# Patient Record
Sex: FEMALE | Race: WHITE | NOT HISPANIC OR LATINO | Employment: OTHER | URBAN - METROPOLITAN AREA
[De-identification: names, ages, dates, MRNs, and addresses within clinical notes are randomized per-mention and may not be internally consistent; named-entity substitution may affect disease eponyms.]

---

## 2017-01-22 DIAGNOSIS — I48.19 ATRIAL FIBRILLATION, PERSISTENT: ICD-10-CM

## 2017-01-23 RX ORDER — METOPROLOL SUCCINATE 25 MG/1
TABLET, EXTENDED RELEASE ORAL
Qty: 30 TABLET | Refills: 0 | Status: SHIPPED | OUTPATIENT
Start: 2017-01-23 | End: 2017-02-22 | Stop reason: SDUPTHER

## 2017-02-22 DIAGNOSIS — I48.19 ATRIAL FIBRILLATION, PERSISTENT: ICD-10-CM

## 2017-02-22 RX ORDER — METOPROLOL SUCCINATE 25 MG/1
TABLET, EXTENDED RELEASE ORAL
Qty: 30 TABLET | Refills: 0 | Status: SHIPPED | OUTPATIENT
Start: 2017-02-22 | End: 2017-03-24 | Stop reason: SDUPTHER

## 2017-03-16 ENCOUNTER — OFFICE VISIT (OUTPATIENT)
Dept: INTERNAL MEDICINE | Facility: CLINIC | Age: 82
End: 2017-03-16
Payer: MEDICARE

## 2017-03-16 ENCOUNTER — OFFICE VISIT (OUTPATIENT)
Dept: OPHTHALMOLOGY | Facility: CLINIC | Age: 82
End: 2017-03-16
Payer: MEDICARE

## 2017-03-16 VITALS
SYSTOLIC BLOOD PRESSURE: 118 MMHG | DIASTOLIC BLOOD PRESSURE: 74 MMHG | HEART RATE: 79 BPM | OXYGEN SATURATION: 97 % | BODY MASS INDEX: 19.84 KG/M2 | WEIGHT: 130.94 LBS | HEIGHT: 68 IN

## 2017-03-16 DIAGNOSIS — Z00.00 ENCOUNTER FOR PREVENTIVE HEALTH EXAMINATION: Primary | ICD-10-CM

## 2017-03-16 DIAGNOSIS — Z96.1 PSEUDOPHAKIA OF BOTH EYES: ICD-10-CM

## 2017-03-16 DIAGNOSIS — I27.9 PULMONARY HEART DISEASE: ICD-10-CM

## 2017-03-16 DIAGNOSIS — H35.3190 NONEXUDATIVE SENILE MACULAR DEGENERATION OF RETINA: ICD-10-CM

## 2017-03-16 DIAGNOSIS — M85.80 OSTEOPENIA: ICD-10-CM

## 2017-03-16 DIAGNOSIS — I48.19 ATRIAL FIBRILLATION, PERSISTENT: ICD-10-CM

## 2017-03-16 DIAGNOSIS — I70.0 ATHEROSCLEROSIS OF AORTA: ICD-10-CM

## 2017-03-16 DIAGNOSIS — H40.1192 MODERATE STAGE CHRONIC OPEN ANGLE GLAUCOMA: ICD-10-CM

## 2017-03-16 DIAGNOSIS — H40.1132 PRIMARY OPEN ANGLE GLAUCOMA OF BOTH EYES, MODERATE STAGE: Primary | ICD-10-CM

## 2017-03-16 PROCEDURE — 99999 PR PBB SHADOW E&M-EST. PATIENT-LVL IV: CPT | Mod: PBBFAC,,, | Performed by: PHYSICIAN ASSISTANT

## 2017-03-16 PROCEDURE — 99499 UNLISTED E&M SERVICE: CPT | Mod: S$GLB,,, | Performed by: PHYSICIAN ASSISTANT

## 2017-03-16 PROCEDURE — 99499 UNLISTED E&M SERVICE: CPT | Mod: S$GLB,,, | Performed by: OPHTHALMOLOGY

## 2017-03-16 PROCEDURE — 92012 INTRM OPH EXAM EST PATIENT: CPT | Mod: S$GLB,,, | Performed by: OPHTHALMOLOGY

## 2017-03-16 PROCEDURE — 92133 CPTRZD OPH DX IMG PST SGM ON: CPT | Mod: S$GLB,,, | Performed by: OPHTHALMOLOGY

## 2017-03-16 PROCEDURE — 99999 PR PBB SHADOW E&M-EST. PATIENT-LVL II: CPT | Mod: PBBFAC,,, | Performed by: OPHTHALMOLOGY

## 2017-03-16 PROCEDURE — G0439 PPPS, SUBSEQ VISIT: HCPCS | Mod: S$GLB,,, | Performed by: PHYSICIAN ASSISTANT

## 2017-03-16 NOTE — PROGRESS NOTES
"Destini Augustine presented for a  Medicare AWV and comprehensive Health Risk Assessment today. The following components were reviewed and updated:    · Medical history  · Family History  · Social history  · Allergies and Current Medications  · Health Risk Assessment  · Health Maintenance  · Care Team     ** See Completed Assessments for Annual Wellness Visit within the encounter summary.**       The following assessments were completed:  · Living Situation  · CAGE  · Depression Screening  · Timed Get Up and Go  · Whisper Test  · Cognitive Function Screening  · Nutrition Screening  · ADL Screening  · PAQ Screening    Vitals:    03/16/17 1405   BP: 118/74   BP Location: Left arm   Patient Position: Sitting   BP Method: Manual   Pulse: 79   SpO2: 97%   Weight: 59.4 kg (130 lb 15.3 oz)   Height: 5' 7.5" (1.715 m)     Body mass index is 20.21 kg/(m^2).  Physical Exam   Constitutional: She appears well-developed and well-nourished. No distress.   HENT:   Head: Normocephalic and atraumatic.   Eyes: Conjunctivae and EOM are normal. Right eye exhibits no discharge. Left eye exhibits no discharge.   Neck: Normal range of motion. Neck supple. No tracheal deviation present. No thyromegaly present.   Cardiovascular: Normal rate, regular rhythm and normal heart sounds.    No murmur heard.  Pulses:       Radial pulses are 2+ on the right side, and 2+ on the left side.   Pulmonary/Chest: Effort normal and breath sounds normal. No respiratory distress. She has no wheezes.   Abdominal: Soft. Bowel sounds are normal. She exhibits no distension. There is no tenderness. There is no rebound and no guarding.   Musculoskeletal: Normal range of motion. She exhibits no edema or tenderness.   Neurological: No cranial nerve deficit.   Grasp equal both hands, No tremors, or muscle fasciculations noted. Toes downgoing, Sensation intact to soft touch. Gait: No ataxia.    Skin: Skin is warm and dry. No rash noted. She is not diaphoretic. No erythema. " No pallor.   Psychiatric: She has a normal mood and affect. Her behavior is normal. Judgment and thought content normal.   Nursing note and vitals reviewed.        Diagnoses and health risks identified today and associated recommendations/orders:    1. Encounter for preventive health examination  Completed today    2. Atrial fibrillation, persistent  Stable. On Apixaban. Continue current treatment plan as previously prescribed with your cardiologist, Dr. Gibson. appt  5/1/17    3. Osteopenia  Dexa 7/17/14. Discuss update Dexa with PCP  Stable. Continue current treatment plan as previously prescribed with your PCP    4. Atherosclerosis of aorta  Lumbar x-ray 9/21/06- atherosclerotic arterial calcification noted.   Discussed this is not emergent. Discussed need to continue control BP, lipids, glucose, avoid smoking to avoid further arterial wall buildup.    5. Pulmonary heart disease  2D echo 8/22/16- The estimated PA systolic pressure is 46 mmHg.  Stable. Continue current treatment plan as previously prescribed with cardiology.    6. Moderate stage chronic open angle glaucoma  Stable. Continue current treatment plan as previously prescribed with your ophthalmologist, Dr. Benz. Appt today    7. Nonexudative senile macular degeneration of retina  Stable. Continue current treatment plan as previously prescribed with your ophthalmologist, Dr. Benz      Provided Destini with a 5-10 year written screening schedule and personal prevention plan. Recommendations were developed using the USPSTF age appropriate recommendations. Education, counseling, and referrals were provided as needed. After Visit Summary printed and given to patient which includes a list of additional screenings\tests needed. Continue to follow with your PCP as scheduled or sooner if necessary.        Víctor Bryant PA-C

## 2017-03-16 NOTE — PROGRESS NOTES
SUBJECTIVE:   Destini Augustine is a 83 y.o. female   Uncorrected distance visual acuity was 20/30 +2 in the right eye and 20/30 +2 in the left eye.   Chief Complaint   Patient presents with    Glaucoma        HPI:  HPI     Patient is here for 4 month iop check, and goct, patient is 100%   COMPLIANT WITH DROP USAGE.        1. Mod COAG OD>OS (Initial 30/26) goal = 17  2. PCIOL OD w/ ECP & Toric IOL 5/20/09  PCIOL OS w/ ECP & Toric IOL 4/22/09  3. AMD  4. PCO OS    Latanoprost QHS OU  Ocuvite  O3FO       Last edited by CURRY Fay on 3/16/2017  2:57 PM.     Assessment /Plan :  1. Primary open angle glaucoma of both eyes, moderate stage Doing well, IOP within acceptable range relative to target IOP and no evidence of progression. Continue current treatment. Reviewed importance of continued compliance with treatment and follow up.     2. Pseudophakia of both eyes doing well       Return to clinic in 4 months  or as needed.  With IOP Check.

## 2017-03-16 NOTE — MR AVS SNAPSHOT
[facility] - Internal Medicine  [address] [place] [address]  Phone: [phone]  Fax: [phone]                  Destini Augustine   3/16/2017 1:00 PM   Office Visit    Description:  Female : 3/29/1933   Provider:  Víctor Bryant PA-C   Department:  [facility] - Internal Medicine           Reason for Visit     Health Risk Assessment           Diagnoses this Visit        Comments    Encounter for preventive health examination    -  Primary     Atrial fibrillation, persistent         Osteopenia         Atherosclerosis of aorta         Pulmonary heart disease         Moderate stage chronic open angle glaucoma         Nonexudative senile macular degeneration of retina                To Do List           Future Appointments        Provider Department Dept Phone    3/16/2017 2:45 PM Quinton Benz MD [facility] Ophthalmology [phone]    2017 9:00 AM Chacho Gibson MD [facility] Cardiology [phone]      Goals (5 Years of Data)     None      [facility] On Call     [facility] On Call Nurse Care Line -  Assistance  Registered nurses in the [facility] On Call Center provide clinical advisement, health education, appointment booking, and other advisory services.  Call for this free service at [phone].             Medications           Message regarding Medications     Verify the changes and/or additions to your medication regime listed below are the same as discussed with your clinician today.  If any of these changes or additions are incorrect, please notify your healthcare provider.        STOP taking these medications     omeprazole (PRILOSEC) 20 MG capsule Take 1 capsule (20 mg total) by mouth daily as needed.           Verify that the below list of medications is an accurate representation of the medications you are currently taking.  If none reported, the list may be blank. If incorrect, please contact your healthcare provider. Carry this list with you in case of emergency.           Current  "Medications     apixaban 2.5 mg Tab Take 1 tablet (2.5 mg total) by mouth 2 (two) times daily.    BIOTIN ORAL Take 1 tablet by mouth 2 (two) times daily.     CALCIUM CARBONATE/VITAMIN D3 (CALCIUM 600 + D,3, ORAL) Take 1 capsule by mouth 2 (two) times daily.    CYANOCOBALAMIN, VITAMIN B-12, (VITAMIN B-12 ORAL) Take 1 capsule by mouth once daily.     DOCOSAHEXANOIC ACID/EPA (FISH OIL ORAL) Take 1 capsule by mouth 2 (two) times daily.     GLUCOSAMINE HCL/CHONDRO COBB A (GLUCOSAMINE-CHONDROITIN ORAL) Take 1 tablet by mouth 2 (two) times daily.     latanoprost 0.005 % ophthalmic solution Place 1 drop into both eyes nightly.    metoprolol succinate (TOPROL-XL) 25 MG 24 hr tablet TAKE ONE TABLET BY MOUTH ONCE DAILY    PV W-O ELISEO/FERROUS FUMARATE/FA (M-VIT ORAL) Take 1 tablet by mouth Daily.    turmeric root extract 500 mg Cap Take by mouth.    vitamin A-vitamin C-vit E-min (OCUVITE) Tab Take 1 tablet by mouth Daily.           Clinical Reference Information           Your Vitals Were     BP Pulse Height Weight SpO2 BMI    118/74 (BP Location: Left arm, Patient Position: Sitting, BP Method: Manual) 79 5' 7.5" (1.715 m) 59.4 kg (130 lb 15.3 oz) 97% 20.21 kg/m2      Blood Pressure          Most Recent Value    BP  118/74      Allergies as of 3/16/2017     Shellfish Containing Products    Venom-wasp      Immunizations Administered on Date of Encounter - 3/16/2017     None      Instructions      Counseling and Referral of Other Preventative  (Italic type indicates deductible and co-insurance are waived)    Patient Name: Destini Augustine  Today's Date: 3/16/2017      SERVICE LIMITATIONS RECOMMENDATION    Vaccines    · Pneumococcal (once after 65)    · Influenza (annually)    · Hepatitis B (if medium/high risk)    · Prevnar 13      Hepatitis B medium/high risk factors:       - End-stage renal disease       - Hemophiliacs who received Factor VII or         IX concentrates       - Clients of institutions for the mentally             " retarded       - Persons who live in the same house as          a HepB carrier       - Homosexual men       - Illicit injectable drug abusers     Pneumococcal: done 1999     Influenza:10/2016 Done, repeat in one year     Hepatitis B: N/A     Prevnar 13: done 8/2015    Mammogram (biennial age 50-74)  Annually (age 40 or over)  Last done 11/25/2016, recommend to repeat every 1  years    Pap (up to age 70 and after 70 if unknown history or abnormal study last 10 years)    N/A     The USPSTF recommends against screening for cervical cancer in women older than age 65 years who have had adequate prior screening and are not otherwise at high risk for cervical cancer.      Colorectal cancer screening (to age 75)    · Fecal occult blood test (annual)  · Flexible sigmoidoscopy (5y)  · Screening colonoscopy (10y)  · Barium enema   N/A    Diabetes self-management training (no USPSTF recommendations)  Requires referral by treating physician for patient with diabetes or renal disease. 10 hours of initial DSMT sessions of no less than 30 minutes each in a continuous 12-month period. 2 hours of follow-up DSMT in subsequent years.  follow PCP    Bone mass measurements (age 65 & older, biennial)  Requires diagnosis related to osteoporosis or estrogen deficiency. Biennial benefit unless patient has history of long-term glucocorticoid  Last done 7/17/14, recommend to repeat every 2  years    Glaucoma screening (no USPSTF recommendation)  Diabetes mellitus, family history   , age 50 or over    American, age 65 or over  Continue follow Dr. Benz    Medical nutrition therapy for diabetes or renal disease (no recommended schedule)  Requires referral by treating physician for patient with diabetes or renal disease or kidney transplant within the past 3 years.  Can be provided in same year as diabetes self-management training (DSMT), and CMS recommends medical nutrition therapy take place after DSMT. Up to 3 hours  for initial year and 2 hours in subsequent years.  Continue follow PCP    Cardiovascular screening blood tests (every 5 years)  · Fasting lipid panel  Order as a panel if possible  Continue follow PCP    Diabetes screening tests (at least every 3 years, Medicare covers annually or at 6-month intervals for prediabetic patients)  · Fasting blood sugar (FBS) or glucose tolerance test (GTT)  Patient must be diagnosed with one of the following:       - Hypertension       - Dyslipidemia       - Obesity (BMI 30kg/m2)       - Previous elevated impaired FBS or GTT       ... or any two of the following:       - Overweight (BMI 25 but <30)       - Family history of diabetes       - Age 65 or older       - History of gestational diabetes or birth of baby weighing more than 9 pounds  Continue follow PCP    Abdominal aortic aneurysm screening (once)  · Sonogram   Limited to patients who meet one of the following criteria:       - Men who are 65-75 years old and have smoked more than 100 cigarette in their lifetime       - Anyone with a family history of abdominal aortic aneurysm       - Anyone recommended for screening by the USPSTF  Continue follow PCP    HIV screening (annually for increased risk patients)  · HIV-1 and HIV-2 by EIA, or MISTY, rapid antibody test or oral mucosa transudate  Patients must be at increased risk for HIV infection per USPSTF guidelines or pregnant. Tests covered annually for patient at increased risk or as requested by the patient. Pregnant patients may receive up to 3 tests during pregnancy.  Risks discussed, screening is not recommended    Smoking cessation counseling (up to 8 sessions per year)  Patients must be asymptomatic of tobacco-related conditions to receive as a preventative service.  does not smoke    Subsequent annual wellness visit  At least 12 months since last AWV  Return in one year     The following information is provided to all patients.  This information is to help you find  resources for any of the problems found today that may be affecting your health:                Living healthy guide: www.[facility].[place].[facility]      Understanding Diabetes: www.diabetes.org      Eating healthy: www.cdc.gov/healthyweight      CDC home safety checklist: www.cdc.gov/steadi/patient.html      Agency on Aging: www.goea.[place].[facility]      Alcoholics anonymous (AA): www.aa.org      Physical Activity: www.deidra.nih.gov/pu6ymqj      Tobacco use: www.quitwithusla.org          Language Assistance Services     ATTENTION: Language assistance services are available, free of charge. Please call [phone].      ATENCIÓN: Si habla español, tiene a portillo disposición servicios gratuitos de asistencia lingüística. Llame al [phone].     CHÚ Ý: N?u b?n nói Ti?ng Vi?t, có các d?ch v? h? tr? ngôn ng? mi?n phí dành cho b?n. G?i s? [phone].         [facility] - Internal Medicine complies with applicable Federal civil rights laws and does not discriminate on the basis of race, color, national origin, age, disability, or sex.

## 2017-03-16 NOTE — PATIENT INSTRUCTIONS
Counseling and Referral of Other Preventative  (Italic type indicates deductible and co-insurance are waived)    Patient Name: Destini Augustine  Today's Date: 3/16/2017      SERVICE LIMITATIONS RECOMMENDATION    Vaccines    · Pneumococcal (once after 65)    · Influenza (annually)    · Hepatitis B (if medium/high risk)    · Prevnar 13      Hepatitis B medium/high risk factors:       - End-stage renal disease       - Hemophiliacs who received Factor VII or         IX concentrates       - Clients of institutions for the mentally             retarded       - Persons who live in the same house as          a HepB carrier       - Homosexual men       - Illicit injectable drug abusers     Pneumococcal: done 1999     Influenza:10/2016 Done, repeat in one year     Hepatitis B: N/A     Prevnar 13: done 8/2015    Mammogram (biennial age 50-74)  Annually (age 40 or over)  Last done 11/25/2016, recommend to repeat every 1  years    Pap (up to age 70 and after 70 if unknown history or abnormal study last 10 years)    N/A     The USPSTF recommends against screening for cervical cancer in women older than age 65 years who have had adequate prior screening and are not otherwise at high risk for cervical cancer.      Colorectal cancer screening (to age 75)    · Fecal occult blood test (annual)  · Flexible sigmoidoscopy (5y)  · Screening colonoscopy (10y)  · Barium enema   N/A    Diabetes self-management training (no USPSTF recommendations)  Requires referral by treating physician for patient with diabetes or renal disease. 10 hours of initial DSMT sessions of no less than 30 minutes each in a continuous 12-month period. 2 hours of follow-up DSMT in subsequent years.  follow PCP    Bone mass measurements (age 65 & older, biennial)  Requires diagnosis related to osteoporosis or estrogen deficiency. Biennial benefit unless patient has history of long-term glucocorticoid  Last done 7/17/14, recommend to repeat every 2  years    Glaucoma  screening (no USPSTF recommendation)  Diabetes mellitus, family history   , age 50 or over    American, age 65 or over  Continue follow Dr. Benz    Medical nutrition therapy for diabetes or renal disease (no recommended schedule)  Requires referral by treating physician for patient with diabetes or renal disease or kidney transplant within the past 3 years.  Can be provided in same year as diabetes self-management training (DSMT), and CMS recommends medical nutrition therapy take place after DSMT. Up to 3 hours for initial year and 2 hours in subsequent years.  Continue follow PCP    Cardiovascular screening blood tests (every 5 years)  · Fasting lipid panel  Order as a panel if possible  Continue follow PCP    Diabetes screening tests (at least every 3 years, Medicare covers annually or at 6-month intervals for prediabetic patients)  · Fasting blood sugar (FBS) or glucose tolerance test (GTT)  Patient must be diagnosed with one of the following:       - Hypertension       - Dyslipidemia       - Obesity (BMI 30kg/m2)       - Previous elevated impaired FBS or GTT       ... or any two of the following:       - Overweight (BMI 25 but <30)       - Family history of diabetes       - Age 65 or older       - History of gestational diabetes or birth of baby weighing more than 9 pounds  Continue follow PCP    Abdominal aortic aneurysm screening (once)  · Sonogram   Limited to patients who meet one of the following criteria:       - Men who are 65-75 years old and have smoked more than 100 cigarette in their lifetime       - Anyone with a family history of abdominal aortic aneurysm       - Anyone recommended for screening by the USPSTF  Continue follow PCP    HIV screening (annually for increased risk patients)  · HIV-1 and HIV-2 by EIA, or MISTY, rapid antibody test or oral mucosa transudate  Patients must be at increased risk for HIV infection per USPSTF guidelines or pregnant. Tests covered  annually for patient at increased risk or as requested by the patient. Pregnant patients may receive up to 3 tests during pregnancy.  Risks discussed, screening is not recommended    Smoking cessation counseling (up to 8 sessions per year)  Patients must be asymptomatic of tobacco-related conditions to receive as a preventative service.  does not smoke    Subsequent annual wellness visit  At least 12 months since last AWV  Return in one year     The following information is provided to all patients.  This information is to help you find resources for any of the problems found today that may be affecting your health:                Living healthy guide: www.[facility].[place].[facility]      Understanding Diabetes: www.diabetes.org      Eating healthy: www.cdc.gov/healthyweight      CDC home safety checklist: www.cdc.gov/steadi/patient.html      Agency on Aging: www.goea.[place].gov      Alcoholics anonymous (AA): www.aa.org      Physical Activity: www.deidra.nih.gov/dd2aplj      Tobacco use: www.quitwithusla.org

## 2017-03-16 NOTE — MR AVS SNAPSHOT
[facility] - Ophthalmology  [address] [place] [address]  Phone: [phone]  Fax: [phone]                  Destini Augustine   3/16/2017 2:45 PM   Office Visit    Description:  Female : 3/29/1933   Provider:  Quinton Benz MD   Department:  [facility] - Ophthalmology           Reason for Visit     Glaucoma           Diagnoses this Visit        Comments    Primary open angle glaucoma of both eyes, moderate stage    -  Primary     Pseudophakia of both eyes                To Do List           Future Appointments        Provider Department Dept Phone    2017 9:00 AM Chacho Gibson MD [facility] Cardiology [phone]      Goals (5 Years of Data)     None      Follow-Up and Disposition     Return in about 4 months (around 2017).      [facility] On Call     [facility] On Call Nurse Care Line -  Assistance  Registered nurses in the [facility] On Call Center provide clinical advisement, health education, appointment booking, and other advisory services.  Call for this free service at [phone].             Medications           Message regarding Medications     Verify the changes and/or additions to your medication regime listed below are the same as discussed with your clinician today.  If any of these changes or additions are incorrect, please notify your healthcare provider.             Verify that the below list of medications is an accurate representation of the medications you are currently taking.  If none reported, the list may be blank. If incorrect, please contact your healthcare provider. Carry this list with you in case of emergency.           Current Medications     apixaban 2.5 mg Tab Take 1 tablet (2.5 mg total) by mouth 2 (two) times daily.    BIOTIN ORAL Take 1 tablet by mouth 2 (two) times daily.     CALCIUM CARBONATE/VITAMIN D3 (CALCIUM 600 + D,3, ORAL) Take 1 capsule by mouth 2 (two) times daily.    CYANOCOBALAMIN, VITAMIN B-12, (VITAMIN B-12 ORAL) Take 1 capsule by  mouth once daily.     DOCOSAHEXANOIC ACID/EPA (FISH OIL ORAL) Take 1 capsule by mouth 2 (two) times daily.     GLUCOSAMINE HCL/CHONDRO PORTILLO A (GLUCOSAMINE-CHONDROITIN ORAL) Take 1 tablet by mouth 2 (two) times daily.     latanoprost 0.005 % ophthalmic solution Place 1 drop into both eyes nightly.    metoprolol succinate (TOPROL-XL) 25 MG 24 hr tablet TAKE ONE TABLET BY MOUTH ONCE DAILY    PV W-O ELISEO/FERROUS FUMARATE/FA (M-VIT ORAL) Take 1 tablet by mouth Daily.    turmeric root extract 500 mg Cap Take by mouth.    vitamin A-vitamin C-vit E-min (OCUVITE) Tab Take 1 tablet by mouth Daily.           Clinical Reference Information           Allergies as of 3/16/2017     Shellfish Containing Products    Venom-wasp      Immunizations Administered on Date of Encounter - 3/16/2017     None      Orders Placed During Today's Visit      Normal Orders This Visit    Posterior Segment OCT Optic Nerve- Both eyes       Language Assistance Services     ATTENTION: Language assistance services are available, free of charge. Please call [phone].      ATENCIÓN: Si rosio donaldson, tiene a portillo disposición servicios gratuitos de asistencia lingüística. Llame al [phone].     [facility] Ý: N?u b?n nói Ti?ng Vi?t, có các d?ch v? h? tr? ngôn ng? mi?n phí yanelih cho b?n. G?i s? [phone].         [facility] - [facility] complies with applicable Federal civil rights laws and does not discriminate on the basis of race, color, national origin, age, disability, or sex.

## 2017-03-17 ENCOUNTER — TELEPHONE (OUTPATIENT)
Dept: CARDIOLOGY | Facility: CLINIC | Age: 82
End: 2017-03-17

## 2017-03-17 NOTE — TELEPHONE ENCOUNTER
----- Message from Nilam Galloway sent at 3/16/2017  4:29 PM CDT -----  Suzanne with [employer]//[phone]//ext [phone]//states is calling to ask if your office received the fax that was sent to you//please call/thanks/[facility]

## 2017-03-24 DIAGNOSIS — I48.19 ATRIAL FIBRILLATION, PERSISTENT: ICD-10-CM

## 2017-03-24 RX ORDER — METOPROLOL SUCCINATE 25 MG/1
TABLET, EXTENDED RELEASE ORAL
Qty: 30 TABLET | Refills: 0 | Status: SHIPPED | OUTPATIENT
Start: 2017-03-24 | End: 2017-04-24 | Stop reason: SDUPTHER

## 2017-03-30 DIAGNOSIS — I48.19 ATRIAL FIBRILLATION, PERSISTENT: ICD-10-CM

## 2017-03-30 RX ORDER — APIXABAN 2.5 MG/1
TABLET, FILM COATED ORAL
Qty: 60 TABLET | Refills: 0 | Status: SHIPPED | OUTPATIENT
Start: 2017-03-30 | End: 2017-05-01 | Stop reason: SDUPTHER

## 2017-04-03 ENCOUNTER — PATIENT MESSAGE (OUTPATIENT)
Dept: CARDIOLOGY | Facility: CLINIC | Age: 82
End: 2017-04-03

## 2017-04-06 ENCOUNTER — TELEPHONE (OUTPATIENT)
Dept: CARDIOLOGY | Facility: CLINIC | Age: 82
End: 2017-04-06

## 2017-04-06 NOTE — TELEPHONE ENCOUNTER
----- Message from Sumaya Conti sent at 4/6/2017  9:03 AM CDT -----  Call derrick with [facility] long term care at [phone]//We are  Waiting for the pt vital sign sheet please fax to [phone]//ks ht

## 2017-04-24 DIAGNOSIS — I48.19 ATRIAL FIBRILLATION, PERSISTENT: ICD-10-CM

## 2017-04-24 RX ORDER — METOPROLOL SUCCINATE 25 MG/1
TABLET, EXTENDED RELEASE ORAL
Qty: 30 TABLET | Refills: 0 | Status: SHIPPED | OUTPATIENT
Start: 2017-04-24 | End: 2017-05-01 | Stop reason: SDUPTHER

## 2017-05-01 ENCOUNTER — OFFICE VISIT (OUTPATIENT)
Dept: CARDIOLOGY | Facility: CLINIC | Age: 82
End: 2017-05-01
Payer: MEDICARE

## 2017-05-01 VITALS
HEART RATE: 72 BPM | WEIGHT: 130.06 LBS | DIASTOLIC BLOOD PRESSURE: 60 MMHG | HEIGHT: 67 IN | SYSTOLIC BLOOD PRESSURE: 114 MMHG | BODY MASS INDEX: 20.41 KG/M2

## 2017-05-01 DIAGNOSIS — I70.0 ATHEROSCLEROSIS OF AORTA: ICD-10-CM

## 2017-05-01 DIAGNOSIS — I48.19 ATRIAL FIBRILLATION, PERSISTENT: ICD-10-CM

## 2017-05-01 DIAGNOSIS — I48.21 ATRIAL FIBRILLATION, PERMANENT: Primary | Chronic | ICD-10-CM

## 2017-05-01 PROCEDURE — 1126F AMNT PAIN NOTED NONE PRSNT: CPT | Mod: S$GLB,,, | Performed by: NUCLEAR MEDICINE

## 2017-05-01 PROCEDURE — 99999 PR PBB SHADOW E&M-EST. PATIENT-LVL III: CPT | Mod: PBBFAC,,, | Performed by: NUCLEAR MEDICINE

## 2017-05-01 PROCEDURE — 1159F MED LIST DOCD IN RCRD: CPT | Mod: S$GLB,,, | Performed by: NUCLEAR MEDICINE

## 2017-05-01 PROCEDURE — 99214 OFFICE O/P EST MOD 30 MIN: CPT | Mod: S$GLB,,, | Performed by: NUCLEAR MEDICINE

## 2017-05-01 PROCEDURE — 99499 UNLISTED E&M SERVICE: CPT | Mod: S$GLB,,, | Performed by: NUCLEAR MEDICINE

## 2017-05-01 PROCEDURE — 1160F RVW MEDS BY RX/DR IN RCRD: CPT | Mod: S$GLB,,, | Performed by: NUCLEAR MEDICINE

## 2017-05-01 RX ORDER — METOPROLOL SUCCINATE 25 MG/1
25 TABLET, EXTENDED RELEASE ORAL DAILY
Qty: 30 TABLET | Refills: 5 | Status: SHIPPED | OUTPATIENT
Start: 2017-05-01 | End: 2017-11-18 | Stop reason: SDUPTHER

## 2017-05-01 NOTE — PROGRESS NOTES
Subjective:   Patient ID:  Destini Augustine is a 84 y.o. female who presents for follow-up of Atrial Fibrillation (6 month followup)      HPI 1- CHRONIC PERMANENT AF  2- ATHEROSCLEROSIS OF AORTA  DOING WELL. NO RECENT ED VISITS OR HOSPITALIZATIONS FOR EXACERBATION OF CARD ARRHYTHMIAS. OR ADHF. OR ACS  NO UNUSUAL FATIGUE OR TIREDNESS.   NO CHEST DISCOMFORT. NO ABDOMINAL OR BACK PAIN  NO EDEMA. NO CALVE TENDERNESS.   NO INTERMITTENT CLAUDICATION  NO FOCAL CNS SYMPTOMS OR SIGNS TO SUGGEST TIA OR STROKE  NO ABNORMAL BLEEDING- ON NOAC- ELIQUIS    Review of Systems   Constitution: Negative for chills, fever, weakness, night sweats, weight gain and weight loss.   HENT: Negative for headaches and nosebleeds.    Eyes: Negative for blurred vision, double vision and visual disturbance.   Cardiovascular: Negative for chest pain, dyspnea on exertion, irregular heartbeat, leg swelling, orthopnea, palpitations, paroxysmal nocturnal dyspnea and syncope.   Respiratory: Negative for cough, hemoptysis and wheezing.    Endocrine: Negative for polydipsia and polyuria.   Hematologic/Lymphatic: Does not bruise/bleed easily.   Skin: Negative for rash.   Musculoskeletal: Negative for joint pain, joint swelling, muscle weakness and myalgias.   Gastrointestinal: Negative for abdominal pain, hematemesis, jaundice and melena.   Genitourinary: Negative for dysuria, hematuria and nocturia.   Neurological: Negative for dizziness, focal weakness and sensory change.   Psychiatric/Behavioral: Negative for depression. The patient does not have insomnia and is not nervous/anxious.      Family History   Problem Relation Age of Onset    Cataracts Mother     Hypertension Mother     Stroke Mother     Cancer Father      colon    Thyroid disease Father     Cancer Brother      lung    Tuberculosis Brother     Blindness Neg Hx     Diabetes Neg Hx     Macular degeneration Neg Hx     Retinal detachment Neg Hx     Strabismus Neg Hx      Past Medical  History:   Diagnosis Date    Arthritis     knees, hands    Atrial fibrillation     Basal cell carcinoma     face, right thigh    COAG (chronic open-angle glaucoma) - Both Eyes 8/28/2013    Ex-smoker     Hamartoma     mandible    Macular degeneration     Osteopenia     7/14 shama 7/16    Scoliosis 11/30/15    lumbar x-ray     Current Outpatient Prescriptions on File Prior to Visit   Medication Sig Dispense Refill    BIOTIN ORAL Take 1 tablet by mouth 2 (two) times daily.       CALCIUM CARBONATE/VITAMIN D3 (CALCIUM 600 + D,3, ORAL) Take 1 capsule by mouth 2 (two) times daily.      CYANOCOBALAMIN, VITAMIN B-12, (VITAMIN B-12 ORAL) Take 1 capsule by mouth once daily.       DOCOSAHEXANOIC ACID/EPA (FISH OIL ORAL) Take 1 capsule by mouth 2 (two) times daily.       GLUCOSAMINE HCL/CHONDRO COBB A (GLUCOSAMINE-CHONDROITIN ORAL) Take 1 tablet by mouth 2 (two) times daily.       latanoprost 0.005 % ophthalmic solution Place 1 drop into both eyes nightly. 1 Bottle 12    PV W-O ELISEO/FERROUS FUMARATE/FA (M-VIT ORAL) Take 1 tablet by mouth Daily.      turmeric root extract 500 mg Cap Take by mouth.      vitamin A-vitamin C-vit E-min (OCUVITE) Tab Take 1 tablet by mouth Daily.      [DISCONTINUED] ELIQUIS 2.5 mg Tab TAKE ONE TABLET BY MOUTH TWICE DAILY 60 tablet 0    [DISCONTINUED] metoprolol succinate (TOPROL-XL) 25 MG 24 hr tablet TAKE ONE TABLET BY MOUTH ONCE DAILY 30 tablet 0     No current facility-administered medications on file prior to visit.      Review of patient's allergies indicates:   Allergen Reactions    Shellfish containing products      Other reaction(s): Nausea  Other reaction(s): Diarrhea    Venom-wasp Swelling     Other reaction(s): Itching       Objective:     Physical Exam   Constitutional: She is oriented to person, place, and time. She appears well-developed. No distress.   HENT:   Head: Normocephalic.   Eyes: Conjunctivae are normal. Pupils are equal, round, and reactive to light. No  scleral icterus.   Neck: Normal range of motion. Neck supple. Normal carotid pulses, no hepatojugular reflux and no JVD present. Carotid bruit is not present. No edema present. No thyroid mass and no thyromegaly present.   Cardiovascular: Normal rate, S1 normal, S2 normal, normal heart sounds and intact distal pulses.  An irregularly irregular rhythm present. PMI is not displaced.  Exam reveals no gallop and no friction rub.    No murmur heard.  Pulses:       Carotid pulses are 2+ on the right side, and 2+ on the left side.       Radial pulses are 2+ on the right side, and 2+ on the left side.        Femoral pulses are 2+ on the right side, and 2+ on the left side.       Popliteal pulses are 2+ on the right side, and 2+ on the left side.        Dorsalis pedis pulses are 2+ on the right side, and 2+ on the left side.        Posterior tibial pulses are 2+ on the right side, and 2+ on the left side.   Pulmonary/Chest: Effort normal and breath sounds normal. She has no wheezes. She has no rales. She exhibits no tenderness.   Abdominal: Soft. Bowel sounds are normal. She exhibits no pulsatile midline mass and no mass. There is no hepatosplenomegaly. There is no tenderness.   Musculoskeletal: Normal range of motion. She exhibits no edema or tenderness.        Cervical back: Normal.        Thoracic back: Normal.        Lumbar back: Normal.   Lymphadenopathy:     She has no cervical adenopathy.     She has no axillary adenopathy.        Right: No supraclavicular adenopathy present.        Left: No supraclavicular adenopathy present.   Neurological: She is alert and oriented to person, place, and time. She has normal strength and normal reflexes. No sensory deficit. Gait normal.   Skin: Skin is warm. No rash noted. No cyanosis. No pallor. Nails show no clubbing.   Psychiatric: She has a normal mood and affect. Her speech is normal and behavior is normal. Cognition and memory are normal.       Assessment:     1. Atrial  fibrillation, permanent    2. Atherosclerosis of aorta      STABLE CV STATUS- CONTROLLED ARRHYTHMIAS-  NO ACTIVE MYOCARDIAL ISCHEMIA. NO ADHF  CNS STATUS STABLE  NO ABNORMAL BLEEDING  CARD MED WELL TOLERATED  Plan:     Atrial fibrillation, permanent    Atherosclerosis of aorta    1- CONTINUE PRESENT CARD MANAGEMENT    2- RETURN IN 6 MONTHS.

## 2017-05-01 NOTE — MR AVS SNAPSHOT
[facility] - Cardiology  [address] [facility] [place] [address]  Phone: [phone]  Fax: [phone]                  Destini LUNA Augustine   2017 9:00 AM   Office Visit    Description:  Female : 3/29/1933   Provider:  Chacho Gibson MD   Department:  [facility]           Reason for Visit     Atrial Fibrillation           Diagnoses this Visit        Comments    Atrial fibrillation, permanent    -  Primary     Atherosclerosis of aorta                To Do List           Future Appointments        Provider Department Dept Phone    2017 1:45 PM Quinton Benz MD [facility] Ophthalmology [phone]      Goals (5 Years of Data)     None      Follow-Up and Disposition     Return in about 6 months (around 2017).      [facility]     [facility] On Call Nurse Care Line -  Assistance  Unless otherwise directed by your provider, please contact [facility], our nurse care line that is available for  assistance.     Registered nurses in the [facility] On Call Center provide: appointment scheduling, clinical advisement, health education, and other advisory services.  Call: [phone] (toll free)               Medications           Message regarding Medications     Verify the changes and/or additions to your medication regime listed below are the same as discussed with your clinician today.  If any of these changes or additions are incorrect, please notify your healthcare provider.             Verify that the below list of medications is an accurate representation of the medications you are currently taking.  If none reported, the list may be blank. If incorrect, please contact your healthcare provider. Carry this list with you in case of emergency.           Current Medications     BIOTIN ORAL Take 1 tablet by mouth 2 (two) times daily.     CALCIUM CARBONATE/VITAMIN D3 (CALCIUM 600 + D,3, ORAL) Take 1 capsule by mouth 2 (two) times daily.    CYANOCOBALAMIN, VITAMIN B-12, (VITAMIN  "B-12 ORAL) Take 1 capsule by mouth once daily.     DOCOSAHEXANOIC ACID/EPA (FISH OIL ORAL) Take 1 capsule by mouth 2 (two) times daily.     GLUCOSAMINE HCL/CHONDRO PORTILLO A (GLUCOSAMINE-CHONDROITIN ORAL) Take 1 tablet by mouth 2 (two) times daily.     latanoprost 0.005 % ophthalmic solution Place 1 drop into both eyes nightly.    PV W-O ELISEO/FERROUS FUMARATE/FA (M-VIT ORAL) Take 1 tablet by mouth Daily.    turmeric root extract 500 mg Cap Take by mouth.    vitamin A-vitamin C-vit E-min (OCUVITE) Tab Take 1 tablet by mouth Daily.    apixaban (ELIQUIS) 2.5 mg Tab Take 1 tablet (2.5 mg total) by mouth 2 (two) times daily.    metoprolol succinate (TOPROL-XL) 25 MG 24 hr tablet Take 1 tablet (25 mg total) by mouth once daily.           Clinical Reference Information           Your Vitals Were     BP Pulse Height Weight BMI    114/60 (BP Location: Right arm, Patient Position: Sitting, BP Method: Manual) 72 5' 7" (1.702 m) 59 kg (130 lb 1.1 oz) 20.37 kg/m2      Blood Pressure          Most Recent Value    BP  114/60      Allergies as of 5/1/2017     Shellfish Containing Products    Venom-wasp      Immunizations Administered on Date of Encounter - 5/1/2017     None      Language Assistance Services     ATTENTION: Language assistance services are available, free of charge. Please call [phone].      ATENCIÓN: Si habla fernieañol, tiene a portillo disposición servicios gratuitos de asistencia lingüística. Llame al [phone].     CHÚ Ý: N?u b?n nói Ti?ng Vi?t, có các d?ch v? h? tr? ngôn ng? mi?n phí dành cho b?n. G?i s? [phone].         [facility] complies with applicable Federal civil rights laws and does not discriminate on the basis of race, color, national origin, age, disability, or sex.        "

## 2017-07-20 ENCOUNTER — OFFICE VISIT (OUTPATIENT)
Dept: OPHTHALMOLOGY | Facility: CLINIC | Age: 82
End: 2017-07-20
Payer: MEDICARE

## 2017-07-20 DIAGNOSIS — Z96.1 PSEUDOPHAKIA OF BOTH EYES: ICD-10-CM

## 2017-07-20 DIAGNOSIS — H40.1132 PRIMARY OPEN ANGLE GLAUCOMA OF BOTH EYES, MODERATE STAGE: Primary | ICD-10-CM

## 2017-07-20 PROCEDURE — 99999 PR PBB SHADOW E&M-EST. PATIENT-LVL II: CPT | Mod: PBBFAC,,, | Performed by: OPHTHALMOLOGY

## 2017-07-20 PROCEDURE — 92012 INTRM OPH EXAM EST PATIENT: CPT | Mod: S$GLB,,, | Performed by: OPHTHALMOLOGY

## 2017-07-20 RX ORDER — LATANOPROST 50 UG/ML
1 SOLUTION/ DROPS OPHTHALMIC NIGHTLY
Qty: 1 BOTTLE | Refills: 12 | Status: SHIPPED | OUTPATIENT
Start: 2017-07-20 | End: 2018-01-04 | Stop reason: SDUPTHER

## 2017-07-20 NOTE — PROGRESS NOTES
SUBJECTIVE:   Destini Augustine is a 84 y.o. female   Uncorrected distance visual acuity was 20/25 in the right eye and 20/25 in the left eye.   Chief Complaint   Patient presents with    Glaucoma        HPI:  HPI     Patient returns for a 4 month iop check, patient states she is 100%   compliant with drop usage.        1. Mod COAG OD>OS (Initial 30/26) goal = 17  2. PCIOL OD w/ ECP & Toric IOL 5/20/09  PCIOL OS w/ ECP & Toric IOL 4/22/09  3. AMD  4. PCO OS    OU  Latanoprost QHS   Ocuvite  O3FO        1. Mod COAG OD>OS (Initial 30/26) goal = 17  2. PCIOL OD w/ ECP & Toric IOL 5/20/09  PCIOL OS w/ ECP & Toric IOL 4/22/09  3. AMD  4. PCO OS    OU  Latanoprost QHS   Ocuvite  O3FO    Last edited by CURRY Fay on 7/20/2017  1:47 PM. (History)        Assessment /Plan :  1. Primary open angle glaucoma of both eyes, moderate stage Doing well, IOP within acceptable range relative to target IOP and no evidence of progression. Continue current treatment. Reviewed importance of continued compliance with treatment and follow up.     2. Pseudophakia of both eyes stable       Return to clinic in 3-4 months  or as needed.  With 24-2 HVF, Dilation and SDP's

## 2017-10-05 ENCOUNTER — OFFICE VISIT (OUTPATIENT)
Dept: INTERNAL MEDICINE | Facility: CLINIC | Age: 82
End: 2017-10-05
Payer: MEDICARE

## 2017-10-05 ENCOUNTER — LAB VISIT (OUTPATIENT)
Dept: LAB | Facility: HOSPITAL | Age: 82
End: 2017-10-05
Attending: NURSE PRACTITIONER
Payer: MEDICARE

## 2017-10-05 ENCOUNTER — TELEPHONE (OUTPATIENT)
Dept: INTERNAL MEDICINE | Facility: CLINIC | Age: 82
End: 2017-10-05

## 2017-10-05 VITALS
OXYGEN SATURATION: 95 % | WEIGHT: 128.88 LBS | HEIGHT: 67 IN | TEMPERATURE: 97 F | BODY MASS INDEX: 20.23 KG/M2 | SYSTOLIC BLOOD PRESSURE: 138 MMHG | HEART RATE: 79 BPM | DIASTOLIC BLOOD PRESSURE: 88 MMHG

## 2017-10-05 DIAGNOSIS — Z78.0 POST-MENOPAUSAL: ICD-10-CM

## 2017-10-05 DIAGNOSIS — Z12.39 BREAST CANCER SCREENING: ICD-10-CM

## 2017-10-05 DIAGNOSIS — I70.0 ATHEROSCLEROSIS OF AORTA: ICD-10-CM

## 2017-10-05 DIAGNOSIS — Z00.00 PHYSICAL EXAM, ROUTINE: Primary | ICD-10-CM

## 2017-10-05 DIAGNOSIS — Z00.00 PHYSICAL EXAM, ROUTINE: ICD-10-CM

## 2017-10-05 DIAGNOSIS — M85.80 OSTEOPENIA WITH HIGH RISK OF FRACTURE: ICD-10-CM

## 2017-10-05 DIAGNOSIS — I48.21 ATRIAL FIBRILLATION, PERMANENT: Chronic | ICD-10-CM

## 2017-10-05 DIAGNOSIS — R23.8 SKIN IRRITATION: ICD-10-CM

## 2017-10-05 DIAGNOSIS — I27.9 PULMONARY HEART DISEASE: ICD-10-CM

## 2017-10-05 DIAGNOSIS — R92.8 ABNORMAL MAMMOGRAM: ICD-10-CM

## 2017-10-05 DIAGNOSIS — M85.80 OSTEOPENIA, UNSPECIFIED LOCATION: ICD-10-CM

## 2017-10-05 LAB
ALBUMIN SERPL BCP-MCNC: 3.7 G/DL
ALP SERPL-CCNC: 89 U/L
ALT SERPL W/O P-5'-P-CCNC: 59 U/L
ANION GAP SERPL CALC-SCNC: 6 MMOL/L
AST SERPL-CCNC: 39 U/L
BASOPHILS # BLD AUTO: 0.02 K/UL
BASOPHILS NFR BLD: 0.5 %
BILIRUB SERPL-MCNC: 0.8 MG/DL
BUN SERPL-MCNC: 16 MG/DL
CALCIUM SERPL-MCNC: 10.7 MG/DL
CHLORIDE SERPL-SCNC: 100 MMOL/L
CHOLEST SERPL-MCNC: 149 MG/DL
CHOLEST/HDLC SERPL: 2.2 {RATIO}
CO2 SERPL-SCNC: 32 MMOL/L
CREAT SERPL-MCNC: 0.9 MG/DL
DIFFERENTIAL METHOD: ABNORMAL
EOSINOPHIL # BLD AUTO: 0.2 K/UL
EOSINOPHIL NFR BLD: 4.4 %
ERYTHROCYTE [DISTWIDTH] IN BLOOD BY AUTOMATED COUNT: 14.4 %
EST. GFR  (AFRICAN AMERICAN): >60 ML/MIN/1.73 M^2
EST. GFR  (NON AFRICAN AMERICAN): 58.9 ML/MIN/1.73 M^2
GLUCOSE SERPL-MCNC: 93 MG/DL
HCT VFR BLD AUTO: 44.7 %
HDLC SERPL-MCNC: 68 MG/DL
HDLC SERPL: 45.6 %
HGB BLD-MCNC: 14.7 G/DL
LDLC SERPL CALC-MCNC: 68.4 MG/DL
LYMPHOCYTES # BLD AUTO: 1.3 K/UL
LYMPHOCYTES NFR BLD: 31.8 %
MCH RBC QN AUTO: 29.5 PG
MCHC RBC AUTO-ENTMCNC: 32.9 G/DL
MCV RBC AUTO: 90 FL
MONOCYTES # BLD AUTO: 0.2 K/UL
MONOCYTES NFR BLD: 5.7 %
NEUTROPHILS # BLD AUTO: 2.3 K/UL
NEUTROPHILS NFR BLD: 57.4 %
NONHDLC SERPL-MCNC: 81 MG/DL
PLATELET # BLD AUTO: 159 K/UL
PMV BLD AUTO: 12 FL
POTASSIUM SERPL-SCNC: 4.5 MMOL/L
PROT SERPL-MCNC: 7.2 G/DL
RBC # BLD AUTO: 4.99 M/UL
SODIUM SERPL-SCNC: 138 MMOL/L
TRIGL SERPL-MCNC: 63 MG/DL
TSH SERPL DL<=0.005 MIU/L-ACNC: 1.05 UIU/ML
WBC # BLD AUTO: 4.06 K/UL

## 2017-10-05 PROCEDURE — 99397 PER PM REEVAL EST PAT 65+ YR: CPT | Mod: S$GLB,,, | Performed by: NURSE PRACTITIONER

## 2017-10-05 PROCEDURE — 84443 ASSAY THYROID STIM HORMONE: CPT

## 2017-10-05 PROCEDURE — 85025 COMPLETE CBC W/AUTO DIFF WBC: CPT

## 2017-10-05 PROCEDURE — 80053 COMPREHEN METABOLIC PANEL: CPT

## 2017-10-05 PROCEDURE — 80061 LIPID PANEL: CPT

## 2017-10-05 PROCEDURE — 99999 PR PBB SHADOW E&M-EST. PATIENT-LVL V: CPT | Mod: PBBFAC,,, | Performed by: NURSE PRACTITIONER

## 2017-10-05 PROCEDURE — 36415 COLL VENOUS BLD VENIPUNCTURE: CPT | Mod: PO

## 2017-10-05 PROCEDURE — 99499 UNLISTED E&M SERVICE: CPT | Mod: S$GLB,,, | Performed by: NURSE PRACTITIONER

## 2017-10-05 RX ORDER — MUPIROCIN 20 MG/G
OINTMENT TOPICAL 2 TIMES DAILY
Qty: 1 TUBE | Refills: 0 | Status: SHIPPED | OUTPATIENT
Start: 2017-10-05 | End: 2017-10-19

## 2017-10-05 NOTE — TELEPHONE ENCOUNTER
I called and informed the pt of dexa scan scheduling and she verbalized understanding of date and time.

## 2017-10-05 NOTE — TELEPHONE ENCOUNTER
----- Message from Natali Lee sent at 10/5/2017 10:43 AM CDT -----  Contact: [facility]  bactroban direction clarification needed...[phone]

## 2017-10-05 NOTE — PROGRESS NOTES
Subjective:       Patient ID: Destini Augustine is a 84 y.o. female.    Chief Complaint: Annual Exam    Patient presents for annual physical.  PCP: Dr. Love.  Requesting order for mammogram and needs a Flu shot.  Has some redden circular area to left forearm that appeared a few days ago after working in the yard.  Describes as itching.       Review of Systems   Constitutional: Negative for activity change and unexpected weight change.   HENT: Positive for rhinorrhea. Negative for hearing loss and trouble swallowing.    Eyes: Negative for discharge and visual disturbance.   Respiratory: Negative for chest tightness and wheezing.    Cardiovascular: Positive for palpitations (Has a-fib). Negative for chest pain.   Gastrointestinal: Negative for blood in stool, constipation, diarrhea and vomiting.   Endocrine: Negative for polydipsia and polyuria.   Genitourinary: Negative for difficulty urinating, dysuria, hematuria and menstrual problem.   Musculoskeletal: Positive for arthralgias. Negative for joint swelling and neck pain.   Neurological: Negative for weakness and headaches.   Psychiatric/Behavioral: Negative for confusion and dysphoric mood.       Objective:      Physical Exam   Constitutional: She is oriented to person, place, and time. Vital signs are normal. She appears well-developed and well-nourished.   HENT:   Head: Normocephalic and atraumatic.   Right Ear: Hearing, tympanic membrane and external ear normal.   Left Ear: Hearing, tympanic membrane and external ear normal.   Nose: Rhinorrhea present.   Mouth/Throat: Uvula is midline and oropharynx is clear and moist.   Cerumen noted to bilateral ear canals.    Neck: Normal range of motion.   Cardiovascular: Normal rate.    Pulmonary/Chest: Effort normal and breath sounds normal.   Abdominal: Soft. Bowel sounds are normal. She exhibits no distension. There is no tenderness.   Musculoskeletal: Normal range of motion.   Neurological: She is alert and oriented to  person, place, and time.   Skin: Skin is warm. There is erythema (left forearm ).        Psychiatric: She has a normal mood and affect. Her behavior is normal.       Assessment:       1. Physical exam, routine    2. Atrial fibrillation, permanent    3. Breast cancer screening    4. Abnormal mammogram    5. Osteopenia, unspecified location    6. Pulmonary heart disease    7. Atherosclerosis of aorta    8. Skin irritation        Plan:         Physical exam, routine  -     CBC auto differential; Future; Expected date: 10/05/2017  -     Comprehensive metabolic panel; Future; Expected date: 10/05/2017  -     TSH; Future; Expected date: 10/05/2017  -     Lipid panel; Future; Expected date: 10/05/2017  -     Urinalysis; Future; Expected date: 10/05/2017    Atrial fibrillation, permanent  -     CBC auto differential; Future; Expected date: 10/05/2017  -     Comprehensive metabolic panel; Future; Expected date: 10/05/2017  -     TSH; Future; Expected date: 10/05/2017  -     Lipid panel; Future; Expected date: 10/05/2017    Breast cancer screening  -     Cancel: Mammo Breast Specimen; Future; Expected date: 10/05/2017  -     Mammo Digital Screening Bilateral With CAD; Future; Expected date: 10/05/2017    Abnormal mammogram  -     Mammo Digital Screening Bilateral With CAD; Future; Expected date: 10/05/2017    Osteopenia, unspecified location  -     Lipid panel; Future; Expected date: 10/05/2017    Pulmonary heart disease  -     CBC auto differential; Future; Expected date: 10/05/2017  -     Comprehensive metabolic panel; Future; Expected date: 10/05/2017  -     Lipid panel; Future; Expected date: 10/05/2017    Atherosclerosis of aorta  -     CBC auto differential; Future; Expected date: 10/05/2017  -     Comprehensive metabolic panel; Future; Expected date: 10/05/2017  -     Lipid panel; Future; Expected date: 10/05/2017    Skin irritation  -     mupirocin (BACTROBAN) 2 % ointment; Apply topically 2 (two) times daily. Apply  to affected area three times a day.  Dispense: 1 Tube; Refill: 0    Other orders  -     Cancel: DXA Bone Density Spine And Hip; Future; Expected date: 10/05/2017

## 2017-10-05 NOTE — TELEPHONE ENCOUNTER
Per the provider I gave a verbal instruction of the Rx directions to apply to topically 2 to 3 times daily as needed . The pharmacists verified understanding.

## 2017-10-06 ENCOUNTER — TELEPHONE (OUTPATIENT)
Dept: INTERNAL MEDICINE | Facility: CLINIC | Age: 82
End: 2017-10-06

## 2017-10-06 DIAGNOSIS — R74.01 ELEVATED ALT MEASUREMENT: Primary | ICD-10-CM

## 2017-10-09 ENCOUNTER — TELEPHONE (OUTPATIENT)
Dept: INTERNAL MEDICINE | Facility: CLINIC | Age: 82
End: 2017-10-09

## 2017-10-09 NOTE — TELEPHONE ENCOUNTER
I contacted the pt and notified her of the repeat CMP  Ordered by the provider and gave her the date , time and location she verbalized understanding.

## 2017-10-30 ENCOUNTER — OFFICE VISIT (OUTPATIENT)
Dept: CARDIOLOGY | Facility: CLINIC | Age: 82
End: 2017-10-30
Payer: MEDICARE

## 2017-10-30 VITALS
SYSTOLIC BLOOD PRESSURE: 120 MMHG | BODY MASS INDEX: 20.4 KG/M2 | DIASTOLIC BLOOD PRESSURE: 60 MMHG | HEART RATE: 69 BPM | HEIGHT: 67 IN | WEIGHT: 130 LBS

## 2017-10-30 DIAGNOSIS — I48.21 PERMANENT ATRIAL FIBRILLATION: ICD-10-CM

## 2017-10-30 DIAGNOSIS — I48.21 ATRIAL FIBRILLATION, PERMANENT: Primary | Chronic | ICD-10-CM

## 2017-10-30 DIAGNOSIS — I70.0 ATHEROSCLEROSIS OF AORTA: ICD-10-CM

## 2017-10-30 DIAGNOSIS — I48.21 PERMANENT ATRIAL FIBRILLATION: Primary | ICD-10-CM

## 2017-10-30 PROCEDURE — 93000 ELECTROCARDIOGRAM COMPLETE: CPT | Mod: S$GLB,,, | Performed by: NUCLEAR MEDICINE

## 2017-10-30 PROCEDURE — 99499 UNLISTED E&M SERVICE: CPT | Mod: S$GLB,,, | Performed by: NUCLEAR MEDICINE

## 2017-10-30 PROCEDURE — 99214 OFFICE O/P EST MOD 30 MIN: CPT | Mod: S$GLB,,, | Performed by: NUCLEAR MEDICINE

## 2017-10-30 PROCEDURE — 99999 PR PBB SHADOW E&M-EST. PATIENT-LVL III: CPT | Mod: PBBFAC,,, | Performed by: NUCLEAR MEDICINE

## 2017-10-30 RX ORDER — MULTIVITAMIN
1 TABLET ORAL DAILY
COMMUNITY

## 2017-10-30 NOTE — PROGRESS NOTES
Subjective:   Patient ID:  Destini Augustine is a 84 y.o. female who presents for follow-up of Atrial Fibrillation      HPI 1-CHRONIC PERMANENT AF,  2- ATHEROSCLEROSIS OF AORTA  ONLY COMPLAIN IS EASY FATIGUE WITH MODERATE EXERTION.  ORDINARY ACTIVITIES WELL TOLERATED  NO EXACERBATION OF PALPITATIONS  NO NEAR SYNCOPE OR SYNCOPE  NO EDEMA. NO CALVE TENDERNESS  NO CHEST DISCOMFORT  NO FOCAL CNS SYMPTOMS OR SIGNS TO SUGGEST TIA OR STROKE  NO ABNORMAL BLEEDING- ON DOAC    Review of Systems   Constitution: Positive for malaise/fatigue. Negative for chills, fever, weakness, night sweats, weight gain and weight loss.   HENT: Negative for nosebleeds.    Eyes: Negative for blurred vision, double vision and visual disturbance.   Cardiovascular: Negative for chest pain, dyspnea on exertion, irregular heartbeat, leg swelling, orthopnea, palpitations, paroxysmal nocturnal dyspnea and syncope.   Respiratory: Negative for cough, hemoptysis and wheezing.    Endocrine: Negative for polydipsia and polyuria.   Hematologic/Lymphatic: Does not bruise/bleed easily.   Skin: Negative for rash.   Musculoskeletal: Negative for joint pain, joint swelling, muscle weakness and myalgias.   Gastrointestinal: Negative for abdominal pain, hematemesis, jaundice and melena.   Genitourinary: Negative for dysuria, hematuria and nocturia.   Neurological: Negative for dizziness, focal weakness, headaches and sensory change.   Psychiatric/Behavioral: Negative for depression. The patient does not have insomnia and is not nervous/anxious.      Family History   Problem Relation Age of Onset    Cataracts Mother     Hypertension Mother     Stroke Mother     Cancer Father      colon    Thyroid disease Father     Cancer Brother      lung    Tuberculosis Brother     Blindness Neg Hx     Diabetes Neg Hx     Macular degeneration Neg Hx     Retinal detachment Neg Hx     Strabismus Neg Hx      Past Medical History:   Diagnosis Date    Arthritis     knees,  hands    Atrial fibrillation     Basal cell carcinoma     face, right thigh    COAG (chronic open-angle glaucoma) - Both Eyes 8/28/2013    Ex-smoker     Hamartoma     mandible    Macular degeneration     Osteopenia     7/14 shama 7/16    Scoliosis 11/30/15    lumbar x-ray     Current Outpatient Prescriptions on File Prior to Visit   Medication Sig Dispense Refill    apixaban (ELIQUIS) 2.5 mg Tab Take 1 tablet (2.5 mg total) by mouth 2 (two) times daily. 60 tablet 5    BIOTIN ORAL Take 1 tablet by mouth 2 (two) times daily.       CALCIUM CARBONATE/VITAMIN D3 (CALCIUM 600 + D,3, ORAL) Take 1 capsule by mouth 2 (two) times daily.      CYANOCOBALAMIN, VITAMIN B-12, (VITAMIN B-12 ORAL) Take 1 capsule by mouth once daily.       DOCOSAHEXANOIC ACID/EPA (FISH OIL ORAL) Take 1 capsule by mouth 2 (two) times daily.       GLUCOSAMINE HCL/CHONDRO COBB A (GLUCOSAMINE-CHONDROITIN ORAL) Take 1 tablet by mouth 2 (two) times daily.       latanoprost 0.005 % ophthalmic solution Place 1 drop into both eyes nightly. 1 Bottle 12    metoprolol succinate (TOPROL-XL) 25 MG 24 hr tablet Take 1 tablet (25 mg total) by mouth once daily. 30 tablet 5    turmeric root extract 500 mg Cap Take by mouth.      vitamin A-vitamin C-vit E-min (OCUVITE) Tab Take 1 tablet by mouth Daily.      [DISCONTINUED] PV W-O ELISEO/FERROUS FUMARATE/FA (M-VIT ORAL) Take 1 tablet by mouth Daily.       No current facility-administered medications on file prior to visit.      Review of patient's allergies indicates:   Allergen Reactions    Shellfish containing products      Other reaction(s): Nausea  Other reaction(s): Diarrhea    Venom-wasp Swelling     Other reaction(s): Itching       Objective:     Physical Exam   Constitutional: She is oriented to person, place, and time. She appears well-developed. No distress.   HENT:   Head: Normocephalic.   Eyes: Conjunctivae are normal. Pupils are equal, round, and reactive to light. No scleral icterus.   Neck:  Normal range of motion. Neck supple. Normal carotid pulses, no hepatojugular reflux and no JVD present. Carotid bruit is not present. No edema present. No thyroid mass and no thyromegaly present.   Cardiovascular: Normal rate, S1 normal, S2 normal, normal heart sounds and intact distal pulses.  An irregularly irregular rhythm present. PMI is not displaced.  Exam reveals no gallop and no friction rub.    No murmur heard.  Pulses:       Carotid pulses are 2+ on the right side, and 2+ on the left side.       Radial pulses are 2+ on the right side, and 2+ on the left side.        Femoral pulses are 2+ on the right side, and 2+ on the left side.       Popliteal pulses are 2+ on the right side, and 2+ on the left side.        Dorsalis pedis pulses are 2+ on the right side, and 2+ on the left side.        Posterior tibial pulses are 2+ on the right side, and 2+ on the left side.   Pulmonary/Chest: Effort normal and breath sounds normal. She has no wheezes. She has no rales. She exhibits no tenderness.   Abdominal: Soft. Bowel sounds are normal. She exhibits no pulsatile midline mass and no mass. There is no hepatosplenomegaly. There is no tenderness.   Musculoskeletal: Normal range of motion. She exhibits no edema or tenderness.        Cervical back: Normal.        Thoracic back: Normal.        Lumbar back: Normal.   Lymphadenopathy:     She has no cervical adenopathy.     She has no axillary adenopathy.        Right: No supraclavicular adenopathy present.        Left: No supraclavicular adenopathy present.   Neurological: She is alert and oriented to person, place, and time. She has normal strength and normal reflexes. No sensory deficit. Gait normal.   Skin: Skin is warm. No rash noted. No cyanosis. No pallor. Nails show no clubbing.   Psychiatric: She has a normal mood and affect. Her speech is normal and behavior is normal. Cognition and memory are normal.       Assessment:     1. Atrial fibrillation, permanent    2.  Atherosclerosis of aorta      ECG TODAY- AF, WITH CONTROLLED VR  STABLE CV STATUS- NO EVIDENCE OF ACTIVE MYOCARDIAL ISCHEMIA- NO ADHF  CNS STATUS STABLE- NO TIA OR STROKE  NO ABNORMAL BLEEDING- ON DOAC  CARD MED WELL TOLERATED  Plan:     Atrial fibrillation, permanent    Atherosclerosis of aorta      1- CONTINUE PRESENT CARD MANAGEMENT    2- RETURN IN 6 MONTHS.

## 2017-11-06 ENCOUNTER — LAB VISIT (OUTPATIENT)
Dept: LAB | Facility: HOSPITAL | Age: 82
End: 2017-11-06
Attending: NURSE PRACTITIONER
Payer: MEDICARE

## 2017-11-06 DIAGNOSIS — R74.01 ELEVATED ALT MEASUREMENT: ICD-10-CM

## 2017-11-06 LAB
ALBUMIN SERPL BCP-MCNC: 3.4 G/DL
ALP SERPL-CCNC: 78 U/L
ALT SERPL W/O P-5'-P-CCNC: 25 U/L
ANION GAP SERPL CALC-SCNC: 6 MMOL/L
AST SERPL-CCNC: 28 U/L
BILIRUB SERPL-MCNC: 0.7 MG/DL
BUN SERPL-MCNC: 20 MG/DL
CALCIUM SERPL-MCNC: 9.7 MG/DL
CHLORIDE SERPL-SCNC: 106 MMOL/L
CO2 SERPL-SCNC: 28 MMOL/L
CREAT SERPL-MCNC: 0.7 MG/DL
EST. GFR  (AFRICAN AMERICAN): >60 ML/MIN/1.73 M^2
EST. GFR  (NON AFRICAN AMERICAN): >60 ML/MIN/1.73 M^2
GLUCOSE SERPL-MCNC: 83 MG/DL
POTASSIUM SERPL-SCNC: 4.4 MMOL/L
PROT SERPL-MCNC: 6.6 G/DL
SODIUM SERPL-SCNC: 140 MMOL/L

## 2017-11-06 PROCEDURE — 80053 COMPREHEN METABOLIC PANEL: CPT | Mod: PO

## 2017-11-06 PROCEDURE — 36415 COLL VENOUS BLD VENIPUNCTURE: CPT | Mod: PO

## 2017-11-07 DIAGNOSIS — I48.19 ATRIAL FIBRILLATION, PERSISTENT: ICD-10-CM

## 2017-11-08 RX ORDER — APIXABAN 2.5 MG/1
TABLET, FILM COATED ORAL
Qty: 60 TABLET | Refills: 5 | Status: SHIPPED | OUTPATIENT
Start: 2017-11-08 | End: 2018-01-03 | Stop reason: SDUPTHER

## 2017-11-15 ENCOUNTER — OFFICE VISIT (OUTPATIENT)
Dept: OPHTHALMOLOGY | Facility: CLINIC | Age: 82
End: 2017-11-15
Payer: MEDICARE

## 2017-11-15 DIAGNOSIS — H40.1132 PRIMARY OPEN ANGLE GLAUCOMA OF BOTH EYES, MODERATE STAGE: Primary | ICD-10-CM

## 2017-11-15 DIAGNOSIS — Z96.1 PSEUDOPHAKIA OF BOTH EYES: ICD-10-CM

## 2017-11-15 DIAGNOSIS — H35.3130 AGE-RELATED MACULAR DEGENERATION, DRY, BOTH EYES: ICD-10-CM

## 2017-11-15 PROCEDURE — 99999 PR PBB SHADOW E&M-EST. PATIENT-LVL II: CPT | Mod: PBBFAC,,, | Performed by: OPHTHALMOLOGY

## 2017-11-15 PROCEDURE — 99499 UNLISTED E&M SERVICE: CPT | Mod: S$GLB,,, | Performed by: OPHTHALMOLOGY

## 2017-11-15 PROCEDURE — 92250 FUNDUS PHOTOGRAPHY W/I&R: CPT | Mod: S$GLB,,, | Performed by: OPHTHALMOLOGY

## 2017-11-15 PROCEDURE — 92014 COMPRE OPH EXAM EST PT 1/>: CPT | Mod: S$GLB,,, | Performed by: OPHTHALMOLOGY

## 2017-11-15 PROCEDURE — 92083 EXTENDED VISUAL FIELD XM: CPT | Mod: S$GLB,,, | Performed by: OPHTHALMOLOGY

## 2017-11-15 NOTE — PROGRESS NOTES
SUBJECTIVE:   Destini Augustine is a 84 y.o. female   Uncorrected distance visual acuity was 20/30 -1 in the right eye and 20/30 -1 in the left eye.   Chief Complaint   Patient presents with    Glaucoma     4m HVF SDP chk        HPI:  HPI     Glaucoma    Additional comments: 4m HVF SDP chk           Comments   Pt states that she has a bit of an allergy located BRIAN. Started about a   week ago. She says it's probably a make-up allergy. She says that it   itches sometimes, but no pain or discomfort. VA stable. 99% compliant with   gtts.     1. Mod COAG OD>OS (Initial 30/26) goal = 17  2. PCIOL OD w/ ECP & Toric IOL 5/20/09  PCIOL OS w/ ECP & Toric IOL 4/22/09  3. AMD  4. PCO OS    OU Latanoprost QHS   Ocuvite  O3FO       Last edited by Laith Bryant, Patient Care Assistant on 11/15/2017  3:12   PM. (History)        Assessment /Plan :  1. Primary open angle glaucoma of both eyes, moderate stage Doing well, IOP within acceptable range relative to target IOP and no evidence of progression. Continue current treatment. Reviewed importance of continued compliance with treatment and follow up.     2. Pseudophakia of both eyes stable   3. Age-related macular degeneration, dry, both eyes stable with current treatment     Return to clinic in 4 months  or as needed.  With IOP Check and GOCT

## 2017-11-18 DIAGNOSIS — I48.19 ATRIAL FIBRILLATION, PERSISTENT: ICD-10-CM

## 2017-11-18 RX ORDER — METOPROLOL SUCCINATE 25 MG/1
TABLET, EXTENDED RELEASE ORAL
Qty: 30 TABLET | Refills: 5 | Status: SHIPPED | OUTPATIENT
Start: 2017-11-18 | End: 2018-01-03 | Stop reason: SDUPTHER

## 2017-11-27 ENCOUNTER — HOSPITAL ENCOUNTER (OUTPATIENT)
Dept: RADIOLOGY | Facility: HOSPITAL | Age: 82
Discharge: HOME | End: 2017-11-27
Attending: NURSE PRACTITIONER
Payer: MEDICARE

## 2017-11-27 VITALS — HEIGHT: 67 IN | WEIGHT: 130 LBS | BODY MASS INDEX: 20.4 KG/M2

## 2017-11-27 DIAGNOSIS — Z12.39 BREAST CANCER SCREENING: ICD-10-CM

## 2017-11-27 DIAGNOSIS — R92.8 ABNORMAL MAMMOGRAM: ICD-10-CM

## 2017-11-27 PROCEDURE — 77063 BREAST TOMOSYNTHESIS BI: CPT | Mod: 26,,, | Performed by: RADIOLOGY

## 2017-11-27 PROCEDURE — 77067 SCR MAMMO BI INCL CAD: CPT | Mod: 26,,, | Performed by: RADIOLOGY

## 2017-11-27 PROCEDURE — 77067 SCR MAMMO BI INCL CAD: CPT | Mod: TC,PO

## 2017-12-01 ENCOUNTER — TELEPHONE (OUTPATIENT)
Dept: INTERNAL MEDICINE | Facility: CLINIC | Age: 82
End: 2017-12-01

## 2017-12-01 NOTE — TELEPHONE ENCOUNTER
----- Message from Estella Thompson NP sent at 12/1/2017  7:57 AM CST -----  Please schedule patient an appointment to discuss DEXA scan and medication management.

## 2017-12-12 ENCOUNTER — TELEPHONE (OUTPATIENT)
Dept: INTERNAL MEDICINE | Facility: CLINIC | Age: 82
End: 2017-12-12

## 2017-12-12 NOTE — TELEPHONE ENCOUNTER
I called the pt to schedule her discussion about her dexascan no answer nor option to leave a message .

## 2018-01-03 DIAGNOSIS — I48.19 ATRIAL FIBRILLATION, PERSISTENT: ICD-10-CM

## 2018-01-03 RX ORDER — METOPROLOL SUCCINATE 25 MG/1
25 TABLET, EXTENDED RELEASE ORAL DAILY
Qty: 90 TABLET | Refills: 3 | Status: SHIPPED | OUTPATIENT
Start: 2018-01-03 | End: 2018-01-18 | Stop reason: SDUPTHER

## 2018-01-04 DIAGNOSIS — H40.1132 PRIMARY OPEN ANGLE GLAUCOMA OF BOTH EYES, MODERATE STAGE: ICD-10-CM

## 2018-01-04 RX ORDER — LATANOPROST 50 UG/ML
1 SOLUTION/ DROPS OPHTHALMIC NIGHTLY
Qty: 3 BOTTLE | Refills: 6 | Status: SHIPPED | OUTPATIENT
Start: 2018-01-04 | End: 2019-02-25 | Stop reason: SDUPTHER

## 2018-01-18 DIAGNOSIS — I48.19 ATRIAL FIBRILLATION, PERSISTENT: ICD-10-CM

## 2018-01-18 RX ORDER — METOPROLOL SUCCINATE 25 MG/1
25 TABLET, EXTENDED RELEASE ORAL DAILY
Qty: 90 TABLET | Refills: 3 | Status: SHIPPED | OUTPATIENT
Start: 2018-01-18 | End: 2019-02-25 | Stop reason: SDUPTHER

## 2018-02-16 ENCOUNTER — PES CALL (OUTPATIENT)
Dept: ADMINISTRATIVE | Facility: CLINIC | Age: 83
End: 2018-02-16

## 2018-03-14 ENCOUNTER — OFFICE VISIT (OUTPATIENT)
Dept: OPHTHALMOLOGY | Facility: CLINIC | Age: 83
End: 2018-03-14
Payer: MEDICARE

## 2018-03-14 DIAGNOSIS — H40.1132 PRIMARY OPEN ANGLE GLAUCOMA OF BOTH EYES, MODERATE STAGE: Primary | ICD-10-CM

## 2018-03-14 DIAGNOSIS — Z96.1 PSEUDOPHAKIA OF BOTH EYES: ICD-10-CM

## 2018-03-14 PROCEDURE — 99499 UNLISTED E&M SERVICE: CPT | Mod: S$GLB,,, | Performed by: OPHTHALMOLOGY

## 2018-03-14 PROCEDURE — 99999 PR PBB SHADOW E&M-EST. PATIENT-LVL II: CPT | Mod: PBBFAC,,, | Performed by: OPHTHALMOLOGY

## 2018-03-14 PROCEDURE — 92133 CPTRZD OPH DX IMG PST SGM ON: CPT | Mod: S$GLB,,, | Performed by: OPHTHALMOLOGY

## 2018-03-14 PROCEDURE — 92012 INTRM OPH EXAM EST PATIENT: CPT | Mod: S$GLB,,, | Performed by: OPHTHALMOLOGY

## 2018-03-14 NOTE — PROGRESS NOTES
SUBJECTIVE:   Destini Augustine is a 84 y.o. female   Uncorrected distance visual acuity was 20/30 -1 in the right eye and 20/30 -1 in the left eye.   Chief Complaint   Patient presents with    Glaucoma     4 month IOP check GOCT        HPI:  HPI     Glaucoma    Additional comments: 4 month IOP check GOCT           Comments   4 month IOP check GOCT  No changes in VA  No pain or discomfort  100% compliant with Latanoprost    1. Mod COAG OD>OS (Initial 30/26) goal = 17  2. PCIOL OD w/ ECP & Toric IOL 5/20/09  PCIOL OS w/ ECP & Toric IOL 4/22/09  3. AMD  4. PCO OS    Latanoprost QHS OU  Ocuvite  HAG  O3FO       Last edited by Kathy Buchanan on 3/14/2018  2:39 PM. (History)        Assessment /Plan :  1. Primary open angle glaucoma of both eyes, moderate stage Doing well, IOP within acceptable range relative to target IOP and no evidence of progression. Continue current treatment. Reviewed importance of continued compliance with treatment and follow up.  Return to clinic in 4 months  or as needed.  With IOP Check     2. Pseudophakia of both eyes

## 2018-03-22 ENCOUNTER — OFFICE VISIT (OUTPATIENT)
Dept: INTERNAL MEDICINE | Facility: CLINIC | Age: 83
End: 2018-03-22
Payer: MEDICARE

## 2018-03-22 VITALS
SYSTOLIC BLOOD PRESSURE: 132 MMHG | TEMPERATURE: 96 F | OXYGEN SATURATION: 96 % | HEART RATE: 88 BPM | DIASTOLIC BLOOD PRESSURE: 64 MMHG | BODY MASS INDEX: 20.76 KG/M2 | WEIGHT: 132.25 LBS | HEIGHT: 67 IN

## 2018-03-22 DIAGNOSIS — I70.0 ATHEROSCLEROSIS OF AORTA: ICD-10-CM

## 2018-03-22 DIAGNOSIS — Z00.00 ENCOUNTER FOR PREVENTIVE HEALTH EXAMINATION: Primary | ICD-10-CM

## 2018-03-22 DIAGNOSIS — H40.1132 PRIMARY OPEN ANGLE GLAUCOMA OF BOTH EYES, MODERATE STAGE: ICD-10-CM

## 2018-03-22 DIAGNOSIS — M85.80 OSTEOPENIA, UNSPECIFIED LOCATION: ICD-10-CM

## 2018-03-22 DIAGNOSIS — Z96.1 PSEUDOPHAKIA OF BOTH EYES: ICD-10-CM

## 2018-03-22 DIAGNOSIS — I48.21 ATRIAL FIBRILLATION, PERMANENT: Chronic | ICD-10-CM

## 2018-03-22 DIAGNOSIS — I27.9 PULMONARY HEART DISEASE: ICD-10-CM

## 2018-03-22 PROCEDURE — G0439 PPPS, SUBSEQ VISIT: HCPCS | Mod: S$GLB,,, | Performed by: PHYSICIAN ASSISTANT

## 2018-03-22 PROCEDURE — 99499 UNLISTED E&M SERVICE: CPT | Mod: S$GLB,,, | Performed by: PHYSICIAN ASSISTANT

## 2018-03-22 PROCEDURE — 99999 PR PBB SHADOW E&M-EST. PATIENT-LVL IV: CPT | Mod: PBBFAC,,, | Performed by: PHYSICIAN ASSISTANT

## 2018-03-22 NOTE — PATIENT INSTRUCTIONS
Counseling and Referral of Other Preventative  (Italic type indicates deductible and co-insurance are waived)    Patient Name: Destini Augustine  Today's Date: 3/22/2018    Health Maintenance       Date Due Completion Date    DEXA SCAN 11/27/2020 11/27/2017    Override on 7/15/2012: Done    TETANUS VACCINE 08/01/2022 8/1/2012    Lipid Panel 10/05/2022 10/5/2017        No orders of the defined types were placed in this encounter.    The following information is provided to all patients.  This information is to help you find resources for any of the problems found today that may be affecting your health:                Living healthy guide: www.[facility].[place].[facility]      Understanding Diabetes: www.diabetes.org      Eating healthy: www.cdc.gov/healthyweight      [facility] home safety checklist: www.cdc.gov/steadi/patient.html      Agency on Aging: www.goea.[place].[facility]      Alcoholics anonymous (AA): www.aa.org      Physical Activity: www.deidra.nih.gov/pf2fcph      Tobacco use: www.quitwithusla.org

## 2018-03-22 NOTE — PROGRESS NOTES
"Destini Augustine presented for a  Medicare AWV and comprehensive Health Risk Assessment today. The following components were reviewed and updated:    · Medical history  · Family History  · Social history  · Allergies and Current Medications  · Health Risk Assessment  · Health Maintenance  · Care Team     ** See Completed Assessments for Annual Wellness Visit within the encounter summary.**       The following assessments were completed:  · Living Situation  · CAGE  · Depression Screening  · Timed Get Up and Go  · Whisper Test  · Cognitive Function Screening  · Nutrition Screening  · ADL Screening  · PAQ Screening    Vitals:    03/22/18 1123   BP: 132/64   BP Location: Right arm   Patient Position: Sitting   BP Method: Small (Manual)   Pulse: 88   Temp: (!) 95.8 °F (35.4 °C)   TempSrc: Tympanic   SpO2: 96%   Weight: 60 kg (132 lb 4.4 oz)   Height: 5' 7" (1.702 m)     Body mass index is 20.72 kg/m².  Physical Exam      Diagnoses and health risks identified today and associated recommendations/orders:    1. Encounter for preventive health examination    2. Atrial fibrillation, permanent  Pt taking Eliquis and metoprolol. Continue current treatment plan as prescribed by your PCP and cardiologist and f/u with them for further management.    3. Atherosclerosis of aorta  L-spine xrays 9/21/06 show atherosclerotic arterial calcifiation. Pt taking Eliquis and metoprolol. Continue current treatment plan as prescribed by your PCP and cardiologist and f/u with them for further management.    4. Pulmonary heart disease  2D Echo 8/22/16: "Pulmonary hypertension. The estimated PA systolic pressure is 46 mmHg." Continue current treatment plan as prescribed by your PCP and cardiologist and f/u with them for further management.    5. Osteopenia, unspecified location  DEXA 11/27/17. Continue current treatment plan as prescribed by your PCP and f/u with your PCP for further management.    6. Pseudophakia of both eyes  Continue current " treatment plan as prescribed by your ophthalmologist and f/u with him for further management.    7. Primary open angle glaucoma of both eyes, moderate stage  Continue current treatment plan as prescribed by your ophthalmologist and f/u with him for further management.    Provided Destini with a 5-10 year written screening schedule and personal prevention plan. Recommendations were developed using the USPSTF age appropriate recommendations. Education, counseling, and referrals were provided as needed. After Visit Summary printed and given to patient which includes a list of additional screenings\tests needed.    Follow-up in about 1 year (around 3/22/2019) for HRA. F/u with PCP Dr. Love as scheduled 3/28/18  for health management.    WOO Garcia

## 2018-03-28 ENCOUNTER — OFFICE VISIT (OUTPATIENT)
Dept: INTERNAL MEDICINE | Facility: CLINIC | Age: 83
End: 2018-03-28
Payer: MEDICARE

## 2018-03-28 VITALS
WEIGHT: 131.81 LBS | SYSTOLIC BLOOD PRESSURE: 106 MMHG | OXYGEN SATURATION: 97 % | HEART RATE: 81 BPM | HEIGHT: 67 IN | TEMPERATURE: 97 F | DIASTOLIC BLOOD PRESSURE: 60 MMHG | BODY MASS INDEX: 20.69 KG/M2

## 2018-03-28 DIAGNOSIS — Z79.01 ANTICOAGULATED: ICD-10-CM

## 2018-03-28 DIAGNOSIS — H61.20 IMPACTED CERUMEN, UNSPECIFIED LATERALITY: ICD-10-CM

## 2018-03-28 DIAGNOSIS — Z00.00 ROUTINE HEALTH MAINTENANCE: Primary | ICD-10-CM

## 2018-03-28 PROCEDURE — 99999 PR PBB SHADOW E&M-EST. PATIENT-LVL III: CPT | Mod: PBBFAC,,, | Performed by: FAMILY MEDICINE

## 2018-03-28 PROCEDURE — 99397 PER PM REEVAL EST PAT 65+ YR: CPT | Mod: S$GLB,,, | Performed by: FAMILY MEDICINE

## 2018-03-28 PROCEDURE — 99499 UNLISTED E&M SERVICE: CPT | Mod: S$GLB,,, | Performed by: FAMILY MEDICINE

## 2018-03-28 RX ORDER — ALENDRONATE SODIUM 70 MG/1
TABLET ORAL
Qty: 12 TABLET | Refills: 3 | Status: SHIPPED | OUTPATIENT
Start: 2018-03-28 | End: 2019-02-25 | Stop reason: SDUPTHER

## 2018-03-28 NOTE — PROGRESS NOTES
Subjective:       Patient ID: Destini Augustine is a 84 y.o. female.    Chief Complaint: here for physical examination and issues  below    HPIafib anticoag utd card  Osteopenia due resume fosamax  Past Medical History:   Diagnosis Date    Arthritis     knees, hands    Atrial fibrillation     Basal cell carcinoma     face, right thigh    COAG (chronic open-angle glaucoma) - Both Eyes 8/28/2013    Ex-smoker     Hamartoma     mandible    Macular degeneration     Osteopenia     7/14 shama 7/16    Scoliosis 11/30/15    lumbar x-ray     Past Surgical History:   Procedure Laterality Date    APPENDECTOMY  1970s    CATARACT EXTRACTION Bilateral     DR. Singleton    hysterectomy  1970s    complete    HYSTERECTOMY      about 39yrs old, partial    rectocele and cystocele  1998 approx    TONSILLECTOMY, ADENOIDECTOMY  7 y/o     Family History   Problem Relation Age of Onset    Cataracts Mother     Hypertension Mother     Stroke Mother     Cancer Father      colon    Thyroid disease Father     Cancer Brother      lung    Tuberculosis Brother     Blindness Neg Hx     Diabetes Neg Hx     Macular degeneration Neg Hx     Retinal detachment Neg Hx     Strabismus Neg Hx      Social History     Social History    Marital status:      Spouse name: N/A    Number of children: 2    Years of education: N/A     Social History Main Topics    Smoking status: Former Smoker     Packs/day: 1.50     Years: 30.00     Quit date: 2/11/1980    Smokeless tobacco: Never Used    Alcohol use Yes      Comment: 2 drinks/ month    Drug use: No    Sexual activity: No     Other Topics Concern    None     Social History Narrative    None       Review of Systems  Cardiovascular: no chest pain  Chest: no shortness of breath  Abd: no abd pain  Remainder review of systems negative    Objective:      Physical Exam   Constitutional: She is oriented to person, place, and time. She appears well-developed and well-nourished. No  distress.   HENT:   Head: Atraumatic.   Nose: Nose normal.   Mouth/Throat: Oropharynx is clear and moist. No oropharyngeal exudate.   bilat ci   Eyes: Conjunctivae and EOM are normal. Pupils are equal, round, and reactive to light. No scleral icterus.   Neck: Normal range of motion. Neck supple. No thyromegaly present.   Cardiovascular: Normal rate, regular rhythm and normal heart sounds.    No murmur heard.  Pulmonary/Chest: Effort normal and breath sounds normal. No respiratory distress. She has no wheezes. She has no rales.   Abdominal: Soft. Bowel sounds are normal. She exhibits no distension and no mass. There is no hepatosplenomegaly. There is no tenderness. There is no rebound and no guarding.   Musculoskeletal: Normal range of motion. She exhibits no edema or tenderness.   Lymphadenopathy:     She has no cervical adenopathy.   Neurological: She is alert and oriented to person, place, and time. No cranial nerve deficit. She exhibits normal muscle tone. Coordination normal.   Skin: Skin is warm. No rash noted. No erythema. No pallor.   Psychiatric: She has a normal mood and affect. Her behavior is normal. Judgment and thought content normal.   Nursing note and vitals reviewed.      Assessment:       1. Routine health maintenance    2. Anticoagulated        Plan:       **f/u card when due  F/u one yr  Shingrix new shingles vaccine  via a pharmacy  Routine health maintenance    Anticoagulated    Impacted cerumen, unspecified laterality  -     Ambulatory referral to ENT    Other orders  -     alendronate (FOSAMAX) 70 MG tablet; Take 1 tablet once weekly in the morning with a full glass of water on an empty stomach. Do not lie down for at least 30 minutes afterwards.  Dispense: 12 tablet; Refill: 3      *

## 2018-04-03 ENCOUNTER — CLINICAL SUPPORT (OUTPATIENT)
Dept: AUDIOLOGY | Facility: CLINIC | Age: 83
End: 2018-04-03
Payer: MEDICARE

## 2018-04-03 ENCOUNTER — OFFICE VISIT (OUTPATIENT)
Dept: OTOLARYNGOLOGY | Facility: CLINIC | Age: 83
End: 2018-04-03
Payer: MEDICARE

## 2018-04-03 VITALS — HEIGHT: 67 IN | WEIGHT: 131 LBS | BODY MASS INDEX: 20.56 KG/M2

## 2018-04-03 DIAGNOSIS — H90.3 SENSORY HEARING LOSS, BILATERAL: Primary | ICD-10-CM

## 2018-04-03 DIAGNOSIS — H61.23 BILATERAL IMPACTED CERUMEN: Primary | ICD-10-CM

## 2018-04-03 PROCEDURE — 99203 OFFICE O/P NEW LOW 30 MIN: CPT | Mod: 25,S$GLB,, | Performed by: PHYSICIAN ASSISTANT

## 2018-04-03 PROCEDURE — 92567 TYMPANOMETRY: CPT | Mod: S$GLB,,, | Performed by: AUDIOLOGIST

## 2018-04-03 PROCEDURE — 92557 COMPREHENSIVE HEARING TEST: CPT | Mod: S$GLB,,, | Performed by: AUDIOLOGIST

## 2018-04-03 PROCEDURE — 99999 PR PBB SHADOW E&M-EST. PATIENT-LVL III: CPT | Mod: PBBFAC,,, | Performed by: PHYSICIAN ASSISTANT

## 2018-04-03 PROCEDURE — 69210 REMOVE IMPACTED EAR WAX UNI: CPT | Mod: S$GLB,,, | Performed by: PHYSICIAN ASSISTANT

## 2018-04-03 NOTE — LETTER
April 3, 2018      Glenn Love MD  [address] [place] [address]           [facility]  [address] [place] [address]  Phone: [phone]  Fax: [phone]          Patient: Destini Augustine   MR Number: [id number]   YOB: 1933   Date of Visit: 4/3/2018       Dear Dr. Glenn Love:    Thank you for referring Destini Augustine to me for evaluation. Attached you will find relevant portions of my assessment and plan of care.    If you have questions, please do not hesitate to call me. I look forward to following Destini Augustine along with you.    Sincerely,    VASILE Bellosure  CC:  No Recipients    If you would like to receive this communication electronically, please contact [email or web address] or [phone] to request more information on [employer] Link access.    For providers and/or their staff who would like to refer a patient to [facility], please contact us through our one-stop-shop provider referral line, [facility], at [phone].    If you feel you have received this communication in error or would no longer like to receive these types of communications, please e-mail [email or web address]

## 2018-04-03 NOTE — PROGRESS NOTES
Subjective:       Patient ID: Destini Augustine is a 85 y.o. female.    Chief Complaint: Cerumen Impaction    Patient is here to see me today for evaluation of a possible wax impaction in bilateral ears.  She has no complaints of hearing loss in the affected ears, and denies pain or drainage.  This has not been an issue in the past.  The patient has not been using any sort of ear drop to soften the wax.      Review of Systems   Constitutional: Negative for chills, fatigue, fever and unexpected weight change.   HENT: Negative for congestion, dental problem, ear discharge, ear pain, facial swelling, hearing loss, nosebleeds, postnasal drip, rhinorrhea, sinus pressure, sneezing, sore throat, tinnitus, trouble swallowing and voice change.    Eyes: Negative for redness, itching and visual disturbance.   Respiratory: Negative for cough, choking, shortness of breath and wheezing.    Cardiovascular: Negative for chest pain and palpitations.   Gastrointestinal: Negative for abdominal pain.        No reflux.   Musculoskeletal: Negative for gait problem.   Skin: Negative for rash.   Neurological: Negative for dizziness, light-headedness and headaches.       Objective:      Physical Exam   Constitutional: She is oriented to person, place, and time. She appears well-developed and well-nourished. No distress.   HENT:   Head: Normocephalic and atraumatic.   Right Ear: Tympanic membrane, external ear and ear canal normal.   Left Ear: Tympanic membrane, external ear and ear canal normal.   Nose: Nose normal. No mucosal edema, rhinorrhea, nasal deformity or septal deviation. No epistaxis. Right sinus exhibits no maxillary sinus tenderness and no frontal sinus tenderness. Left sinus exhibits no maxillary sinus tenderness and no frontal sinus tenderness.   Mouth/Throat: Uvula is midline, oropharynx is clear and moist and mucous membranes are normal. Mucous membranes are not pale and not dry. No dental caries. No oropharyngeal exudate or  posterior oropharyngeal erythema.   Cerumen impaction bilaterally   Eyes: Conjunctivae, EOM and lids are normal. Pupils are equal, round, and reactive to light. Right eye exhibits no chemosis. Left eye exhibits no chemosis. Right conjunctiva is not injected. Left conjunctiva is not injected. No scleral icterus. Right eye exhibits normal extraocular motion and no nystagmus. Left eye exhibits normal extraocular motion and no nystagmus.   Neck: Trachea normal and phonation normal. No tracheal tenderness present. No tracheal deviation present. No thyroid mass and no thyromegaly present.   Cardiovascular: Intact distal pulses.    Pulmonary/Chest: Effort normal. No stridor. No respiratory distress.   Abdominal: She exhibits no distension.   Lymphadenopathy:        Head (right side): No submental, no submandibular, no preauricular, no posterior auricular and no occipital adenopathy present.        Head (left side): No submental, no submandibular, no preauricular, no posterior auricular and no occipital adenopathy present.     She has no cervical adenopathy.   Neurological: She is alert and oriented to person, place, and time. No cranial nerve deficit.   Skin: Skin is warm and dry. No rash noted. No erythema.   Psychiatric: She has a normal mood and affect. Her behavior is normal.       Procedure Note    CHIEF COMPLAINT:  Cerumen Impaction    Description:  The patient was seated in an exam chair.  An ear speculum was placed in the right EAC and was examined under the microscope.  Suction and/or loop curettes were used to remove a large cerumen impaction.  The tympanic membrane was visualized and was normal in appearance.  The procedure was repeated on the left side in a similar fashion.  The TM was intact and normal on this side as well.  The patient tolerated the procedure well.  Assessment:       1. Bilateral impacted cerumen        Plan:        Cerumen impaction:  Removed today without difficulty.  I would recommend the  use of a wax softening drop, either over the counter Debrox or mineral oil, on a weekly basis.  I also instructed the patient to avoid Qtips.

## 2018-04-03 NOTE — PROGRESS NOTES
Destini Augustine was seen 04/03/2018 for an audiological evaluation.  Patient complains of impacted cerumen bilaterally.  She denies hearing loss otherwise.  She denies tinnitus and dizziness, as well as history of noise exposure. Audiogram performed following cerumen management by ARSENIO Andrews.    Results reveal a mild-to-severe sensorineural hearing loss 250-8000 Hz for the right ear, and  mild-to-severe sensorineural hearing loss 250-8000 Hz for the left ear.   Speech Reception Thresholds were  10 dBHL for the right ear and 15 dBHL for the left ear.   Word recognition scores were excellent for the right ear and excellent for the left ear.   Tympanograms were Type A for the right ear and Type Ad for the left ear.    Patient was counseled on the above findings.    REC:  HAC should pt become interested  Annual audiogram

## 2018-04-30 ENCOUNTER — OFFICE VISIT (OUTPATIENT)
Dept: CARDIOLOGY | Facility: CLINIC | Age: 83
End: 2018-04-30
Payer: MEDICARE

## 2018-04-30 VITALS
HEART RATE: 72 BPM | DIASTOLIC BLOOD PRESSURE: 60 MMHG | HEIGHT: 66 IN | SYSTOLIC BLOOD PRESSURE: 110 MMHG | BODY MASS INDEX: 21.51 KG/M2 | WEIGHT: 133.81 LBS

## 2018-04-30 DIAGNOSIS — I48.21 ATRIAL FIBRILLATION, PERMANENT: Primary | Chronic | ICD-10-CM

## 2018-04-30 DIAGNOSIS — I70.0 ATHEROSCLEROSIS OF AORTA: ICD-10-CM

## 2018-04-30 PROCEDURE — 99999 PR PBB SHADOW E&M-EST. PATIENT-LVL III: CPT | Mod: PBBFAC,,, | Performed by: NUCLEAR MEDICINE

## 2018-04-30 PROCEDURE — 99214 OFFICE O/P EST MOD 30 MIN: CPT | Mod: S$GLB,,, | Performed by: NUCLEAR MEDICINE

## 2018-04-30 NOTE — PROGRESS NOTES
Subjective:   Patient ID:  Destini Augustine is a 85 y.o. female who presents for follow-up of Atrial Fibrillation      HPI1- CHRONIC PERMANENT AF  2- ATHEROSCLEROSIS OF AORTA.  DOING WELL. NO EXACERBATION OF PALPITATIONS  NO RECENT HOSPITALIZATIONS OR ED VISITS FOR ACS OR ADHF. OR TIA OR STROKE  NO ABNORMAL BLEEDING- ON NOAC  NO ANGINA OR EQUIVALENT  NO UNUSUAL JACKSON WITH ORDINARY DAILY ACTIVITIES  GOING TO EXERCISE 3-4 TIMES A WEEK, WELL TOLERATED  NO FOCAL CNS SYMPTOMS OR SIGNS TO SUGGEST TIA OR STROKE  CARD MED GOOD COMPLIANCE    Review of Systems   Constitution: Negative for chills, fever, weakness, night sweats, weight gain and weight loss.   HENT: Negative for nosebleeds.    Eyes: Negative for blurred vision, double vision and visual disturbance.   Cardiovascular: Negative for chest pain, dyspnea on exertion, irregular heartbeat, leg swelling, orthopnea, palpitations, paroxysmal nocturnal dyspnea and syncope.   Respiratory: Negative for cough, hemoptysis and wheezing.    Endocrine: Negative for polydipsia and polyuria.   Hematologic/Lymphatic: Does not bruise/bleed easily.   Skin: Negative for rash.   Musculoskeletal: Negative for joint pain, joint swelling, muscle weakness and myalgias.   Gastrointestinal: Negative for abdominal pain, hematemesis, jaundice and melena.   Genitourinary: Negative for dysuria, hematuria and nocturia.   Neurological: Negative for dizziness, focal weakness, headaches and sensory change.   Psychiatric/Behavioral: Negative for depression. The patient does not have insomnia and is not nervous/anxious.      Family History   Problem Relation Age of Onset    Cataracts Mother     Hypertension Mother     Stroke Mother     Cancer Father      colon    Thyroid disease Father     Cancer Brother      lung    Tuberculosis Brother     Blindness Neg Hx     Diabetes Neg Hx     Macular degeneration Neg Hx     Retinal detachment Neg Hx     Strabismus Neg Hx      Past Medical History:    Diagnosis Date    Arthritis     knees, hands    Atrial fibrillation     Basal cell carcinoma     face, right thigh    COAG (chronic open-angle glaucoma) - Both Eyes 8/28/2013    Ex-smoker     Hamartoma     mandible    Macular degeneration     Osteopenia     7/14 shama 7/16    Scoliosis 11/30/15    lumbar x-ray     Current Outpatient Prescriptions on File Prior to Visit   Medication Sig Dispense Refill    alendronate (FOSAMAX) 70 MG tablet Take 1 tablet once weekly in the morning with a full glass of water on an empty stomach. Do not lie down for at least 30 minutes afterwards. 12 tablet 3    apixaban (ELIQUIS) 2.5 mg Tab Take 1 tablet (2.5 mg total) by mouth 2 (two) times daily. 180 tablet 3    BIOTIN ORAL Take 1 tablet by mouth 2 (two) times daily.       CALCIUM CARBONATE/VITAMIN D3 (CALCIUM 600 + D,3, ORAL) Take 1 capsule by mouth 2 (two) times daily.      CYANOCOBALAMIN, VITAMIN B-12, (VITAMIN B-12 ORAL) Take 1 capsule by mouth once daily.       DOCOSAHEXANOIC ACID/EPA (FISH OIL ORAL) Take 1 capsule by mouth 2 (two) times daily.       GLUCOSAMINE HCL/CHONDRO COBB A (GLUCOSAMINE-CHONDROITIN ORAL) Take 1 tablet by mouth 2 (two) times daily.       latanoprost 0.005 % ophthalmic solution Place 1 drop into both eyes nightly. Dispense 90 days supply 3 Bottle 6    metoprolol succinate (TOPROL-XL) 25 MG 24 hr tablet Take 1 tablet (25 mg total) by mouth once daily. 90 tablet 3    multivitamin (ONE DAILY MULTIVITAMIN) per tablet Take 1 tablet by mouth once daily.      turmeric root extract 500 mg Cap Take by mouth.      vitamin A-vitamin C-vit E-min (OCUVITE) Tab Take 1 tablet by mouth Daily.       No current facility-administered medications on file prior to visit.      Review of patient's allergies indicates:   Allergen Reactions    Shellfish containing products      Other reaction(s): Nausea  Other reaction(s): Diarrhea    Venom-wasp Swelling     Other reaction(s): Itching       Objective:      Physical Exam   Constitutional: She is oriented to person, place, and time. She appears well-developed. No distress.   HENT:   Head: Normocephalic.   Eyes: Conjunctivae are normal. Pupils are equal, round, and reactive to light. No scleral icterus.   Neck: Normal range of motion. Neck supple. Normal carotid pulses, no hepatojugular reflux and no JVD present. Carotid bruit is not present. No edema present. No thyroid mass and no thyromegaly present.   Cardiovascular: Normal rate, S1 normal, S2 normal, normal heart sounds and intact distal pulses.  An irregularly irregular rhythm present. PMI is not displaced.  Exam reveals no gallop and no friction rub.    No murmur heard.  Pulses:       Carotid pulses are 2+ on the right side, and 2+ on the left side.       Radial pulses are 2+ on the right side, and 2+ on the left side.        Femoral pulses are 2+ on the right side, and 2+ on the left side.       Popliteal pulses are 2+ on the right side, and 2+ on the left side.        Dorsalis pedis pulses are 2+ on the right side, and 2+ on the left side.        Posterior tibial pulses are 2+ on the right side, and 2+ on the left side.   Pulmonary/Chest: Effort normal and breath sounds normal. She has no wheezes. She has no rales. She exhibits no tenderness.   Abdominal: Soft. Bowel sounds are normal. She exhibits no pulsatile midline mass and no mass. There is no hepatosplenomegaly. There is no tenderness.   Musculoskeletal: Normal range of motion. She exhibits no edema or tenderness.        Cervical back: Normal.        Thoracic back: Normal.        Lumbar back: Normal.   Lymphadenopathy:     She has no cervical adenopathy.     She has no axillary adenopathy.        Right: No supraclavicular adenopathy present.        Left: No supraclavicular adenopathy present.   Neurological: She is alert and oriented to person, place, and time. She has normal strength and normal reflexes. No sensory deficit. Gait normal.   Skin: Skin  is warm. No rash noted. No cyanosis. No pallor. Nails show no clubbing.   Psychiatric: She has a normal mood and affect. Her speech is normal and behavior is normal. Cognition and memory are normal.       Assessment:     1. Atrial fibrillation, permanent    2. Atherosclerosis of aorta      STABLE CV STATUS- CARD ARRHYTHMIAS CONTROLLED  NO EVIDENCE OF ACTIVE MYOCARDIAL ISCHEMIA. NO ADHF  CNS STATUS  STABLE  CARD ME WELL TOLERATED  Plan:     Atrial fibrillation, permanent    Atherosclerosis of aorta      1- CONTINUE PRESENT CARD MANAGEMENT    2- RETURN IN 6 MONTHS.

## 2018-06-08 ENCOUNTER — OFFICE VISIT (OUTPATIENT)
Dept: INTERNAL MEDICINE | Facility: CLINIC | Age: 83
End: 2018-06-08
Payer: MEDICARE

## 2018-06-08 VITALS
TEMPERATURE: 99 F | BODY MASS INDEX: 21.22 KG/M2 | HEIGHT: 66 IN | OXYGEN SATURATION: 95 % | DIASTOLIC BLOOD PRESSURE: 60 MMHG | HEART RATE: 88 BPM | SYSTOLIC BLOOD PRESSURE: 104 MMHG | WEIGHT: 132.06 LBS

## 2018-06-08 DIAGNOSIS — L03.113 CELLULITIS OF RIGHT UPPER EXTREMITY: ICD-10-CM

## 2018-06-08 DIAGNOSIS — Z79.01 ANTICOAGULATED: ICD-10-CM

## 2018-06-08 DIAGNOSIS — I48.21 ATRIAL FIBRILLATION, PERMANENT: Chronic | ICD-10-CM

## 2018-06-08 DIAGNOSIS — T63.481A ALLERGIC REACTION TO INSECT STING, ACCIDENTAL OR UNINTENTIONAL, INITIAL ENCOUNTER: Primary | ICD-10-CM

## 2018-06-08 PROCEDURE — 96372 THER/PROPH/DIAG INJ SC/IM: CPT | Mod: S$GLB,,, | Performed by: NURSE PRACTITIONER

## 2018-06-08 PROCEDURE — 99999 PR PBB SHADOW E&M-EST. PATIENT-LVL IV: CPT | Mod: PBBFAC,,, | Performed by: NURSE PRACTITIONER

## 2018-06-08 PROCEDURE — 99214 OFFICE O/P EST MOD 30 MIN: CPT | Mod: 25,S$GLB,, | Performed by: NURSE PRACTITIONER

## 2018-06-08 RX ORDER — CEPHALEXIN 500 MG/1
500 CAPSULE ORAL EVERY 12 HOURS
Qty: 20 CAPSULE | Refills: 0 | Status: SHIPPED | OUTPATIENT
Start: 2018-06-08 | End: 2018-07-19

## 2018-06-08 RX ORDER — METHYLPREDNISOLONE ACETATE 40 MG/ML
60 INJECTION, SUSPENSION INTRA-ARTICULAR; INTRALESIONAL; INTRAMUSCULAR; SOFT TISSUE
Status: DISCONTINUED | OUTPATIENT
Start: 2018-06-08 | End: 2018-06-08

## 2018-06-08 RX ORDER — METHYLPREDNISOLONE ACETATE 40 MG/ML
40 INJECTION, SUSPENSION INTRA-ARTICULAR; INTRALESIONAL; INTRAMUSCULAR; SOFT TISSUE
Status: COMPLETED | OUTPATIENT
Start: 2018-06-08 | End: 2018-06-08

## 2018-06-08 RX ADMIN — METHYLPREDNISOLONE ACETATE 40 MG: 40 INJECTION, SUSPENSION INTRA-ARTICULAR; INTRALESIONAL; INTRAMUSCULAR; SOFT TISSUE at 01:06

## 2018-06-08 NOTE — PATIENT INSTRUCTIONS
Insect Sting Allergy, Generalized  You are having an allergic reaction to an insect sting. This may occur after a sting by a wasp, honeybee, yellow jacket, or other insect. This may cause an itchy rash and swelling in the face or other parts of the body. A more severe reaction may cause you to feel dizzy, faint, or have trouble breathing or swallowing. Other warning signs are listed below.  Symptoms can include:  · Rash, hives, redness, welts, or blisters in areas other than the sting site  · Itching, burning, stinging, pain in areas other than the sting site  · Dry, flaky, cracking, scaly skin  · Swelling in areas other than the sting site   · Stomach pain or cramps  More severe symptoms include:  · Swelling of the face or lips or drooling  · Trouble swallowing, feeling like your throat is closing  · Trouble breathing, wheezing  · Dizziness or a sudden decrease in blood pressure  · Hoarse voice or trouble speaking  · Severe nausea, vomiting, or diarrhea  · Feeling faint or lightheaded  · Rapid heart rate  Home care  Medicine  The healthcare provider may prescribe medicines to relieve swelling, itching, and pain. Follow the providers instructions when taking these medicines.  · If you had a severe reaction, the provider may prescribe an injectable epinephrine kit. Epinephrine will stop the progression of an allergic reaction. Before you leave the hospital, be sure that you understand when and how to use this medicine.  · Oral diphenhydramine is an over-the-counter antihistamine available at pharmacies and grocery stores. Unless a prescription antihistamine was given, diphenhydramine may be used to reduce itching if large areas of the skin are involved. It may make you sleepy, so be careful using it in the daytime or when going to school, working, or driving. Note: Dont use diphenhydramine if you have glaucoma or if you are a man with trouble urinating due to an enlarged prostate. There are other antihistamines  that cause less drowsiness and are good choices for daytime use. Ask your pharmacist for suggestions.  · Dont use diphenhydramine cream on your skin. It can cause a further reaction in some people.  · Calamine lotion or oatmeal baths sometimes help with itching.  · You may use acetaminophen or ibuprofen to control pain, unless another pain medicine was prescribed. Note: If you have chronic liver or kidney disease or ever had a stomach ulcer or gastrointestinal bleeding, talk with your provider before using these medicines.    General care  Avoid tight clothing and things that heat up your skin (such as hot showers or baths, or direct sunlight). Heat makes the itching worse.  An ice pack will relieve local areas of intense itching and redness. Apply 5 to 10 minutes. To make an ice pack, put ice cubes in a plastic bag that seals at the top. Wrap the bag in a clean, thin towel or cloth. Dont put ice directly on the skin.  Ticks  If you try to remove a tick, do the following:  · Use a set of fine tweezers and  the tick as close to the skin as is possible.  · Pull upwards, using even, steady pressure. Dont jerk or twist the tick. The ticks bodily fluids may contain infection-causing organisms. So dont squeeze, crush, or puncture the body of the tick. Dont use a smoldering match or cigarette, nail polish, petroleum jelly, liquid soap, or kerosene. They may irritate the tick.  · If any mouthparts of the tick remain in the skin, these can be removed with tweezers. If you cant remove the mouth (of a tick) easily with clean tweezers, leave it alone and let the skin heal.  · After the tick is removed, wash the bite area with rubbing alcohol, iodine, or soap and water.  · Put the tick in a sealed container and completely cover it with alcohol. Never try to kill or crush a tick with your hand or fingers.  Stings  Wasps, yellow jackets, and hornets dont leave a stinger behind. But if a honeybee stings you, a stinger  may stay in your skin. The stinger of a honeybee releases a substance that will attract other bees to you. So try to move away from the nest immediately. Once you are away from the nest, then remove the stinger as quickly as possible by:  · Scraping the stinger out with the edge of a dull knife or plastic card (credit card).  · Don't use a tweezer or your fingers to remove the stinger since that may squeeze more toxin from the stinger.  · Wash the affected area with soap and warm water 2 to 3 times a day. Don't break a blister, if present.  · Next apply an ice pack for 5 to 10 minutes. To make an ice pack, put ice cubes in a plastic bag that seals at the top. Wrap the bag in a clean, thin towel or cloth. Dont put ice directly on the skin.  · Contact your healthcare provider and ask what can be used to help decrease the swelling and itching to the affected area.   · To prevent an infection, don't scratch the affected areas. Always check the sting area for signs of an infection: increased redness, swelling, or pain to the affected area.  Preventing future reactions  Future reactions could be worse than this one. So try to avoid situations where you might be stung:  · Don't walk in grass without shoes. Avoid wearing sandals.  · Don't leave food uncovered when eating outside. Sweet treats, watermelon, and ice cream attract insects.  · Don't drink from uncovered sweetened drinks in cans when outside. Insects are attracted to soda drink cans and sometimes crawl inside of them.  · Don't wear bright colored clothes with flowery prints and patterns when outside.  · Dont wear perfume when outside. Smell attracts insects.  · Wear long pants, long-sleeved shirts, socks, and work gloves when working outside.  · Be aware that honeybees nest in trees. Wasps and yellow jackets nest in the ground, trees or roof eaves. Avoid garbage cans when outside.  Auto-injectable epinephrine  · If you are at high risk for another sting due to  where you work or play, or if your reaction included dizziness, fainting or trouble breathing or swallowing, an auto-injectable epinephrine may be prescribed. If not, ask your healthcare provider for one and always carry it with you. Learn how to use the device. If you begin to feel the symptoms of another reaction in the future, use the auto-injectable epinephrine to inject yourself, and then call 911. Don't wait until symptoms become severe.   · Remember that the auto-injectable epinephrine is a rescue medicine only. You still need someone to take you to the hospital or call 911 after you have received the medicine.  Follow-up care  Follow up with your healthcare provider, or as advised if your symptoms do not continue to improve.  Call 911  Call 911 if any of these occur:  · Trouble breathing or swallowing, wheezing   · Cool, moist, pale skin  · Hoarse voice or trouble speaking  · Confused  · Very drowsy or trouble waking up  · Fainting or loss of consciousness  · Rapid heart rate  · Low blood pressure or feeling dizzy or weak  · Feeling of doom  · Severe nausea, vomiting, or diarrhea  · Seizure  · Swelling in the face, eyelids, lips, mouth, throat or tongue  · Drooling  When to seek medical advice  Call your healthcare provider right away if any of the following occur:  · Spreading areas of itching, redness or swelling  · Headache, fever, chills, muscle or joint aching  · Increased pain or swelling  · Signs of infection of the affected area:  ¨ Spreading redness  ¨ Increase in pain or swelling  ¨ Fluid or colored drainage from the affected site  Date Last Reviewed: 3/1/2017  © 7666-9842 [employer], [employer] [address] [place] [address]. All rights reserved. This information is not intended as a substitute for professional medical care. Always follow your healthcare professional's instructions.        Discharge Instructions for Cellulitis  You have been diagnosed with cellulitis. This is an  infection in the deepest layer of the skin. In some cases, the infection also affects the muscle. Cellulitis is caused by bacteria. The bacteria can enter the body through broken skin. This can happen with a cut, scratch, animal bite, or an insect bite that has been scratched. You may have been treated in the hospital with antibiotics and fluids. You will likely be given a prescription for antibiotics to take at home. This sheet will help you take care of yourself at home.  Home care  When you are home:  · Take the prescribed antibiotic medicine you are given as directed until it is gone. Take it even if you feel better. It treats the infection and stops it from returning. Not taking all the medicine can make future infections hard to treat.  · Keep the infected area clean.  · When possible, raise the infected area above the level of your heart. This helps keep swelling down.  · Talk with your healthcare provider if you are in pain. Ask what kind of over-the-counter medicine you can take for pain.  · Apply clean bandages as advised.  · Take your temperature once a day for a week.  · Wash your hands often to prevent spreading the infection.  In the future, wash your hands before and after you touch cuts, scratches, or bandages. This will help prevent infection.   When to call your healthcare provider  Call your healthcare provider immediately if you have any of the following:  · Difficulty or pain when moving the joints above or below the infected area  · Discharge or pus draining from the area  · Fever of 100.4°F (38°C) or higher, or as directed by your healthcare provider  · Pain that gets worse in or around the infected   · Redness that gets worse in or around the infected area, particularly if the area of redness expands to a wider area  · Shaking chills  · Swelling of the infected area  · Vomiting   Date Last Reviewed: 8/1/2016  © 4705-2994 [employer]. [address] [place] [address], [place] [address].  All rights reserved. This information is not intended as a substitute for professional medical care. Always follow your healthcare professional's instructions.

## 2018-06-08 NOTE — PROGRESS NOTES
Subjective:       Patient ID: Destini Augustine is a 85 y.o. female.    Chief Complaint: Insect Bite (rt arm red and swollen)    HPI  Pt reports being stung on her R forearm/hand by several wasps this am while outside her home. She is allergic to wasps. She reports instant, swelling, redness, pain, runny nose. There is no fever, sob, cp, tachycardia, abd pain, dizziness, n/v/d, syncope, confusion or any other acute issues.       Past Medical History:   Diagnosis Date    Arthritis     knees, hands    Atrial fibrillation     Basal cell carcinoma     face, right thigh    COAG (chronic open-angle glaucoma) - Both Eyes 8/28/2013    Ex-smoker     Hamartoma     mandible    Macular degeneration     Osteopenia     7/14 shama 7/16    Scoliosis 11/30/15    lumbar x-ray     Past Surgical History:   Procedure Laterality Date    APPENDECTOMY  1970s    CATARACT EXTRACTION Bilateral     DR. Singleton    hysterectomy  1970s    complete    HYSTERECTOMY      about 39yrs old, partial    rectocele and cystocele  1998 approx    TONSILLECTOMY, ADENOIDECTOMY  5 y/o     Past Surgical History:   Procedure Laterality Date    APPENDECTOMY  1970s    CATARACT EXTRACTION Bilateral     DR. Singleton    hysterectomy  1970s    complete    HYSTERECTOMY      about 39yrs old, partial    rectocele and cystocele  1998 approx    TONSILLECTOMY, ADENOIDECTOMY  5 y/o     Social History     Social History    Marital status:      Spouse name: N/A    Number of children: 2    Years of education: N/A     Occupational History    Not on file.     Social History Main Topics    Smoking status: Former Smoker     Packs/day: 1.50     Years: 30.00     Quit date: 2/11/1980    Smokeless tobacco: Never Used    Alcohol use Yes      Comment: 2 drinks/ month    Drug use: No    Sexual activity: No     Other Topics Concern    Not on file     Social History Narrative    No narrative on file     Review of patient's allergies indicates:   Allergen  Reactions    Shellfish containing products      Other reaction(s): Nausea  Other reaction(s): Diarrhea    Venom-wasp Swelling     Other reaction(s): Itching     Current Outpatient Prescriptions   Medication Sig    alendronate (FOSAMAX) 70 MG tablet Take 1 tablet once weekly in the morning with a full glass of water on an empty stomach. Do not lie down for at least 30 minutes afterwards.    apixaban (ELIQUIS) 2.5 mg Tab Take 1 tablet (2.5 mg total) by mouth 2 (two) times daily.    BIOTIN ORAL Take 1 tablet by mouth 2 (two) times daily.     CALCIUM CARBONATE/VITAMIN D3 (CALCIUM 600 + D,3, ORAL) Take 1 capsule by mouth 2 (two) times daily.    CYANOCOBALAMIN, VITAMIN B-12, (VITAMIN B-12 ORAL) Take 1 capsule by mouth once daily.     DOCOSAHEXANOIC ACID/EPA (FISH OIL ORAL) Take 1 capsule by mouth 2 (two) times daily.     GLUCOSAMINE HCL/CHONDRO COBB A (GLUCOSAMINE-CHONDROITIN ORAL) Take 1 tablet by mouth 2 (two) times daily.     latanoprost 0.005 % ophthalmic solution Place 1 drop into both eyes nightly. Dispense 90 days supply    metoprolol succinate (TOPROL-XL) 25 MG 24 hr tablet Take 1 tablet (25 mg total) by mouth once daily.    multivitamin (ONE DAILY MULTIVITAMIN) per tablet Take 1 tablet by mouth once daily.    turmeric root extract 500 mg Cap Take by mouth.    vitamin A-vitamin C-vit E-min (OCUVITE) Tab Take 1 tablet by mouth Daily.    cephALEXin (KEFLEX) 500 MG capsule Take 1 capsule (500 mg total) by mouth every 12 (twelve) hours.     No current facility-administered medications for this visit.            Review of Systems   Constitutional: Negative for activity change, appetite change, chills, diaphoresis, fatigue, fever and unexpected weight change.   HENT: Positive for rhinorrhea. Negative for congestion, ear pain, postnasal drip, sinus pain, sinus pressure, sneezing, sore throat, tinnitus, trouble swallowing and voice change.    Eyes: Negative for photophobia, pain and visual disturbance.    Respiratory: Negative for cough, chest tightness, shortness of breath and wheezing.    Cardiovascular: Negative for chest pain, palpitations and leg swelling.   Gastrointestinal: Negative for abdominal distention, abdominal pain, constipation, diarrhea, nausea and vomiting.   Genitourinary: Negative for dysuria.   Musculoskeletal: Negative for arthralgias, back pain, joint swelling, neck pain and neck stiffness.   Skin: Positive for color change.        Arm swelling, redness   Allergic/Immunologic: Negative for immunocompromised state.   Neurological: Negative for dizziness, tremors, seizures, syncope, facial asymmetry, speech difficulty, weakness, light-headedness, numbness and headaches.   Hematological: Negative for adenopathy. Does not bruise/bleed easily.   Psychiatric/Behavioral: Negative for confusion and sleep disturbance.       Objective:      Physical Exam   Constitutional: She is oriented to person, place, and time.   HENT:   Nose: Rhinorrhea present.   Eyes: Conjunctivae and EOM are normal. Pupils are equal, round, and reactive to light.   Neck: Normal range of motion. Neck supple.   Cardiovascular: Normal rate.  An irregularly irregular rhythm present.   Pulmonary/Chest: Effort normal and breath sounds normal.   Neurological: She is alert and oriented to person, place, and time.   Skin: Skin is warm and dry.   Right forearm/hand is swollen (+2 nonpitting), erythematous and tender to touch. There is no broken skin or drainage.    Psychiatric: She has a normal mood and affect.       Assessment:     Vitals:    06/08/18 1320   BP: 104/60   Pulse: 88   Temp: 99.1 °F (37.3 °C)         1. Allergic reaction to insect sting, accidental or unintentional, initial encounter    2. Cellulitis of right upper extremity    3. Atrial fibrillation, permanent    4. Anticoagulated        Plan:   Allergic reaction to insect sting, accidental or unintentional, initial encounter  -     Discontinue: methylPREDNISolone acetate  injection 60 mg; Inject 1.5 mLs (60 mg total) into the muscle one time.  -     methylPREDNISolone acetate injection 40 mg; Inject 1 mL (40 mg total) into the muscle one time.    Cellulitis of right upper extremity  -     cephALEXin (KEFLEX) 500 MG capsule; Take 1 capsule (500 mg total) by mouth every 12 (twelve) hours.  Dispense: 20 capsule; Refill: 0    Atrial fibrillation, permanent    Anticoagulated    all medications and side effects discussed in detail    Insect Sting Allergy, Generalized  You are having an allergic reaction to an insect sting. This may occur after a sting by a wasp, honeybee, yellow jacket, or other insect. This may cause an itchy rash and swelling in the face or other parts of the body. A more severe reaction may cause you to feel dizzy, faint, or have trouble breathing or swallowing. Other warning signs are listed below.  Symptoms can include:  · Rash, hives, redness, welts, or blisters in areas other than the sting site  · Itching, burning, stinging, pain in areas other than the sting site  · Dry, flaky, cracking, scaly skin  · Swelling in areas other than the sting site   · Stomach pain or cramps  More severe symptoms include:  · Swelling of the face or lips or drooling  · Trouble swallowing, feeling like your throat is closing  · Trouble breathing, wheezing  · Dizziness or a sudden decrease in blood pressure  · Hoarse voice or trouble speaking  · Severe nausea, vomiting, or diarrhea  · Feeling faint or lightheaded  · Rapid heart rate  Home care  Medicine  The healthcare provider may prescribe medicines to relieve swelling, itching, and pain. Follow the providers instructions when taking these medicines.  · If you had a severe reaction, the provider may prescribe an injectable epinephrine kit. Epinephrine will stop the progression of an allergic reaction. Before you leave the hospital, be sure that you understand when and how to use this medicine.  · Oral diphenhydramine is an  over-the-counter antihistamine available at pharmacies and grocery stores. Unless a prescription antihistamine was given, diphenhydramine may be used to reduce itching if large areas of the skin are involved. It may make you sleepy, so be careful using it in the daytime or when going to school, working, or driving. Note: Dont use diphenhydramine if you have glaucoma or if you are a man with trouble urinating due to an enlarged prostate. There are other antihistamines that cause less drowsiness and are good choices for daytime use. Ask your pharmacist for suggestions.  · Dont use diphenhydramine cream on your skin. It can cause a further reaction in some people.  · Calamine lotion or oatmeal baths sometimes help with itching.  · You may use acetaminophen or ibuprofen to control pain, unless another pain medicine was prescribed. Note: If you have chronic liver or kidney disease or ever had a stomach ulcer or gastrointestinal bleeding, talk with your provider before using these medicines.    General care  Avoid tight clothing and things that heat up your skin (such as hot showers or baths, or direct sunlight). Heat makes the itching worse.  An ice pack will relieve local areas of intense itching and redness. Apply 5 to 10 minutes. To make an ice pack, put ice cubes in a plastic bag that seals at the top. Wrap the bag in a clean, thin towel or cloth. Dont put ice directly on the skin.  ·   Stings  Wasps, yellow jackets, and hornets dont leave a stinger behind. But if a honeybee stings you, a stinger may stay in your skin. The stinger of a honeybee releases a substance that will attract other bees to you. So try to move away from the nest immediately. Once you are away from the nest, then remove the stinger as quickly as possible by:  · Scraping the stinger out with the edge of a dull knife or plastic card (credit card).  · Don't use a tweezer or your fingers to remove the stinger since that may squeeze more toxin  from the stinger.  · Wash the affected area with soap and warm water 2 to 3 times a day. Don't break a blister, if present.  · Next apply an ice pack for 5 to 10 minutes. To make an ice pack, put ice cubes in a plastic bag that seals at the top. Wrap the bag in a clean, thin towel or cloth. Dont put ice directly on the skin.  · Contact your healthcare provider and ask what can be used to help decrease the swelling and itching to the affected area.   · To prevent an infection, don't scratch the affected areas. Always check the sting area for signs of an infection: increased redness, swelling, or pain to the affected area.  Preventing future reactions  Future reactions could be worse than this one. So try to avoid situations where you might be stung:  · Don't walk in grass without shoes. Avoid wearing sandals.  · Don't leave food uncovered when eating outside. Sweet treats, watermelon, and ice cream attract insects.  · Don't drink from uncovered sweetened drinks in cans when outside. Insects are attracted to soda drink cans and sometimes crawl inside of them.  · Don't wear bright colored clothes with flowery prints and patterns when outside.  · Dont wear perfume when outside. Smell attracts insects.  · Wear long pants, long-sleeved shirts, socks, and work gloves when working outside.  · Be aware that honeybees nest in trees. Wasps and yellow jackets nest in the ground, trees or roof eaves. Avoid garbage cans when outside.  Auto-injectable epinephrine  · If you are at high risk for another sting due to where you work or play, or if your reaction included dizziness, fainting or trouble breathing or swallowing, an auto-injectable epinephrine may be prescribed. If not, ask your healthcare provider for one and always carry it with you. Learn how to use the device. If you begin to feel the symptoms of another reaction in the future, use the auto-injectable epinephrine to inject yourself, and then call 911. Don't wait  until symptoms become severe.   · Remember that the auto-injectable epinephrine is a rescue medicine only. You still need someone to take you to the hospital or call 911 after you have received the medicine.  Follow-up care  Follow up with your healthcare provider, or as advised if your symptoms do not continue to improve.  Call 911  Call 911 if any of these occur:  · Trouble breathing or swallowing, wheezing   · Cool, moist, pale skin  · Hoarse voice or trouble speaking  · Confused  · Very drowsy or trouble waking up  · Fainting or loss of consciousness  · Rapid heart rate  · Low blood pressure or feeling dizzy or weak  · Feeling of doom  · Severe nausea, vomiting, or diarrhea  · Seizure  · Swelling in the face, eyelids, lips, mouth, throat or tongue  · Drooling  When to seek medical advice  Call your healthcare provider right away if any of the following occur:  · Spreading areas of itching, redness or swelling  · Headache, fever, chills, muscle or joint aching  · Increased pain or swelling  · Signs of infection of the affected area:  ¨ Spreading redness  ¨ Increase in pain or swelling  ¨ Fluid or colored drainage from the affected site      Discharge Instructions for Cellulitis  You have been diagnosed with cellulitis. This is an infection in the deepest layer of the skin. In some cases, the infection also affects the muscle. Cellulitis is caused by bacteria. The bacteria can enter the body through broken skin. This can happen with a cut, scratch, animal bite, or an insect bite that has been scratched. You may have been treated in the hospital with antibiotics and fluids. You will likely be given a prescription for antibiotics to take at home. This sheet will help you take care of yourself at home.  Home care  When you are home:  · Take the prescribed antibiotic medicine you are given as directed until it is gone. Take it even if you feel better. It treats the infection and stops it from returning. Not taking all  the medicine can make future infections hard to treat.  · Keep the infected area clean.  · When possible, raise the infected area above the level of your heart. This helps keep swelling down.  · Talk with your healthcare provider if you are in pain. Ask what kind of over-the-counter medicine you can take for pain.  · Apply clean bandages as advised.  · Take your temperature once a day for a week.  · Wash your hands often to prevent spreading the infection.  In the future, wash your hands before and after you touch cuts, scratches, or bandages. This will help prevent infection.   When to call your healthcare provider  Call your healthcare provider immediately if you have any of the following:  · Difficulty or pain when moving the joints above or below the infected area  · Discharge or pus draining from the area  · Fever of 100.4°F (38°C) or higher, or as directed by your healthcare provider  · Pain that gets worse in or around the infected   · Redness that gets worse in or around the infected area, particularly if the area of redness expands to a wider area  · Shaking chills  · Swelling of the infected area  · Vomiting

## 2018-07-19 ENCOUNTER — OFFICE VISIT (OUTPATIENT)
Dept: OPHTHALMOLOGY | Facility: CLINIC | Age: 83
End: 2018-07-19
Payer: MEDICARE

## 2018-07-19 DIAGNOSIS — Z96.1 PSEUDOPHAKIA OF BOTH EYES: ICD-10-CM

## 2018-07-19 DIAGNOSIS — H40.1132 PRIMARY OPEN ANGLE GLAUCOMA OF BOTH EYES, MODERATE STAGE: Primary | ICD-10-CM

## 2018-07-19 PROCEDURE — 99999 PR PBB SHADOW E&M-EST. PATIENT-LVL II: CPT | Mod: PBBFAC,,, | Performed by: OPHTHALMOLOGY

## 2018-07-19 PROCEDURE — 92012 INTRM OPH EXAM EST PATIENT: CPT | Mod: S$GLB,,, | Performed by: OPHTHALMOLOGY

## 2018-07-19 NOTE — PROGRESS NOTES
SUBJECTIVE:   Destini Augustine is a 85 y.o. female   Uncorrected distance visual acuity was 20/50 +1 in the right eye and 20/50 +1 in the left eye.   Chief Complaint   Patient presents with    Glaucoma     4 month IOP check        HPI:  HPI     Glaucoma    Additional comments: 4 month IOP check           Comments   4 month IOP check  No changes noted in [facility]  No pain or discomfort  100% compliant with drop    1. Mod COAG OD>OS (Initial 30/26) goal = 17  2. PCIOL OD w/ ECP & Toric IOL 5/20/09  PCIOL OS w/ ECP & Toric IOL 4/22/09  3. AMD  4. PCO OS    Latanoprost QHS OU  Ocuvite  HAG  O3FO       Last edited by Kathy Buchanan on 7/19/2018  3:08 PM. (History)        Assessment /Plan :  1. Primary open angle glaucoma of both eyes, moderate stage Doing well, IOP within acceptable range relative to target IOP and no evidence of progression. Continue current treatment. Reviewed importance of continued compliance with treatment and follow up.     2. Pseudophakia of both eyes  -- Condition stable, no therapeutic change required. Monitoring routinely.       Return to clinic in 4 months  or as needed.  With 24-2 HVF, Dilation and SDP's

## 2018-10-17 DIAGNOSIS — I48.21 PERMANENT ATRIAL FIBRILLATION: Primary | ICD-10-CM

## 2018-10-22 ENCOUNTER — OFFICE VISIT (OUTPATIENT)
Dept: OPHTHALMOLOGY | Facility: CLINIC | Age: 83
End: 2018-10-22
Payer: MEDICARE

## 2018-10-22 ENCOUNTER — CLINICAL SUPPORT (OUTPATIENT)
Dept: CARDIOLOGY | Facility: CLINIC | Age: 83
End: 2018-10-22
Payer: MEDICARE

## 2018-10-22 ENCOUNTER — OFFICE VISIT (OUTPATIENT)
Dept: CARDIOLOGY | Facility: CLINIC | Age: 83
End: 2018-10-22
Payer: MEDICARE

## 2018-10-22 VITALS
BODY MASS INDEX: 21.21 KG/M2 | HEIGHT: 66 IN | WEIGHT: 132 LBS | DIASTOLIC BLOOD PRESSURE: 70 MMHG | HEART RATE: 64 BPM | SYSTOLIC BLOOD PRESSURE: 132 MMHG

## 2018-10-22 DIAGNOSIS — I48.21 PERMANENT ATRIAL FIBRILLATION: ICD-10-CM

## 2018-10-22 DIAGNOSIS — Z96.1 PSEUDOPHAKIA OF BOTH EYES: ICD-10-CM

## 2018-10-22 DIAGNOSIS — I48.21 ATRIAL FIBRILLATION, PERMANENT: Primary | Chronic | ICD-10-CM

## 2018-10-22 DIAGNOSIS — H35.3131 EARLY DRY STAGE NONEXUDATIVE AGE-RELATED MACULAR DEGENERATION OF BOTH EYES: ICD-10-CM

## 2018-10-22 DIAGNOSIS — H40.1132 PRIMARY OPEN ANGLE GLAUCOMA OF BOTH EYES, MODERATE STAGE: Primary | ICD-10-CM

## 2018-10-22 DIAGNOSIS — I70.0 ATHEROSCLEROSIS OF AORTA: ICD-10-CM

## 2018-10-22 PROCEDURE — 93005 ELECTROCARDIOGRAM TRACING: CPT | Mod: PBBFAC,PO | Performed by: NUCLEAR MEDICINE

## 2018-10-22 PROCEDURE — 99999 PR PBB SHADOW E&M-EST. PATIENT-LVL I: CPT | Mod: PBBFAC,,, | Performed by: OPTOMETRIST

## 2018-10-22 PROCEDURE — 92014 COMPRE OPH EXAM EST PT 1/>: CPT | Mod: S$PBB,,, | Performed by: OPTOMETRIST

## 2018-10-22 PROCEDURE — 92083 EXTENDED VISUAL FIELD XM: CPT | Mod: PBBFAC,PO | Performed by: OPTOMETRIST

## 2018-10-22 PROCEDURE — 93010 ELECTROCARDIOGRAM REPORT: CPT | Mod: S$PBB,,, | Performed by: NUCLEAR MEDICINE

## 2018-10-22 PROCEDURE — 1101F PT FALLS ASSESS-DOCD LE1/YR: CPT | Mod: CPTII,,, | Performed by: NUCLEAR MEDICINE

## 2018-10-22 PROCEDURE — 92250 FUNDUS PHOTOGRAPHY W/I&R: CPT | Mod: PBBFAC,PO | Performed by: OPTOMETRIST

## 2018-10-22 PROCEDURE — 99211 OFF/OP EST MAY X REQ PHY/QHP: CPT | Mod: PBBFAC,27,PO | Performed by: OPTOMETRIST

## 2018-10-22 PROCEDURE — 99213 OFFICE O/P EST LOW 20 MIN: CPT | Mod: PBBFAC,PO,25 | Performed by: NUCLEAR MEDICINE

## 2018-10-22 PROCEDURE — 99999 PR PBB SHADOW E&M-EST. PATIENT-LVL III: CPT | Mod: PBBFAC,,, | Performed by: NUCLEAR MEDICINE

## 2018-10-22 PROCEDURE — 99214 OFFICE O/P EST MOD 30 MIN: CPT | Mod: S$PBB,,, | Performed by: NUCLEAR MEDICINE

## 2018-10-22 NOTE — PROGRESS NOTES
Subjective:   Patient ID:  Destini Augustine is a 85 y.o. female who presents for follow-up of Atrial Fibrillation (6 month followup)      HPI1- CHRONIC PERMANENT AF. 2- THORACIC AORTA  ATHEROSCLEROSIS  DOING WELL. ORDINARY AND RECREATIONAL ACTIVITIES WELL TOLERATED  NO EXACERBATION OF PALPITATIONS.   NO RECENT HOSPITALIZATIONS OR ED VISITS FOR ADHF.OR ACS   NO FOCAL CNS SYMPTOMS OR SIGNS TO SUGGEST TIA OR STROKE  NO ABNORMAL BLEEDING- ON NOAC- ELIQUIS  NO ANGINA  NO INTERMITTENT CLAUDICATION  NO ABDOMINAL DISCOMFORT      Review of Systems   Constitution: Negative for chills, fever, weakness, night sweats, weight gain and weight loss.   HENT: Negative for nosebleeds.    Eyes: Negative for blurred vision, double vision and visual disturbance.   Cardiovascular: Negative for chest pain, dyspnea on exertion, irregular heartbeat, leg swelling, orthopnea, palpitations, paroxysmal nocturnal dyspnea and syncope.   Respiratory: Negative for cough, hemoptysis and wheezing.    Endocrine: Negative for polydipsia and polyuria.   Hematologic/Lymphatic: Does not bruise/bleed easily.   Skin: Negative for rash.   Musculoskeletal: Negative for joint pain, joint swelling, muscle weakness and myalgias.   Gastrointestinal: Negative for abdominal pain, hematemesis, jaundice and melena.   Genitourinary: Negative for dysuria, hematuria and nocturia.   Neurological: Negative for dizziness, focal weakness, headaches and sensory change.   Psychiatric/Behavioral: Negative for depression. The patient does not have insomnia and is not nervous/anxious.      Family History   Problem Relation Age of Onset    Cataracts Mother     Hypertension Mother     Stroke Mother     Cancer Father         colon    Thyroid disease Father     Cancer Brother         lung    Tuberculosis Brother     Blindness Neg Hx     Diabetes Neg Hx     Macular degeneration Neg Hx     Retinal detachment Neg Hx     Strabismus Neg Hx      Past Medical History:   Diagnosis  Date    Arthritis     knees, hands    Atrial fibrillation     Basal cell carcinoma     face, right thigh    COAG (chronic open-angle glaucoma) - Both Eyes 8/28/2013    Ex-smoker     Hamartoma     mandible    Macular degeneration     Osteopenia     7/14 shama 7/16    Scoliosis 11/30/15    lumbar x-ray     Current Outpatient Medications on File Prior to Visit   Medication Sig Dispense Refill    alendronate (FOSAMAX) 70 MG tablet Take 1 tablet once weekly in the morning with a full glass of water on an empty stomach. Do not lie down for at least 30 minutes afterwards. 12 tablet 3    apixaban (ELIQUIS) 2.5 mg Tab Take 1 tablet (2.5 mg total) by mouth 2 (two) times daily. 180 tablet 3    BIOTIN ORAL Take 1 tablet by mouth 2 (two) times daily.       CALCIUM CARBONATE/VITAMIN D3 (CALCIUM 600 + D,3, ORAL) Take 1 capsule by mouth 2 (two) times daily.      CYANOCOBALAMIN, VITAMIN B-12, (VITAMIN B-12 ORAL) Take 1 capsule by mouth once daily.       DOCOSAHEXANOIC ACID/EPA (FISH OIL ORAL) Take 1 capsule by mouth 2 (two) times daily.       GLUCOSAMINE HCL/CHONDRO COBB A (GLUCOSAMINE-CHONDROITIN ORAL) Take 1 tablet by mouth 2 (two) times daily.       latanoprost 0.005 % ophthalmic solution Place 1 drop into both eyes nightly. Dispense 90 days supply 3 Bottle 6    metoprolol succinate (TOPROL-XL) 25 MG 24 hr tablet Take 1 tablet (25 mg total) by mouth once daily. 90 tablet 3    multivitamin (ONE DAILY MULTIVITAMIN) per tablet Take 1 tablet by mouth once daily.      turmeric root extract 500 mg Cap Take by mouth.      vitamin A-vitamin C-vit E-min (OCUVITE) Tab Take 1 tablet by mouth Daily.       No current facility-administered medications on file prior to visit.      Review of patient's allergies indicates:   Allergen Reactions    Shellfish containing products      Other reaction(s): Nausea  Other reaction(s): Diarrhea    Venom-wasp Swelling     Other reaction(s): Itching       Objective:     Physical Exam    Constitutional: She is oriented to person, place, and time. She appears well-developed. No distress.   HENT:   Head: Normocephalic.   Eyes: Conjunctivae are normal. Pupils are equal, round, and reactive to light. No scleral icterus.   Neck: Normal range of motion. Neck supple. Normal carotid pulses, no hepatojugular reflux and no JVD present. Carotid bruit is not present. No edema present. No thyroid mass and no thyromegaly present.   Cardiovascular: Normal rate, S1 normal, S2 normal, normal heart sounds and intact distal pulses. An irregularly irregular rhythm present. PMI is not displaced. Exam reveals no gallop and no friction rub.   No murmur heard.  Pulses:       Carotid pulses are 2+ on the right side, and 2+ on the left side.       Radial pulses are 2+ on the right side, and 2+ on the left side.        Femoral pulses are 2+ on the right side, and 2+ on the left side.       Popliteal pulses are 2+ on the right side, and 2+ on the left side.        Dorsalis pedis pulses are 2+ on the right side, and 2+ on the left side.        Posterior tibial pulses are 2+ on the right side, and 2+ on the left side.   Pulmonary/Chest: Effort normal and breath sounds normal. She has no wheezes. She has no rales. She exhibits no tenderness.   Abdominal: Soft. Bowel sounds are normal. She exhibits no pulsatile midline mass and no mass. There is no hepatosplenomegaly. There is no tenderness.   Musculoskeletal: Normal range of motion. She exhibits no edema or tenderness.        Cervical back: Normal.        Thoracic back: Normal.        Lumbar back: Normal.   Lymphadenopathy:     She has no cervical adenopathy.     She has no axillary adenopathy.        Right: No supraclavicular adenopathy present.        Left: No supraclavicular adenopathy present.   Neurological: She is alert and oriented to person, place, and time. She has normal strength and normal reflexes. No sensory deficit. Gait normal.   Skin: Skin is warm. No rash  noted. No cyanosis. No pallor. Nails show no clubbing.   Psychiatric: She has a normal mood and affect. Her speech is normal and behavior is normal. Cognition and memory are normal.       Assessment:     1. Atrial fibrillation, permanent    2. Atherosclerosis of aorta      ECG TODAY- AF WITH CONTROLLED VR 64 BMP. NO ACUTE ST T CHANGES  NO CLINICAL EVIDENCE OF ACTIVE MYOCARDIAL ISCHEMIA. NO ADHF  CNS STATUS STABLE  NO ABNORMAL BLEEDING  Plan:     Atrial fibrillation, permanent    Atherosclerosis of aorta      1- CONTINUE PRESENT CARD MANAGEMENT    2- RETURN IN 6 MONTHS.

## 2018-10-22 NOTE — PROGRESS NOTES
HPI     Glaucoma      Additional comments: 24-2 HVF, Dilation and SDP's              Comments     PT was last seen on 7/19/18 with [facility]. PT was told to rtc 4 months for   24-2 HVF, Dilation and SDP's    1. Mod COAG OD>OS (Initial 30/26) goal = 17  2. PCIOL OD w/ ECP & Toric IOL 5/20/09  PCIOL OS w/ ECP & Toric IOL 4/22/09  3. AMD  4. PCO OS     Latanoprost QHS OU  Ocuvite  HAG  O3FO           Last edited by Mindy Bneder MA on 10/22/2018  1:44 PM. (History)            Assessment /Plan     For exam results, see Encounter Report.    Primary open angle glaucoma of both eyes, moderate stage  -     Color Fundus Photography - OU - Both Eyes  -     Max Visual Field - OU - Extended - Both Eyes  IOP stable today and within acceptable range OU  Continue current treatment  Monitor 4 months    Latanoprost QHS OU    Early dry stage nonexudative age-related macular degeneration of both eyes  va stable  Continue ocuvite  Monitor 12 months    Pseudophakia of both eyes  Doing well OU  Monitor 12 months      RTC 4 months for IOP check and gOCT with [facility] or PRN  Discussed above and all questions were answered.

## 2018-11-11 ENCOUNTER — PATIENT MESSAGE (OUTPATIENT)
Dept: INTERNAL MEDICINE | Facility: CLINIC | Age: 83
End: 2018-11-11

## 2018-11-13 ENCOUNTER — PATIENT MESSAGE (OUTPATIENT)
Dept: INTERNAL MEDICINE | Facility: CLINIC | Age: 83
End: 2018-11-13

## 2018-11-14 ENCOUNTER — TELEPHONE (OUTPATIENT)
Dept: INTERNAL MEDICINE | Facility: CLINIC | Age: 83
End: 2018-11-14

## 2018-11-14 ENCOUNTER — PATIENT MESSAGE (OUTPATIENT)
Dept: INTERNAL MEDICINE | Facility: CLINIC | Age: 83
End: 2018-11-14

## 2018-11-14 DIAGNOSIS — Z12.39 BREAST CANCER SCREENING: Primary | ICD-10-CM

## 2018-11-28 ENCOUNTER — HOSPITAL ENCOUNTER (OUTPATIENT)
Dept: RADIOLOGY | Facility: HOSPITAL | Age: 83
Discharge: HOME | End: 2018-11-28
Attending: FAMILY MEDICINE
Payer: MEDICARE

## 2018-11-28 VITALS — BODY MASS INDEX: 21.21 KG/M2 | WEIGHT: 132 LBS | HEIGHT: 66 IN

## 2018-11-28 DIAGNOSIS — Z12.39 BREAST CANCER SCREENING: ICD-10-CM

## 2018-11-28 PROCEDURE — 77067 SCR MAMMO BI INCL CAD: CPT | Mod: TC,HCNC,PO

## 2018-11-28 PROCEDURE — 77063 BREAST TOMOSYNTHESIS BI: CPT | Mod: 26,HCNC,, | Performed by: RADIOLOGY

## 2018-11-28 PROCEDURE — 77067 SCR MAMMO BI INCL CAD: CPT | Mod: 26,HCNC,, | Performed by: RADIOLOGY

## 2018-11-28 PROCEDURE — 77063 BREAST TOMOSYNTHESIS BI: CPT | Mod: TC,HCNC,PO

## 2019-02-25 ENCOUNTER — OFFICE VISIT (OUTPATIENT)
Dept: OPHTHALMOLOGY | Facility: CLINIC | Age: 84
End: 2019-02-25
Payer: MEDICARE

## 2019-02-25 DIAGNOSIS — I48.19 ATRIAL FIBRILLATION, PERSISTENT: ICD-10-CM

## 2019-02-25 DIAGNOSIS — H35.3131 EARLY DRY STAGE NONEXUDATIVE AGE-RELATED MACULAR DEGENERATION OF BOTH EYES: ICD-10-CM

## 2019-02-25 DIAGNOSIS — Z96.1 PSEUDOPHAKIA OF BOTH EYES: ICD-10-CM

## 2019-02-25 DIAGNOSIS — H40.1132 PRIMARY OPEN ANGLE GLAUCOMA OF BOTH EYES, MODERATE STAGE: Primary | ICD-10-CM

## 2019-02-25 DIAGNOSIS — H40.1132 PRIMARY OPEN ANGLE GLAUCOMA OF BOTH EYES, MODERATE STAGE: ICD-10-CM

## 2019-02-25 PROCEDURE — 92012 INTRM OPH EXAM EST PATIENT: CPT | Mod: HCNC,S$GLB,, | Performed by: OPHTHALMOLOGY

## 2019-02-25 PROCEDURE — 92133 CPTRZD OPH DX IMG PST SGM ON: CPT | Mod: HCNC,S$GLB,, | Performed by: OPHTHALMOLOGY

## 2019-02-25 PROCEDURE — 99999 PR PBB SHADOW E&M-EST. PATIENT-LVL I: CPT | Mod: PBBFAC,HCNC,, | Performed by: OPHTHALMOLOGY

## 2019-02-25 PROCEDURE — 92133 POSTERIOR SEGMENT OCT OPTIC NERVE(OCULAR COHERENCE TOMOGRAPHY) - OU - BOTH EYES: ICD-10-PCS | Mod: HCNC,S$GLB,, | Performed by: OPHTHALMOLOGY

## 2019-02-25 PROCEDURE — 99999 PR PBB SHADOW E&M-EST. PATIENT-LVL I: ICD-10-PCS | Mod: PBBFAC,HCNC,, | Performed by: OPHTHALMOLOGY

## 2019-02-25 PROCEDURE — 92012 PR EYE EXAM, EST PATIENT,INTERMED: ICD-10-PCS | Mod: HCNC,S$GLB,, | Performed by: OPHTHALMOLOGY

## 2019-02-25 RX ORDER — LATANOPROST 50 UG/ML
1 SOLUTION/ DROPS OPHTHALMIC NIGHTLY
Qty: 3 BOTTLE | Refills: 6 | Status: SHIPPED | OUTPATIENT
Start: 2019-02-25 | End: 2019-02-25 | Stop reason: SDUPTHER

## 2019-02-25 RX ORDER — ALENDRONATE SODIUM 70 MG/1
TABLET ORAL
Qty: 12 TABLET | Refills: 3 | Status: SHIPPED | OUTPATIENT
Start: 2019-02-25 | End: 2019-03-04 | Stop reason: SDUPTHER

## 2019-02-25 RX ORDER — METOPROLOL SUCCINATE 25 MG/1
25 TABLET, EXTENDED RELEASE ORAL DAILY
Qty: 90 TABLET | Refills: 3 | Status: SHIPPED | OUTPATIENT
Start: 2019-02-25 | End: 2020-01-13 | Stop reason: SDUPTHER

## 2019-02-25 RX ORDER — LATANOPROST 50 UG/ML
1 SOLUTION/ DROPS OPHTHALMIC NIGHTLY
Qty: 3 BOTTLE | Refills: 6 | Status: SHIPPED | OUTPATIENT
Start: 2019-02-25 | End: 2020-03-30

## 2019-02-25 NOTE — PROGRESS NOTES
SUBJECTIVE:   Destini Augustine is a 85 y.o. female   Uncorrected distance visual acuity was 20/25 in the right eye and 20/30 +1 in the left eye.   Chief Complaint   Patient presents with    Glaucoma     4 Months IOP CHECK & GOCT        HPI:  HPI     Glaucoma      Additional comments: 4 Months IOP CHECK & GOCT              Comments     Patient States Vision Stable & No Discomfort 100% Drop Compliance     1. Mod COAG OD>OS (Initial 30/26) goal = 17  2. PCIOL OD w/ ECP & Toric IOL 5/20/09  PCIOL OS w/ ECP & Toric IOL 4/22/09  3. AMD  4. PCO OS    Latanoprost QHS OU  Ocuvite  HAG  O3FO          Last edited by Blanka Najera on 2/25/2019  1:42 PM. (History)        Assessment /Plan :  1. Primary open angle glaucoma of both eyes, moderate stage -borderline IOP OD today but within target. Will follow   2. Early dry stage nonexudative age-related macular degeneration of both eyes -dilated 10/22/18   3. Pseudophakia of both eyes -stable        Return to clinic in 4 months  or as needed.  With IOP Check

## 2019-03-04 RX ORDER — ALENDRONATE SODIUM 70 MG/1
TABLET ORAL
Qty: 12 TABLET | Refills: 3 | Status: SHIPPED | OUTPATIENT
Start: 2019-03-04 | End: 2019-05-09 | Stop reason: SDUPTHER

## 2019-03-07 ENCOUNTER — OFFICE VISIT (OUTPATIENT)
Dept: INTERNAL MEDICINE | Facility: CLINIC | Age: 84
End: 2019-03-07
Payer: MEDICARE

## 2019-03-07 VITALS
HEART RATE: 86 BPM | TEMPERATURE: 98 F | BODY MASS INDEX: 21.08 KG/M2 | WEIGHT: 131.19 LBS | HEIGHT: 66 IN | OXYGEN SATURATION: 96 %

## 2019-03-07 DIAGNOSIS — H35.3190 NONEXUDATIVE AGE-RELATED MACULAR DEGENERATION, UNSPECIFIED LATERALITY, UNSPECIFIED STAGE: ICD-10-CM

## 2019-03-07 DIAGNOSIS — H40.1132 PRIMARY OPEN ANGLE GLAUCOMA OF BOTH EYES, MODERATE STAGE: ICD-10-CM

## 2019-03-07 DIAGNOSIS — Z00.00 ENCOUNTER FOR PREVENTIVE HEALTH EXAMINATION: Primary | ICD-10-CM

## 2019-03-07 DIAGNOSIS — I48.21 ATRIAL FIBRILLATION, PERMANENT: Chronic | ICD-10-CM

## 2019-03-07 DIAGNOSIS — Z79.01 ANTICOAGULATED: ICD-10-CM

## 2019-03-07 DIAGNOSIS — Z96.1 PSEUDOPHAKIA OF BOTH EYES: ICD-10-CM

## 2019-03-07 DIAGNOSIS — M85.89 OSTEOPENIA OF MULTIPLE SITES: ICD-10-CM

## 2019-03-07 DIAGNOSIS — I27.9 PULMONARY HEART DISEASE: ICD-10-CM

## 2019-03-07 DIAGNOSIS — I70.0 ATHEROSCLEROSIS OF AORTA: ICD-10-CM

## 2019-03-07 PROCEDURE — 99499 UNLISTED E&M SERVICE: CPT | Mod: HCNC,S$GLB,, | Performed by: PHYSICIAN ASSISTANT

## 2019-03-07 PROCEDURE — 99999 PR PBB SHADOW E&M-EST. PATIENT-LVL IV: CPT | Mod: PBBFAC,HCNC,, | Performed by: PHYSICIAN ASSISTANT

## 2019-03-07 PROCEDURE — 99499 RISK ADDL DX/OHS AUDIT: ICD-10-PCS | Mod: HCNC,S$GLB,, | Performed by: PHYSICIAN ASSISTANT

## 2019-03-07 PROCEDURE — G0439 PPPS, SUBSEQ VISIT: HCPCS | Mod: HCNC,S$GLB,, | Performed by: PHYSICIAN ASSISTANT

## 2019-03-07 PROCEDURE — G0439 PR MEDICARE ANNUAL WELLNESS SUBSEQUENT VISIT: ICD-10-PCS | Mod: HCNC,S$GLB,, | Performed by: PHYSICIAN ASSISTANT

## 2019-03-07 PROCEDURE — 99999 PR PBB SHADOW E&M-EST. PATIENT-LVL IV: ICD-10-PCS | Mod: PBBFAC,HCNC,, | Performed by: PHYSICIAN ASSISTANT

## 2019-03-07 NOTE — PROGRESS NOTES
"Destini Augustine presented for a  Medicare AWV and comprehensive Health Risk Assessment today. The following components were reviewed and updated:    · Medical history  · Family History  · Social history  · Allergies and Current Medications  · Health Risk Assessment  · Health Maintenance  · Care Team     ** See Completed Assessments for Annual Wellness Visit within the encounter summary.**       The following assessments were completed:  · Living Situation  · CAGE  · Depression Screening  · Timed Get Up and Go  · Whisper Test  · Cognitive Function Screening  · Nutrition Screening  · ADL Screening  · PAQ Screening    Vitals:    03/07/19 1401   Pulse: 86   Temp: 97.7 °F (36.5 °C)   TempSrc: Tympanic   SpO2: 96%   Weight: 59.5 kg (131 lb 2.8 oz)   Height: 5' 6" (1.676 m)     Body mass index is 21.17 kg/m².  Physical Exam   Constitutional: She is oriented to person, place, and time. She appears well-developed and well-nourished. No distress.   HENT:   Head: Normocephalic and atraumatic.   Cardiovascular: Normal rate, regular rhythm, normal heart sounds and intact distal pulses. Exam reveals no gallop and no friction rub.   No murmur heard.  Pulmonary/Chest: Effort normal and breath sounds normal. No respiratory distress. She has no wheezes. She has no rales.   Musculoskeletal: Normal range of motion.   Neurological: She is alert and oriented to person, place, and time.   Skin: Skin is warm. Capillary refill takes less than 2 seconds. No rash noted. She is not diaphoretic.   Psychiatric: She has a normal mood and affect. Her behavior is normal. Judgment and thought content normal.   Nursing note and vitals reviewed.        Diagnoses and health risks identified today and associated recommendations/orders:    1. Encounter for preventive health examination  -completed today. Patient is up to date with all her healthcare maintenance.     2. Pulmonary heart disease  --Stable and controlled. Continue current treatment plan as " previously prescribed with your cardiologist.   -2D echo 8/22/2016    3. Pseudophakia of both eyes  -Stable and controlled. Continue current treatment plan as previously prescribed with your ophthalmologist.     4. Primary open angle glaucoma of both eyes, moderate stage  -Stable and controlled. Continue current treatment plan as previously prescribed with your ophthalmologist.     5. Osteopenia of multiple sites  -DEXA 11/27/2017  -Stable and controlled on fosamax. Continue current treatment plan as previously prescribed with your PCP.     6. Nonexudative age-related macular degeneration, unspecified laterality, unspecified stage  -Stable and controlled. Continue current treatment plan as previously prescribed with your ophthalmologist.     7. Atrial fibrillation, permanent  -Stable and controlled on eliquis and metoprolol. Continue current treatment plan as previously prescribed with your PCP.     8. Atherosclerosis of aorta  -xray lumbar spine 9/21/2006    9. Anticoagulated  -apixaban  -Stable and controlled. Continue current treatment plan as previously prescribed with your cardiologist.      Provided Destini with a 5-10 year written screening schedule and personal prevention plan. Recommendations were developed using the USPSTF age appropriate recommendations. Education, counseling, and referrals were provided as needed. After Visit Summary printed and given to patient which includes a list of additional screenings\tests needed.    Follow-up if symptoms worsen or fail to improve.    Krista Leon PA-C  I offered to discuss end of life issues, including information on how to make advance directives that the patient could use to name someone who would make medical decisions on their behalf if they became too ill to make themselves.    ___Patient declined  _X_Patient is interested, I provided paper work and offered to discuss.

## 2019-03-07 NOTE — PATIENT INSTRUCTIONS
Counseling and Referral of Other Preventative  (Italic type indicates deductible and co-insurance are waived)    Patient Name: Destini Augustine  Today's Date: 3/7/2019    Health Maintenance       Date Due Completion Date    DEXA SCAN 11/27/2020 11/27/2017    Override on 7/15/2012: Done    TETANUS VACCINE 08/01/2022 8/1/2012    Lipid Panel 10/05/2022 10/5/2017        No orders of the defined types were placed in this encounter.    The following information is provided to all patients.  This information is to help you find resources for any of the problems found today that may be affecting your health:                Living healthy guide: www.[facility].[place]      Understanding Diabetes: www.diabetes.org      Eating healthy: www.cdc.gov/healthyweight      [facility] home safety checklist: www.cdc.gov/steadi/patient.html      Agency on Aging: www.goea.[place]      Alcoholics anonymous (AA): www.aa.org      Physical Activity: www.deidra.nih.gov/bc4pywr      Tobacco use: www.quitwithusla.org

## 2019-03-28 ENCOUNTER — OFFICE VISIT (OUTPATIENT)
Dept: INTERNAL MEDICINE | Facility: CLINIC | Age: 84
End: 2019-03-28
Payer: MEDICARE

## 2019-03-28 VITALS
BODY MASS INDEX: 21.11 KG/M2 | SYSTOLIC BLOOD PRESSURE: 120 MMHG | DIASTOLIC BLOOD PRESSURE: 72 MMHG | OXYGEN SATURATION: 96 % | HEART RATE: 89 BPM | WEIGHT: 131.38 LBS | HEIGHT: 66 IN | TEMPERATURE: 97 F

## 2019-03-28 DIAGNOSIS — Z00.00 ROUTINE HEALTH MAINTENANCE: Primary | ICD-10-CM

## 2019-03-28 PROCEDURE — 99397 PR PREVENTIVE VISIT,EST,65 & OVER: ICD-10-PCS | Mod: HCNC,S$GLB,, | Performed by: FAMILY MEDICINE

## 2019-03-28 PROCEDURE — 99999 PR PBB SHADOW E&M-EST. PATIENT-LVL III: ICD-10-PCS | Mod: PBBFAC,HCNC,, | Performed by: FAMILY MEDICINE

## 2019-03-28 PROCEDURE — 99999 PR PBB SHADOW E&M-EST. PATIENT-LVL III: CPT | Mod: PBBFAC,HCNC,, | Performed by: FAMILY MEDICINE

## 2019-03-28 PROCEDURE — 99499 UNLISTED E&M SERVICE: CPT | Mod: HCNC,S$GLB,, | Performed by: FAMILY MEDICINE

## 2019-03-28 PROCEDURE — 99499 RISK ADDL DX/OHS AUDIT: ICD-10-PCS | Mod: HCNC,S$GLB,, | Performed by: FAMILY MEDICINE

## 2019-03-28 PROCEDURE — 99397 PER PM REEVAL EST PAT 65+ YR: CPT | Mod: HCNC,S$GLB,, | Performed by: FAMILY MEDICINE

## 2019-03-28 NOTE — PROGRESS NOTES
Subjective:       Patient ID: Destini Augustine is afemale.    Chief Complaint: here for physical examination and issues  below    HPIafib anticoag utd card  Osteopenia  fosamax    Past Medical History:   Diagnosis Date    Arthritis     knees, hands    Atrial fibrillation     Basal cell carcinoma     face, right thigh    COAG (chronic open-angle glaucoma) - Both Eyes 8/28/2013    Ex-smoker     Hamartoma     mandible    Macular degeneration     Osteopenia     7/14 shama 7/16    Scoliosis 11/30/15    lumbar x-ray     Past Surgical History:   Procedure Laterality Date    APPENDECTOMY  1970s    CATARACT EXTRACTION Bilateral     DR. Singleton    hysterectomy  1970s    complete    HYSTERECTOMY      about 39yrs old, partial    rectocele and cystocele  1998 approx    TONSILLECTOMY, ADENOIDECTOMY  7 y/o     Family History   Problem Relation Age of Onset    Cataracts Mother     Hypertension Mother     Stroke Mother     Cancer Father      colon    Thyroid disease Father     Cancer Brother      lung    Tuberculosis Brother     Blindness Neg Hx     Diabetes Neg Hx     Macular degeneration Neg Hx     Retinal detachment Neg Hx     Strabismus Neg Hx      Social History     Social History    Marital status:      Spouse name: N/A    Number of children: 2    Years of education: N/A     Social History Main Topics    Smoking status: Former Smoker     Packs/day: 1.50     Years: 30.00     Quit date: 2/11/1980    Smokeless tobacco: Never Used    Alcohol use Yes      Comment: 2 drinks/ month    Drug use: No    Sexual activity: No     Other Topics Concern    None     Social History Narrative    None       Review of Systems  Cardiovascular: no chest pain  Chest: no shortness of breath  Abd: no abd pain  Remainder review of systems negative    Objective:      Physical Exam   Constitutional: She is oriented to person, place, and time. She appears well-developed and well-nourished. No distress.   HENT:    Head: Atraumatic.   Nose: Nose normal.   Mouth/Throat: Oropharynx is clear and moist. No oropharyngeal exudate.    r ci removed. l ac ok. Nl tms  Eyes: Conjunctivae and EOM are normal. Pupils are equal, round, and reactive to light. No scleral icterus.   Neck: Normal range of motion. Neck supple. No thyromegaly present.   Cardiovascular: Normal rate, regular rhythm and normal heart sounds.    No murmur heard.  Pulmonary/Chest: Effort normal and breath sounds normal. No respiratory distress. She has no wheezes. She has no rales.   Abdominal: Soft. Bowel sounds are normal. She exhibits no distension and no mass. There is no hepatosplenomegaly. There is no tenderness. There is no rebound and no guarding.   Musculoskeletal: Normal range of motion. She exhibits no edema or tenderness.   Lymphadenopathy:     She has no cervical adenopathy.   Neurological: She is alert and oriented to person, place, and time. No cranial nerve deficit. She exhibits normal muscle tone. Coordination normal.   Skin: Skin is warm. No rash noted. No erythema. No pallor.   Psychiatric: She has a normal mood and affect. Her behavior is normal. Judgment and thought content normal.   Nursing note and vitals reviewed.      Assessment:       1. Routine health maintenance    2. Anticoagulated        Plan:       **f/u card when due  F/u one yr  *

## 2019-04-25 ENCOUNTER — OFFICE VISIT (OUTPATIENT)
Dept: CARDIOLOGY | Facility: CLINIC | Age: 84
End: 2019-04-25
Payer: MEDICARE

## 2019-04-25 VITALS
DIASTOLIC BLOOD PRESSURE: 60 MMHG | SYSTOLIC BLOOD PRESSURE: 126 MMHG | HEIGHT: 67 IN | BODY MASS INDEX: 20.83 KG/M2 | WEIGHT: 132.69 LBS | HEART RATE: 80 BPM

## 2019-04-25 DIAGNOSIS — I48.21 ATRIAL FIBRILLATION, PERMANENT: Primary | Chronic | ICD-10-CM

## 2019-04-25 DIAGNOSIS — I70.0 ATHEROSCLEROSIS OF AORTA: ICD-10-CM

## 2019-04-25 PROCEDURE — 99999 PR PBB SHADOW E&M-EST. PATIENT-LVL III: ICD-10-PCS | Mod: PBBFAC,HCNC,, | Performed by: NUCLEAR MEDICINE

## 2019-04-25 PROCEDURE — 99499 RISK ADDL DX/OHS AUDIT: ICD-10-PCS | Mod: S$GLB,,, | Performed by: NUCLEAR MEDICINE

## 2019-04-25 PROCEDURE — 1101F PR PT FALLS ASSESS DOC 0-1 FALLS W/OUT INJ PAST YR: ICD-10-PCS | Mod: HCNC,CPTII,S$GLB, | Performed by: NUCLEAR MEDICINE

## 2019-04-25 PROCEDURE — 1101F PT FALLS ASSESS-DOCD LE1/YR: CPT | Mod: HCNC,CPTII,S$GLB, | Performed by: NUCLEAR MEDICINE

## 2019-04-25 PROCEDURE — 99214 OFFICE O/P EST MOD 30 MIN: CPT | Mod: HCNC,S$GLB,, | Performed by: NUCLEAR MEDICINE

## 2019-04-25 PROCEDURE — 99214 PR OFFICE/OUTPT VISIT, EST, LEVL IV, 30-39 MIN: ICD-10-PCS | Mod: HCNC,S$GLB,, | Performed by: NUCLEAR MEDICINE

## 2019-04-25 PROCEDURE — 99999 PR PBB SHADOW E&M-EST. PATIENT-LVL III: CPT | Mod: PBBFAC,HCNC,, | Performed by: NUCLEAR MEDICINE

## 2019-04-25 PROCEDURE — 99499 UNLISTED E&M SERVICE: CPT | Mod: S$GLB,,, | Performed by: NUCLEAR MEDICINE

## 2019-04-25 NOTE — PROGRESS NOTES
Subjective:   Patient ID:  Destini Augustine is a 86 y.o. female who presents for follow-up of Atrial Fibrillation (permanent) and atherosclerosis of aorta      HPI DOING WELL. NO RECENT HOSPITALIZATIONS OR ED VISITS FOR ADHF,  ACS OR ARRHYTHMIAS OR CVA  NO ABNORMAL BLEEDING - ON NOAC- ELIQUIS  NO FOCAL CNS SYMPTOMS OR SIGNS TO SUGGEST TIA OR STROKE  NO ANGINA. NO PLEURITIC CHEST PAIN  NO EXACERBATION OF PALPITATIONS  NO UNUSUAL JACKSON WITH ORDINARY DAILY ACTIVITIES  NO ORTHOPNEA OR PND  NO EDEMA. NO CALVE TENDERNESS  NO ABDOMINAL DISCOMFORT. NO BACK PAIN.    Review of Systems   Constitution: Negative for chills, fever, night sweats, weight gain and weight loss.   HENT: Negative for nosebleeds.    Eyes: Negative for blurred vision, double vision and visual disturbance.   Cardiovascular: Negative for chest pain, dyspnea on exertion, irregular heartbeat, leg swelling, orthopnea, palpitations, paroxysmal nocturnal dyspnea and syncope.   Respiratory: Negative for cough, hemoptysis and wheezing.    Endocrine: Negative for polydipsia and polyuria.   Hematologic/Lymphatic: Does not bruise/bleed easily.   Skin: Negative for rash.   Musculoskeletal: Negative for joint pain, joint swelling, muscle weakness and myalgias.   Gastrointestinal: Negative for abdominal pain, hematemesis, jaundice and melena.   Genitourinary: Negative for dysuria, hematuria and nocturia.   Neurological: Negative for dizziness, focal weakness, headaches, sensory change and weakness.   Psychiatric/Behavioral: Negative for depression. The patient does not have insomnia and is not nervous/anxious.      Family History   Problem Relation Age of Onset    Cataracts Mother     Hypertension Mother     Stroke Mother     Cancer Father         colon    Thyroid disease Father     Cancer Brother         lung    Tuberculosis Brother     Blindness Neg Hx     Diabetes Neg Hx     Macular degeneration Neg Hx     Retinal detachment Neg Hx     Strabismus Neg Hx       Past Medical History:   Diagnosis Date    Arthritis     knees, hands    Atrial fibrillation     Basal cell carcinoma     face, right thigh    COAG (chronic open-angle glaucoma) - Both Eyes 2013    Ex-smoker     Hamartoma     mandible    Macular degeneration     Osteopenia      shama     Scoliosis 11/30/15    lumbar x-ray     Social History     Socioeconomic History    Marital status:      Spouse name: Not on file    Number of children: 2    Years of education: Not on file    Highest education level: Not on file   Occupational History    Not on file   Social Needs    Financial resource strain: Not on file    Food insecurity:     Worry: Not on file     Inability: Not on file    Transportation needs:     Medical: Not on file     Non-medical: Not on file   Tobacco Use    Smoking status: Former Smoker     Packs/day: 1.50     Years: 30.00     Pack years: 45.00     Last attempt to quit: 1980     Years since quittin.2    Smokeless tobacco: Never Used   Substance and Sexual Activity    Alcohol use: Yes     Comment: 2 drinks/ month    Drug use: No    Sexual activity: Never     Partners: Female   Lifestyle    Physical activity:     Days per week: Not on file     Minutes per session: Not on file    Stress: Not on file   Relationships    Social connections:     Talks on phone: Not on file     Gets together: Not on file     Attends Zoroastrian service: Not on file     Active member of club or organization: Not on file     Attends meetings of clubs or organizations: Not on file     Relationship status: Not on file   Other Topics Concern    Not on file   Social History Narrative    Not on file     Current Outpatient Medications on File Prior to Visit   Medication Sig Dispense Refill    alendronate (FOSAMAX) 70 MG tablet Take 1 tablet once weekly in the morning with a full glass of water on an empty stomach. Do not lie down for at least 30 minutes afterwards. 12 tablet 3     apixaban (ELIQUIS) 2.5 mg Tab Take 1 tablet (2.5 mg total) by mouth 2 (two) times daily. 180 tablet 3    BIOTIN ORAL Take 1 tablet by mouth 2 (two) times daily.       CALCIUM CARBONATE/VITAMIN D3 (CALCIUM 600 + D,3, ORAL) Take 1 capsule by mouth 2 (two) times daily.      DOCOSAHEXANOIC ACID/EPA (FISH OIL ORAL) Take 1 capsule by mouth 2 (two) times daily.       GLUCOSAMINE HCL/CHONDRO COBB A (GLUCOSAMINE-CHONDROITIN ORAL) Take 1 tablet by mouth 2 (two) times daily.       latanoprost 0.005 % ophthalmic solution Place 1 drop into both eyes nightly. Dispense 90 days supply 3 Bottle 6    metoprolol succinate (TOPROL-XL) 25 MG 24 hr tablet Take 1 tablet (25 mg total) by mouth once daily. 90 tablet 3    multivitamin (ONE DAILY MULTIVITAMIN) per tablet Take 1 tablet by mouth once daily.      turmeric root extract 500 mg Cap Take by mouth.      vitamin A-vitamin C-vit E-min (OCUVITE) Tab Take 1 tablet by mouth Daily.       No current facility-administered medications on file prior to visit.      Review of patient's allergies indicates:   Allergen Reactions    Shellfish containing products      Other reaction(s): Nausea  Other reaction(s): Diarrhea    Venom-wasp Swelling     Other reaction(s): Itching       Objective:     Physical Exam   Constitutional: She is oriented to person, place, and time. She appears well-developed. No distress.   HENT:   Head: Normocephalic.   Eyes: Pupils are equal, round, and reactive to light. Conjunctivae are normal. No scleral icterus.   Neck: Normal range of motion. Neck supple. Normal carotid pulses, no hepatojugular reflux and no JVD present. Carotid bruit is not present. No edema present. No thyroid mass and no thyromegaly present.   Cardiovascular: Normal rate, S1 normal, S2 normal, normal heart sounds and intact distal pulses. An irregularly irregular rhythm present. PMI is not displaced. Exam reveals no gallop and no friction rub.   No murmur heard.  Pulses:       Carotid pulses  are 2+ on the right side, and 2+ on the left side.       Radial pulses are 2+ on the right side, and 2+ on the left side.        Femoral pulses are 2+ on the right side, and 2+ on the left side.       Popliteal pulses are 2+ on the right side, and 2+ on the left side.        Dorsalis pedis pulses are 2+ on the right side, and 2+ on the left side.        Posterior tibial pulses are 2+ on the right side, and 2+ on the left side.   Pulmonary/Chest: Effort normal and breath sounds normal. She has no wheezes. She has no rales. She exhibits no tenderness.   Abdominal: Soft. Bowel sounds are normal. She exhibits no pulsatile midline mass and no mass. There is no hepatosplenomegaly. There is no tenderness.   Musculoskeletal: Normal range of motion. She exhibits no edema or tenderness.        Cervical back: Normal.        Thoracic back: Normal.        Lumbar back: Normal.   Lymphadenopathy:     She has no cervical adenopathy.     She has no axillary adenopathy.        Right: No supraclavicular adenopathy present.        Left: No supraclavicular adenopathy present.   Neurological: She is alert and oriented to person, place, and time. She has normal strength and normal reflexes. No sensory deficit. Gait normal.   Skin: Skin is warm. No rash noted. No cyanosis. No pallor. Nails show no clubbing.   Psychiatric: She has a normal mood and affect. Her speech is normal and behavior is normal. Cognition and memory are normal.       Assessment:     1. Atrial fibrillation, permanent    2. Atherosclerosis of aorta        Plan:     Atrial fibrillation, permanent  AF WITH CONTROLLED VR  CNS STATUS STABLE  NO ACTIVE MYOCARDIAL ISCHEMIA  NO ABNORMAL BLEEDING - ON NOAC  NO ADHF    Atherosclerosis of aorta  NO INTERMITTENT CLAUDICATION  NO TIA OR STROKE    CONTINUE PRESENT CARD MANAGEMENT    RETURN IN 6 MONTHS.

## 2019-05-08 NOTE — TELEPHONE ENCOUNTER
----- Message from Lina Hall sent at 5/8/2019 11:39 AM CDT -----  Contact: self  Type:  Needs Medical Advice    Who Called: pt  Symptoms (please be specific):n/a   How long has patient had these symptoms:n/a  Pharmacy name and phone #:n/a  Would the patient rather a call back or a response via [facility]? Call back  Best Call Back Number: [phone]  Additional Information: requesting call back regarding pt went to get rx from pharmacy and they states it had been cancelled by provider.    Thanks,  Lina Hall

## 2019-05-09 RX ORDER — ALENDRONATE SODIUM 70 MG/1
TABLET ORAL
Qty: 12 TABLET | Refills: 3 | Status: SHIPPED | OUTPATIENT
Start: 2019-05-09 | End: 2020-04-27

## 2019-06-12 ENCOUNTER — OFFICE VISIT (OUTPATIENT)
Dept: INTERNAL MEDICINE | Facility: CLINIC | Age: 84
End: 2019-06-12
Payer: MEDICARE

## 2019-06-12 VITALS
TEMPERATURE: 98 F | DIASTOLIC BLOOD PRESSURE: 80 MMHG | WEIGHT: 131.81 LBS | BODY MASS INDEX: 20.69 KG/M2 | SYSTOLIC BLOOD PRESSURE: 132 MMHG | OXYGEN SATURATION: 94 % | HEIGHT: 67 IN | HEART RATE: 78 BPM

## 2019-06-12 DIAGNOSIS — T63.481A ALLERGIC REACTION TO INSECT STING, ACCIDENTAL OR UNINTENTIONAL, INITIAL ENCOUNTER: Primary | ICD-10-CM

## 2019-06-12 PROCEDURE — 96372 PR INJECTION,THERAP/PROPH/DIAG2ST, IM OR SUBCUT: ICD-10-PCS | Mod: HCNC,S$GLB,, | Performed by: PHYSICIAN ASSISTANT

## 2019-06-12 PROCEDURE — 99214 OFFICE O/P EST MOD 30 MIN: CPT | Mod: 25,HCNC,S$GLB, | Performed by: PHYSICIAN ASSISTANT

## 2019-06-12 PROCEDURE — 1101F PR PT FALLS ASSESS DOC 0-1 FALLS W/OUT INJ PAST YR: ICD-10-PCS | Mod: HCNC,CPTII,S$GLB, | Performed by: PHYSICIAN ASSISTANT

## 2019-06-12 PROCEDURE — 1101F PT FALLS ASSESS-DOCD LE1/YR: CPT | Mod: HCNC,CPTII,S$GLB, | Performed by: PHYSICIAN ASSISTANT

## 2019-06-12 PROCEDURE — 99999 PR PBB SHADOW E&M-EST. PATIENT-LVL IV: ICD-10-PCS | Mod: PBBFAC,HCNC,, | Performed by: PHYSICIAN ASSISTANT

## 2019-06-12 PROCEDURE — 99999 PR PBB SHADOW E&M-EST. PATIENT-LVL IV: CPT | Mod: PBBFAC,HCNC,, | Performed by: PHYSICIAN ASSISTANT

## 2019-06-12 PROCEDURE — 96372 THER/PROPH/DIAG INJ SC/IM: CPT | Mod: HCNC,S$GLB,, | Performed by: PHYSICIAN ASSISTANT

## 2019-06-12 PROCEDURE — 99214 PR OFFICE/OUTPT VISIT, EST, LEVL IV, 30-39 MIN: ICD-10-PCS | Mod: 25,HCNC,S$GLB, | Performed by: PHYSICIAN ASSISTANT

## 2019-06-12 RX ORDER — METHYLPREDNISOLONE ACETATE 40 MG/ML
60 INJECTION, SUSPENSION INTRA-ARTICULAR; INTRALESIONAL; INTRAMUSCULAR; SOFT TISSUE ONCE
Status: COMPLETED | OUTPATIENT
Start: 2019-06-12 | End: 2019-06-12

## 2019-06-12 RX ADMIN — METHYLPREDNISOLONE ACETATE 60 MG: 40 INJECTION, SUSPENSION INTRA-ARTICULAR; INTRALESIONAL; INTRAMUSCULAR; SOFT TISSUE at 11:06

## 2019-06-12 NOTE — PROGRESS NOTES
Subjective:      Patient ID: Destini Augustine is a 86 y.o. female.    Chief Complaint: Insect Bite and Swelling (Risk)    While working in yard was stung by multiple wasps on bilateral hands. Has had allergic reaction to wasps in the past.     Insect Bite   This is a new problem. The current episode started yesterday. The problem occurs constantly. The problem has been gradually worsening. Associated symptoms include joint swelling. Pertinent negatives include no abdominal pain, anorexia, arthralgias, change in bowel habit, chest pain, chills, congestion, coughing, diaphoresis, fatigue, fever, headaches, myalgias, nausea, neck pain, numbness, rash, sore throat, swollen glands, urinary symptoms, vertigo, visual change, vomiting or weakness. Treatments tried: topical anti-itch cream, baby benadryl. The treatment provided no relief.       Review of Systems   Constitutional: Negative for activity change, appetite change, chills, diaphoresis, fatigue, fever and unexpected weight change.   HENT: Negative.  Negative for congestion, hearing loss, postnasal drip, rhinorrhea, sore throat, trouble swallowing and voice change.    Eyes: Negative.  Negative for visual disturbance.   Respiratory: Negative.  Negative for cough, choking, chest tightness and shortness of breath.    Cardiovascular: Negative for chest pain, palpitations and leg swelling.   Gastrointestinal: Negative for abdominal distention, abdominal pain, anorexia, blood in stool, change in bowel habit, constipation, diarrhea, nausea and vomiting.   Endocrine: Negative for cold intolerance, heat intolerance, polydipsia and polyuria.   Genitourinary: Negative.  Negative for difficulty urinating and frequency.   Musculoskeletal: Positive for joint swelling. Negative for arthralgias, back pain, gait problem, myalgias and neck pain.   Skin: Positive for wound. Negative for color change, pallor and rash.   Neurological: Negative for dizziness, vertigo, tremors, weakness,  "light-headedness, numbness and headaches.   Hematological: Negative for adenopathy.   Psychiatric/Behavioral: Negative for behavioral problems, confusion, self-injury, sleep disturbance and suicidal ideas. The patient is not nervous/anxious.      Objective:   /80 (BP Location: Left arm, Patient Position: Sitting)   Pulse 78   Temp 98 °F (36.7 °C) (Tympanic)   Ht 5' 7" (1.702 m)   Wt 59.8 kg (131 lb 13.4 oz)   SpO2 (!) 94%   BMI 20.65 kg/m²     Physical Exam   Constitutional: She is oriented to person, place, and time. She appears well-developed and well-nourished. No distress.   Cardiovascular: Normal rate, regular rhythm and normal heart sounds. Exam reveals no gallop and no friction rub.   No murmur heard.  Pulmonary/Chest: Effort normal and breath sounds normal. No stridor. No respiratory distress. She has no wheezes.   Musculoskeletal:        Right hand: She exhibits swelling. She exhibits normal range of motion, no tenderness, no bony tenderness, normal two-point discrimination, normal capillary refill, no deformity and no laceration. Normal sensation noted. Normal strength noted.        Left hand: She exhibits swelling. She exhibits normal range of motion, no tenderness, no bony tenderness, normal two-point discrimination, normal capillary refill, no deformity and no laceration. Normal sensation noted. Normal strength noted.   Neurological: She is alert and oriented to person, place, and time.   Skin: She is not diaphoretic.   Nursing note and vitals reviewed.  -bilateral hands swollen R>L        Assessment:     1. Allergic reaction to insect sting, accidental or unintentional, initial encounter      Plan:   Allergic reaction to insect sting, accidental or unintentional, initial encounter  -     methylPREDNISolone acetate injection 60 mg    -pepcid and benadryl  -Educational handout on over-the-counter medications and at-home conservative care, pertinent to the patients diagnosis today, was handed " to the patient and discussed in detail.    Follow up if symptoms worsen or fail to improve.

## 2019-06-12 NOTE — PATIENT INSTRUCTIONS
-benadryl  -pepcid      Insect Sting Allergy, Generalized  You are having an allergic reaction to an insect sting. This may occur after a sting by a wasp, honeybee, yellow jacket, or other insect. This may cause an itchy rash and swelling in the face or other parts of the body. A more severe reaction may cause you to feel dizzy, faint, or have trouble breathing or swallowing. Other warning signs are listed below.  Symptoms can include:  · Rash, hives, redness, welts, or blisters in areas other than the sting site  · Itching, burning, stinging, pain in areas other than the sting site  · Dry, flaky, cracking, scaly skin  · Swelling in areas other than the sting site   · Stomach pain or cramps  More severe symptoms include:  · Swelling of the face or lips or drooling  · Trouble swallowing, feeling like your throat is closing  · Trouble breathing, wheezing  · Dizziness or a sudden decrease in blood pressure  · Hoarse voice or trouble speaking  · Severe nausea, vomiting, or diarrhea  · Feeling faint or lightheaded  · Rapid heart rate  Home care  Medicine  The healthcare provider may prescribe medicines to relieve swelling, itching, and pain. Follow the providers instructions when taking these medicines.  · If you had a severe reaction, the provider may prescribe an injectable epinephrine kit. Epinephrine will stop the progression of an allergic reaction. Before you leave the hospital, be sure that you understand when and how to use this medicine.  · Oral diphenhydramine is an over-the-counter antihistamine available at pharmacies and grocery stores. Unless a prescription antihistamine was given, diphenhydramine may be used to reduce itching if large areas of the skin are involved. It may make you sleepy, so be careful using it in the daytime or when going to school, working, or driving. Note: Dont use diphenhydramine if you have glaucoma or if you are a man with trouble urinating due to an enlarged prostate. There are  other antihistamines that cause less drowsiness and are good choices for daytime use. Ask your pharmacist for suggestions.  · Dont use diphenhydramine cream on your skin. It can cause a further reaction in some people.  · Calamine lotion or oatmeal baths sometimes help with itching.  · You may use acetaminophen or ibuprofen to control pain, unless another pain medicine was prescribed. Note: If you have chronic liver or kidney disease or ever had a stomach ulcer or gastrointestinal bleeding, talk with your provider before using these medicines.    General care  Avoid tight clothing and things that heat up your skin (such as hot showers or baths, or direct sunlight). Heat makes the itching worse.  An ice pack will relieve local areas of intense itching and redness. Apply 5 to 10 minutes. To make an ice pack, put ice cubes in a plastic bag that seals at the top. Wrap the bag in a clean, thin towel or cloth. Dont put ice directly on the skin.  Ticks  If you try to remove a tick, do the following:  · Use a set of fine tweezers and  the tick as close to the skin as is possible.  · Pull upwards, using even, steady pressure. Dont jerk or twist the tick. The ticks bodily fluids may contain infection-causing organisms. So dont squeeze, crush, or puncture the body of the tick. Dont use a smoldering match or cigarette, nail polish, petroleum jelly, liquid soap, or kerosene. They may irritate the tick.  · If any mouthparts of the tick remain in the skin, these can be removed with tweezers. If you cant remove the mouth (of a tick) easily with clean tweezers, leave it alone and let the skin heal.  · After the tick is removed, wash the bite area with rubbing alcohol, iodine, or soap and water.  · Put the tick in a sealed container and completely cover it with alcohol. Never try to kill or crush a tick with your hand or fingers.  Stings  Wasps, yellow jackets, and hornets dont leave a stinger behind. But if a honeybee  stings you, a stinger may stay in your skin. The stinger of a honeybee releases a substance that will attract other bees to you. So try to move away from the nest immediately. Once you are away from the nest, then remove the stinger as quickly as possible by:  · Scraping the stinger out with the edge of a dull knife or plastic card (credit card).  · Don't use a tweezer or your fingers to remove the stinger since that may squeeze more toxin from the stinger.  · Wash the affected area with soap and warm water 2 to 3 times a day. Don't break a blister, if present.  · Next apply an ice pack for 5 to 10 minutes. To make an ice pack, put ice cubes in a plastic bag that seals at the top. Wrap the bag in a clean, thin towel or cloth. Dont put ice directly on the skin.  · Contact your healthcare provider and ask what can be used to help decrease the swelling and itching to the affected area.   · To prevent an infection, don't scratch the affected areas. Always check the sting area for signs of an infection: increased redness, swelling, or pain to the affected area.  Preventing future reactions  Future reactions could be worse than this one. So try to avoid situations where you might be stung:  · Don't walk in grass without shoes. Avoid wearing sandals.  · Don't leave food uncovered when eating outside. Sweet treats, watermelon, and ice cream attract insects.  · Don't drink from uncovered sweetened drinks in cans when outside. Insects are attracted to soda drink cans and sometimes crawl inside of them.  · Don't wear bright colored clothes with flowery prints and patterns when outside.  · Dont wear perfume when outside. Smell attracts insects.  · Wear long pants, long-sleeved shirts, socks, and work gloves when working outside.  · Be aware that honeybees nest in trees. Wasps and yellow jackets nest in the ground, trees or roof eaves. Avoid garbage cans when outside.  Auto-injectable epinephrine  · If you are at high risk for  another sting due to where you work or play, or if your reaction included dizziness, fainting or trouble breathing or swallowing, an auto-injectable epinephrine may be prescribed. If not, ask your healthcare provider for one and always carry it with you. Learn how to use the device. If you begin to feel the symptoms of another reaction in the future, use the auto-injectable epinephrine to inject yourself, and then call 911. Don't wait until symptoms become severe.   · Remember that the auto-injectable epinephrine is a rescue medicine only. You still need someone to take you to the hospital or call 911 after you have received the medicine.  Follow-up care  Follow up with your healthcare provider, or as advised if your symptoms do not continue to improve.  Call 911  Call 911 if any of these occur:  · Trouble breathing or swallowing, wheezing   · Cool, moist, pale skin  · Hoarse voice or trouble speaking  · Confused  · Very drowsy or trouble waking up  · Fainting or loss of consciousness  · Rapid heart rate  · Low blood pressure or feeling dizzy or weak  · Feeling of doom  · Severe nausea, vomiting, or diarrhea  · Seizure  · Swelling in the face, eyelids, lips, mouth, throat or tongue  · Drooling  When to seek medical advice  Call your healthcare provider right away if any of the following occur:  · Spreading areas of itching, redness or swelling  · Headache, fever, chills, muscle or joint aching  · Increased pain or swelling  · Signs of infection of the affected area:  ¨ Spreading redness  ¨ Increase in pain or swelling  ¨ Fluid or colored drainage from the affected site  Date Last Reviewed: 3/1/2017  © 9872-3733 [employer]. [address] [place] [address], [place] [address]. All rights reserved. This information is not intended as a substitute for professional medical care. Always follow your healthcare professional's instructions.

## 2019-07-01 ENCOUNTER — OFFICE VISIT (OUTPATIENT)
Dept: OPHTHALMOLOGY | Facility: CLINIC | Age: 84
End: 2019-07-01
Payer: MEDICARE

## 2019-07-01 DIAGNOSIS — Z96.1 PSEUDOPHAKIA OF BOTH EYES: ICD-10-CM

## 2019-07-01 DIAGNOSIS — H40.1132 PRIMARY OPEN ANGLE GLAUCOMA OF BOTH EYES, MODERATE STAGE: Primary | ICD-10-CM

## 2019-07-01 PROCEDURE — 99999 PR PBB SHADOW E&M-EST. PATIENT-LVL II: ICD-10-PCS | Mod: PBBFAC,HCNC,, | Performed by: OPHTHALMOLOGY

## 2019-07-01 PROCEDURE — 99999 PR PBB SHADOW E&M-EST. PATIENT-LVL II: CPT | Mod: PBBFAC,HCNC,, | Performed by: OPHTHALMOLOGY

## 2019-07-01 PROCEDURE — 92012 PR EYE EXAM, EST PATIENT,INTERMED: ICD-10-PCS | Mod: HCNC,S$GLB,, | Performed by: OPHTHALMOLOGY

## 2019-07-01 PROCEDURE — 92012 INTRM OPH EXAM EST PATIENT: CPT | Mod: HCNC,S$GLB,, | Performed by: OPHTHALMOLOGY

## 2019-07-01 RX ORDER — TIMOLOL MALEATE 5 MG/ML
1 SOLUTION/ DROPS OPHTHALMIC EVERY MORNING
Qty: 5 ML | Refills: 12 | Status: SHIPPED | OUTPATIENT
Start: 2019-07-01 | End: 2019-08-27 | Stop reason: SDUPTHER

## 2019-07-01 NOTE — PROGRESS NOTES
SUBJECTIVE:   Destini Augustine is a 86 y.o. female   Uncorrected distance visual acuity was 20/40 -2 in the right eye and 20/30 -2 in the left eye.   Chief Complaint   Patient presents with    Glaucoma     4 month IOP check        HPI:  HPI     Glaucoma      Additional comments: 4 month IOP check              Comments     1. Mod COAG OD>OS (Initial 30/26) goal = 17  2. PCIOL OD w/ ECP & Toric IOL 5/20/09  PCIOL OS w/ ECP & Toric IOL 4/22/09  3. AMD  4. PCO OS    Latanoprost QHS OU  Ocuvite  O3FO            Last edited by MOLLY Jay on 7/1/2019  2:55 PM. (History)        Assessment /Plan :  1. Primary open angle glaucoma of both eyes, moderate stage   IOP OD not within acceptable range relative to target IOP with risk of irreversible visual loss. Better IOP control is recommended. Discussed options, risks, and benefits of additional medication, SLT laser, and/or incisional glaucoma surgery. Reviewed importance of continued compliance with treatment and follow up.     Patient chooses to add Timolol qam OD     Doing well, IOP OS within acceptable range relative to target IOP and no evidence of progression. Continue current treatment. Reviewed importance of continued compliance with treatment and follow up.       2. Pseudophakia of both eyes  -- Condition stable, no therapeutic change required. Monitoring routinely.       Return to clinic in 5 weeks  or as needed.  With IOP Check

## 2019-08-05 ENCOUNTER — OFFICE VISIT (OUTPATIENT)
Dept: OPHTHALMOLOGY | Facility: CLINIC | Age: 84
End: 2019-08-05
Payer: MEDICARE

## 2019-08-05 DIAGNOSIS — H40.1132 PRIMARY OPEN ANGLE GLAUCOMA OF BOTH EYES, MODERATE STAGE: Primary | ICD-10-CM

## 2019-08-05 DIAGNOSIS — Z96.1 PSEUDOPHAKIA OF BOTH EYES: ICD-10-CM

## 2019-08-05 PROCEDURE — 92012 INTRM OPH EXAM EST PATIENT: CPT | Mod: HCNC,S$GLB,, | Performed by: OPHTHALMOLOGY

## 2019-08-05 PROCEDURE — 99999 PR PBB SHADOW E&M-EST. PATIENT-LVL II: ICD-10-PCS | Mod: PBBFAC,HCNC,, | Performed by: OPHTHALMOLOGY

## 2019-08-05 PROCEDURE — 92012 PR EYE EXAM, EST PATIENT,INTERMED: ICD-10-PCS | Mod: HCNC,S$GLB,, | Performed by: OPHTHALMOLOGY

## 2019-08-05 PROCEDURE — 99999 PR PBB SHADOW E&M-EST. PATIENT-LVL II: CPT | Mod: PBBFAC,HCNC,, | Performed by: OPHTHALMOLOGY

## 2019-08-05 NOTE — PROGRESS NOTES
SUBJECTIVE:   Destini Augustine is a 86 y.o. female   Visual acuity was not recorded.   Chief Complaint   Patient presents with    Glaucoma        HPI:  HPI       Here for iop check since starting timolol qam OD      1. Mod COAG OD>OS (Initial 30/26) goal = 17  2. PCIOL OD w/ ECP & Toric IOL 5/20/09  PCIOL OS w/ ECP & Toric IOL 4/22/09  3. AMD  4. PCO OS    Latanoprost QHS OU  Timolol qam OD  Ocuvite  HAG (does not do)  O3FO    Last edited by Grace Rollins MA on 8/5/2019  2:17 PM. (History)        Assessment /Plan :  1. Primary open angle glaucoma of both eyes, moderate stage Doing well, IOP within acceptable range relative to target IOP and no evidence of progression. Continue current treatment. Reviewed importance of continued compliance with treatment and follow up.     2. Pseudophakia of both eyes  -- Condition stable, no therapeutic change required. Monitoring routinely.       Return to clinic in 3 months  or as needed.  With Dilation, HVF 24-2 and SDP

## 2019-08-27 ENCOUNTER — PATIENT MESSAGE (OUTPATIENT)
Dept: OPHTHALMOLOGY | Facility: CLINIC | Age: 84
End: 2019-08-27

## 2019-08-27 DIAGNOSIS — H40.1132 PRIMARY OPEN ANGLE GLAUCOMA OF BOTH EYES, MODERATE STAGE: ICD-10-CM

## 2019-08-27 RX ORDER — TIMOLOL MALEATE 5 MG/ML
1 SOLUTION/ DROPS OPHTHALMIC EVERY MORNING
Qty: 5 ML | Refills: 12 | Status: SHIPPED | OUTPATIENT
Start: 2019-08-27 | End: 2021-11-16 | Stop reason: SDUPTHER

## 2019-10-28 DIAGNOSIS — I48.21 PERMANENT ATRIAL FIBRILLATION: Primary | ICD-10-CM

## 2019-10-31 ENCOUNTER — CLINICAL SUPPORT (OUTPATIENT)
Dept: CARDIOLOGY | Facility: CLINIC | Age: 84
End: 2019-10-31
Payer: MEDICARE

## 2019-10-31 ENCOUNTER — OFFICE VISIT (OUTPATIENT)
Dept: CARDIOLOGY | Facility: CLINIC | Age: 84
End: 2019-10-31
Payer: MEDICARE

## 2019-10-31 VITALS
HEIGHT: 67 IN | OXYGEN SATURATION: 94 % | DIASTOLIC BLOOD PRESSURE: 64 MMHG | BODY MASS INDEX: 20.72 KG/M2 | HEART RATE: 75 BPM | WEIGHT: 132 LBS | SYSTOLIC BLOOD PRESSURE: 98 MMHG

## 2019-10-31 DIAGNOSIS — I48.21 ATRIAL FIBRILLATION, PERMANENT: Primary | Chronic | ICD-10-CM

## 2019-10-31 DIAGNOSIS — I48.21 PERMANENT ATRIAL FIBRILLATION: ICD-10-CM

## 2019-10-31 DIAGNOSIS — I70.0 ATHEROSCLEROSIS OF AORTA: ICD-10-CM

## 2019-10-31 PROCEDURE — 99999 PR PBB SHADOW E&M-EST. PATIENT-LVL III: ICD-10-PCS | Mod: PBBFAC,HCNC,, | Performed by: NUCLEAR MEDICINE

## 2019-10-31 PROCEDURE — 99214 PR OFFICE/OUTPT VISIT, EST, LEVL IV, 30-39 MIN: ICD-10-PCS | Mod: HCNC,S$GLB,, | Performed by: NUCLEAR MEDICINE

## 2019-10-31 PROCEDURE — 1101F PT FALLS ASSESS-DOCD LE1/YR: CPT | Mod: HCNC,CPTII,S$GLB, | Performed by: NUCLEAR MEDICINE

## 2019-10-31 PROCEDURE — 99214 OFFICE O/P EST MOD 30 MIN: CPT | Mod: HCNC,S$GLB,, | Performed by: NUCLEAR MEDICINE

## 2019-10-31 PROCEDURE — 93010 ELECTROCARDIOGRAM REPORT: CPT | Mod: HCNC,S$GLB,, | Performed by: INTERNAL MEDICINE

## 2019-10-31 PROCEDURE — 93005 EKG 12-LEAD: ICD-10-PCS | Mod: HCNC,S$GLB,, | Performed by: NUCLEAR MEDICINE

## 2019-10-31 PROCEDURE — 1101F PR PT FALLS ASSESS DOC 0-1 FALLS W/OUT INJ PAST YR: ICD-10-PCS | Mod: HCNC,CPTII,S$GLB, | Performed by: NUCLEAR MEDICINE

## 2019-10-31 PROCEDURE — 93005 ELECTROCARDIOGRAM TRACING: CPT | Mod: HCNC,S$GLB,, | Performed by: NUCLEAR MEDICINE

## 2019-10-31 PROCEDURE — 99999 PR PBB SHADOW E&M-EST. PATIENT-LVL III: CPT | Mod: PBBFAC,HCNC,, | Performed by: NUCLEAR MEDICINE

## 2019-10-31 PROCEDURE — 93010 EKG 12-LEAD: ICD-10-PCS | Mod: HCNC,S$GLB,, | Performed by: INTERNAL MEDICINE

## 2019-10-31 NOTE — PROGRESS NOTES
Subjective:   Patient ID:  Destini Augustine is a 86 y.o. female who presents for follow-up of Follow-up (6 mo); Atrial Fibrillation (CHRONIC, PERSISTENT); and ATHEROSCLEROSIS OF AORTA      HPI NO EXACERBATION OF PALPITATIONS. NO RECENT HOSPITALIZATIONS FOR AF WITH RVR.  NO ANGINA OR EQUIVALENT.  NO UNUSUAL JACKSON WITH ORDINARY DAILY ACTIVITIES. NO ORTHOPNEA OR PND  NO FOCAL CNS SYMPTOMS OR SIGNS TO SUGGEST TIA OR STROKE  NO ABNORMAL BLEEDING- ON NOAC/ ELIQUIS  NO EDEMA.NO CALVE TENDERNESS  NO INTERMITTENT CLAUDICATION  CARD MED GOOD COMPLIANCE  ECG TODAY- AF, 75 BPM  NO ACUTE ST T CHANGES    Review of Systems   Constitution: Negative for chills, fever, night sweats, weight gain and weight loss.   HENT: Negative for nosebleeds.    Eyes: Negative for blurred vision, double vision and visual disturbance.   Cardiovascular: Negative for chest pain, dyspnea on exertion, irregular heartbeat, leg swelling, orthopnea, palpitations, paroxysmal nocturnal dyspnea and syncope.   Respiratory: Negative for cough, hemoptysis and wheezing.    Endocrine: Negative for polydipsia and polyuria.   Hematologic/Lymphatic: Does not bruise/bleed easily.   Skin: Negative for rash.   Musculoskeletal: Negative for joint pain, joint swelling, muscle weakness and myalgias.   Gastrointestinal: Negative for abdominal pain, hematemesis, jaundice and melena.   Genitourinary: Negative for dysuria, hematuria and nocturia.   Neurological: Negative for dizziness, focal weakness, headaches, sensory change and weakness.   Psychiatric/Behavioral: Negative for depression. The patient does not have insomnia and is not nervous/anxious.      Family History   Problem Relation Age of Onset    Cataracts Mother     Hypertension Mother     Stroke Mother     Cancer Father         colon    Thyroid disease Father     Cancer Brother         lung    Tuberculosis Brother     Blindness Neg Hx     Diabetes Neg Hx     Macular degeneration Neg Hx     Retinal detachment  Neg Hx     Strabismus Neg Hx      Past Medical History:   Diagnosis Date    Arthritis     knees, hands    Atrial fibrillation     Basal cell carcinoma     face, right thigh    COAG (chronic open-angle glaucoma) - Both Eyes 2013    Ex-smoker     Hamartoma     mandible    Macular degeneration     Osteopenia      shama     Scoliosis 11/30/15    lumbar x-ray     Social History     Socioeconomic History    Marital status:      Spouse name: Not on file    Number of children: 2    Years of education: Not on file    Highest education level: Not on file   Occupational History    Not on file   Social Needs    Financial resource strain: Not on file    Food insecurity:     Worry: Not on file     Inability: Not on file    Transportation needs:     Medical: Not on file     Non-medical: Not on file   Tobacco Use    Smoking status: Former Smoker     Packs/day: 1.50     Years: 30.00     Pack years: 45.00     Last attempt to quit: 1980     Years since quittin.7    Smokeless tobacco: Never Used   Substance and Sexual Activity    Alcohol use: Yes     Comment: 2 drinks/ month    Drug use: No    Sexual activity: Never     Partners: Female   Lifestyle    Physical activity:     Days per week: Not on file     Minutes per session: Not on file    Stress: Not on file   Relationships    Social connections:     Talks on phone: Not on file     Gets together: Not on file     Attends [employer] service: Not on file     Active member of club or organization: Not on file     Attends meetings of clubs or organizations: Not on file     Relationship status: Not on file   Other Topics Concern    Not on file   Social History Narrative    Not on file     Current Outpatient Medications on File Prior to Visit   Medication Sig Dispense Refill    alendronate (FOSAMAX) 70 MG tablet Take 1 tablet once weekly in the morning with a full glass of water on an empty stomach. Do not lie down for at least 30  minutes afterwards. 12 tablet 3    apixaban (ELIQUIS) 2.5 mg Tab Take 1 tablet (2.5 mg total) by mouth 2 (two) times daily. 180 tablet 3    CALCIUM CARBONATE/VITAMIN D3 (CALCIUM 600 + D,3, ORAL) Take 1 capsule by mouth 2 (two) times daily.      DOCOSAHEXANOIC ACID/EPA (FISH OIL ORAL) Take 1 capsule by mouth 2 (two) times daily.       GLUCOSAMINE HCL/CHONDRO COBB A (GLUCOSAMINE-CHONDROITIN ORAL) Take 1 tablet by mouth 2 (two) times daily.       latanoprost 0.005 % ophthalmic solution Place 1 drop into both eyes nightly. Dispense 90 days supply 3 Bottle 6    metoprolol succinate (TOPROL-XL) 25 MG 24 hr tablet Take 1 tablet (25 mg total) by mouth once daily. 90 tablet 3    multivitamin (ONE DAILY MULTIVITAMIN) per tablet Take 1 tablet by mouth once daily.      timolol maleate 0.5% (TIMOPTIC) 0.5 % Drop Place 1 drop into the right eye every morning. 5 mL 12    turmeric root extract 500 mg Cap Take by mouth.      vitamin A-vitamin C-vit E-min (OCUVITE) Tab Take 1 tablet by mouth Daily.      BIOTIN ORAL Take 1 tablet by mouth 2 (two) times daily.        No current facility-administered medications on file prior to visit.      Review of patient's allergies indicates:   Allergen Reactions    Shellfish containing products      Other reaction(s): Nausea  Other reaction(s): Diarrhea    Venom-wasp Swelling     Other reaction(s): Itching       Objective:     Physical Exam   Constitutional: She is oriented to person, place, and time. She appears well-developed. No distress.   HENT:   Head: Normocephalic.   Eyes: Pupils are equal, round, and reactive to light. Conjunctivae are normal. No scleral icterus.   Neck: Normal range of motion. Neck supple. Normal carotid pulses, no hepatojugular reflux and no JVD present. Carotid bruit is not present. No edema present. No thyroid mass and no thyromegaly present.   Cardiovascular: Normal rate, S1 normal, S2 normal, normal heart sounds and intact distal pulses. An irregularly  irregular rhythm present. PMI is not displaced. Exam reveals no gallop and no friction rub.   No murmur heard.  Pulses:       Carotid pulses are 2+ on the right side, and 2+ on the left side.       Radial pulses are 2+ on the right side, and 2+ on the left side.        Femoral pulses are 2+ on the right side, and 2+ on the left side.       Popliteal pulses are 2+ on the right side, and 2+ on the left side.        Dorsalis pedis pulses are 2+ on the right side, and 2+ on the left side.        Posterior tibial pulses are 2+ on the right side, and 2+ on the left side.   Pulmonary/Chest: Effort normal and breath sounds normal. She has no wheezes. She has no rales. She exhibits no tenderness.   Abdominal: Soft. Bowel sounds are normal. She exhibits no pulsatile midline mass and no mass. There is no hepatosplenomegaly. There is no tenderness.   Musculoskeletal: Normal range of motion. She exhibits no edema or tenderness.        Cervical back: Normal.        Thoracic back: Normal.        Lumbar back: Normal.   Lymphadenopathy:     She has no cervical adenopathy.     She has no axillary adenopathy.        Right: No supraclavicular adenopathy present.        Left: No supraclavicular adenopathy present.   Neurological: She is alert and oriented to person, place, and time. She has normal strength and normal reflexes. No sensory deficit. Gait normal.   Skin: Skin is warm. No rash noted. No cyanosis. No pallor. Nails show no clubbing.   Psychiatric: She has a normal mood and affect. Her speech is normal and behavior is normal. Cognition and memory are normal.       Assessment:     1- CHRONIC PERMANENT AF  2- ESSENTIAL HTN  3- ATHEROSCLEROSIS OF AORTA    Plan:     1=  AF WITH CONTROLLED VR  CNS STATUS STABLE  NO ABNORMAL BLEEDING OF NOAC  NO ACTIVE MYOCARDIAL ISCHEMIA  NO ADHF    2-ESSENTIAL HTN-  WELL CONTROLLED  CARD MED WELL TOLERATED    3-ATHEROSCLEROSIS OF AORTA  ASYMPTOMATIC    CONTINUE PRESENT CARD MANAGEMENT  RETURN IN 6  MONTHS.

## 2019-11-07 ENCOUNTER — OFFICE VISIT (OUTPATIENT)
Dept: OPHTHALMOLOGY | Facility: CLINIC | Age: 84
End: 2019-11-07
Payer: MEDICARE

## 2019-11-07 DIAGNOSIS — Z96.1 PSEUDOPHAKIA OF BOTH EYES: ICD-10-CM

## 2019-11-07 DIAGNOSIS — H35.3131 EARLY DRY STAGE NONEXUDATIVE AGE-RELATED MACULAR DEGENERATION OF BOTH EYES: ICD-10-CM

## 2019-11-07 DIAGNOSIS — H40.1132 PRIMARY OPEN ANGLE GLAUCOMA OF BOTH EYES, MODERATE STAGE: Primary | ICD-10-CM

## 2019-11-07 PROCEDURE — 92014 PR EYE EXAM, EST PATIENT,COMPREHESV: ICD-10-PCS | Mod: HCNC,S$GLB,, | Performed by: OPHTHALMOLOGY

## 2019-11-07 PROCEDURE — 99999 PR PBB SHADOW E&M-EST. PATIENT-LVL II: ICD-10-PCS | Mod: PBBFAC,HCNC,, | Performed by: OPHTHALMOLOGY

## 2019-11-07 PROCEDURE — 99999 PR PBB SHADOW E&M-EST. PATIENT-LVL II: CPT | Mod: PBBFAC,HCNC,, | Performed by: OPHTHALMOLOGY

## 2019-11-07 PROCEDURE — 92083 EXTENDED VISUAL FIELD XM: CPT | Mod: HCNC,S$GLB,, | Performed by: OPHTHALMOLOGY

## 2019-11-07 PROCEDURE — 92014 COMPRE OPH EXAM EST PT 1/>: CPT | Mod: HCNC,S$GLB,, | Performed by: OPHTHALMOLOGY

## 2019-11-07 PROCEDURE — 92083 HUMPHREY VISUAL FIELD - OU - BOTH EYES: ICD-10-PCS | Mod: HCNC,S$GLB,, | Performed by: OPHTHALMOLOGY

## 2019-11-07 PROCEDURE — 92250 FUNDUS PHOTOGRAPHY W/I&R: CPT | Mod: HCNC,S$GLB,, | Performed by: OPHTHALMOLOGY

## 2019-11-07 PROCEDURE — 92250 COLOR FUNDUS PHOTOGRAPHY - OU - BOTH EYES: ICD-10-PCS | Mod: HCNC,S$GLB,, | Performed by: OPHTHALMOLOGY

## 2019-11-07 RX ORDER — MULTIVITAMIN
TABLET ORAL
COMMUNITY
Start: 2019-10-14 | End: 2020-11-23

## 2019-11-07 RX ORDER — GLUCOSAM/CHONDRO/HERB 149/HYAL 750-100 MG
TABLET ORAL
COMMUNITY
Start: 2019-08-11 | End: 2020-11-23

## 2019-11-07 NOTE — PROGRESS NOTES
SUBJECTIVE:   Destini Augustine is a 86 y.o. female   Uncorrected distance visual acuity was 20/25 in the right eye and 20/30 -2 in the left eye.   Chief Complaint   Patient presents with    Glaucoma     3 month IOP check with HVF, DFE and SDP        HPI:  HPI     Glaucoma      Additional comments: 3 month IOP check with HVF, DFE and SDP              Comments     Patient feels OU is doing well, no changes in VA and states she is happy   with OTC readers for small print.  Patient states she is using drops as   directed.      1. Mod COAG OD>OS (Initial 30/26) goal = 17  2. PCIOL OD w/ ECP & Toric IOL 5/20/09  PCIOL OS w/ ECP & Toric IOL 4/22/09  3. AMD  4. PCO OS    Latanoprost QHS OU  Timolol qam OD  Ocuvite  HAG (does not do)  O3FO          Last edited by Anita Haines, Patient Care Assistant on 11/7/2019  3:02   PM. (History)        Assessment /Plan :  1. Primary open angle glaucoma of both eyes, moderate stage Doing well, IOP within acceptable range relative to target IOP and no evidence of progression. Continue current treatment. Reviewed importance of continued compliance with treatment and follow up.     2. Pseudophakia of both eyes  -- Condition stable, no therapeutic change required. Monitoring routinely.     3. 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 Monitoring routinely.       Return to clinic in 3 months  or as needed.  With IOP Check and GOCT

## 2019-11-20 ENCOUNTER — HOSPITAL ENCOUNTER (OUTPATIENT)
Dept: RADIOLOGY | Facility: HOSPITAL | Age: 84
Discharge: HOME | End: 2019-11-20
Attending: NURSE PRACTITIONER
Payer: MEDICARE

## 2019-11-20 ENCOUNTER — OFFICE VISIT (OUTPATIENT)
Dept: INTERNAL MEDICINE | Facility: CLINIC | Age: 84
End: 2019-11-20
Payer: MEDICARE

## 2019-11-20 VITALS
DIASTOLIC BLOOD PRESSURE: 68 MMHG | RESPIRATION RATE: 16 BRPM | TEMPERATURE: 98 F | BODY MASS INDEX: 20.48 KG/M2 | HEART RATE: 84 BPM | WEIGHT: 130.5 LBS | OXYGEN SATURATION: 95 % | SYSTOLIC BLOOD PRESSURE: 116 MMHG | HEIGHT: 67 IN

## 2019-11-20 DIAGNOSIS — Z12.31 ENCOUNTER FOR SCREENING MAMMOGRAM FOR MALIGNANT NEOPLASM OF BREAST: ICD-10-CM

## 2019-11-20 DIAGNOSIS — M25.561 PAIN AND SWELLING OF RIGHT KNEE: ICD-10-CM

## 2019-11-20 DIAGNOSIS — M25.561 PAIN AND SWELLING OF RIGHT KNEE: Primary | ICD-10-CM

## 2019-11-20 DIAGNOSIS — M25.461 PAIN AND SWELLING OF RIGHT KNEE: Primary | ICD-10-CM

## 2019-11-20 DIAGNOSIS — M25.461 PAIN AND SWELLING OF RIGHT KNEE: ICD-10-CM

## 2019-11-20 DIAGNOSIS — M17.11 PRIMARY OSTEOARTHRITIS OF RIGHT KNEE: ICD-10-CM

## 2019-11-20 PROCEDURE — 99213 OFFICE O/P EST LOW 20 MIN: CPT | Mod: HCNC,S$GLB,, | Performed by: NURSE PRACTITIONER

## 2019-11-20 PROCEDURE — 73562 XR KNEE ORTHO RIGHT: ICD-10-PCS | Mod: 26,HCNC,RT, | Performed by: RADIOLOGY

## 2019-11-20 PROCEDURE — 1159F PR MEDICATION LIST DOCUMENTED IN MEDICAL RECORD: ICD-10-PCS | Mod: HCNC,S$GLB,, | Performed by: NURSE PRACTITIONER

## 2019-11-20 PROCEDURE — 73560 XR KNEE ORTHO RIGHT: ICD-10-PCS | Mod: 26,HCNC,59,LT | Performed by: RADIOLOGY

## 2019-11-20 PROCEDURE — 73560 X-RAY EXAM OF KNEE 1 OR 2: CPT | Mod: 26,HCNC,59,LT | Performed by: RADIOLOGY

## 2019-11-20 PROCEDURE — 73562 X-RAY EXAM OF KNEE 3: CPT | Mod: TC,HCNC,RT

## 2019-11-20 PROCEDURE — 73560 X-RAY EXAM OF KNEE 1 OR 2: CPT | Mod: TC,HCNC,LT

## 2019-11-20 PROCEDURE — 1126F AMNT PAIN NOTED NONE PRSNT: CPT | Mod: HCNC,S$GLB,, | Performed by: NURSE PRACTITIONER

## 2019-11-20 PROCEDURE — 1126F PR PAIN SEVERITY QUANTIFIED, NO PAIN PRESENT: ICD-10-PCS | Mod: HCNC,S$GLB,, | Performed by: NURSE PRACTITIONER

## 2019-11-20 PROCEDURE — 1159F MED LIST DOCD IN RCRD: CPT | Mod: HCNC,S$GLB,, | Performed by: NURSE PRACTITIONER

## 2019-11-20 PROCEDURE — 99999 PR PBB SHADOW E&M-EST. PATIENT-LVL V: ICD-10-PCS | Mod: PBBFAC,HCNC,, | Performed by: NURSE PRACTITIONER

## 2019-11-20 PROCEDURE — 1101F PT FALLS ASSESS-DOCD LE1/YR: CPT | Mod: HCNC,CPTII,S$GLB, | Performed by: NURSE PRACTITIONER

## 2019-11-20 PROCEDURE — 1101F PR PT FALLS ASSESS DOC 0-1 FALLS W/OUT INJ PAST YR: ICD-10-PCS | Mod: HCNC,CPTII,S$GLB, | Performed by: NURSE PRACTITIONER

## 2019-11-20 PROCEDURE — 99999 PR PBB SHADOW E&M-EST. PATIENT-LVL V: CPT | Mod: PBBFAC,HCNC,, | Performed by: NURSE PRACTITIONER

## 2019-11-20 PROCEDURE — 73562 X-RAY EXAM OF KNEE 3: CPT | Mod: 26,HCNC,RT, | Performed by: RADIOLOGY

## 2019-11-20 PROCEDURE — 99213 PR OFFICE/OUTPT VISIT, EST, LEVL III, 20-29 MIN: ICD-10-PCS | Mod: HCNC,S$GLB,, | Performed by: NURSE PRACTITIONER

## 2019-11-20 NOTE — PROGRESS NOTES
Subjective:       Patient ID: Destini Augustine is a 86 y.o. female.    Chief Complaint: Knee Pain (Rt)    Patient presents with right knee pain that started on yesterday.  Reports some discomfort prior to yesterday (intermittent).  Denies taking any medications.      Patient received a letter from [facility] about the need for a mammogram.      Review of Systems   Constitutional: Negative for chills and fatigue.   Respiratory: Negative for shortness of breath.    Cardiovascular: Negative for chest pain.   Musculoskeletal: Positive for arthralgias, gait problem and joint swelling.   Skin: Negative for color change and rash.   Psychiatric/Behavioral: Negative for agitation and confusion.       Objective:      Physical Exam   Constitutional: She is oriented to person, place, and time. Vital signs are normal. She appears well-developed and well-nourished.   HENT:   Head: Normocephalic and atraumatic.   Neck: Normal range of motion.   Cardiovascular: Normal rate.   Pulmonary/Chest: Effort normal.   Musculoskeletal: She exhibits edema and tenderness.        Right knee: She exhibits decreased range of motion and swelling.   Neurological: She is alert and oriented to person, place, and time.   Skin: Skin is warm. No erythema.   Psychiatric: She has a normal mood and affect. Her behavior is normal.       Assessment:       1. Pain and swelling of right knee    2. Encounter for screening mammogram for malignant neoplasm of breast     3. Primary osteoarthritis of right knee        Plan:         Pain and swelling of right knee  -     X-ray Knee Ortho Right; Future; Expected date: 11/20/2019  -     Ambulatory consult to Physical Therapy    Encounter for screening mammogram for malignant neoplasm of breast   -     Mammo Digital Screening Bilateral With CAD; Future; Expected date: 11/20/2019    Primary osteoarthritis of right knee  -     Ambulatory consult to Physical Therapy        X-ray today.  Recommend PT.  Ortho if no improvement.

## 2019-11-22 ENCOUNTER — CLINICAL SUPPORT (OUTPATIENT)
Dept: REHABILITATION | Facility: HOSPITAL | Age: 84
End: 2019-11-22
Payer: MEDICARE

## 2019-11-22 DIAGNOSIS — M25.561 ACUTE PAIN OF RIGHT KNEE: ICD-10-CM

## 2019-11-22 PROCEDURE — 97161 PT EVAL LOW COMPLEX 20 MIN: CPT | Mod: HCNC

## 2019-11-22 PROCEDURE — 97110 THERAPEUTIC EXERCISES: CPT | Mod: HCNC

## 2019-11-27 ENCOUNTER — TELEPHONE (OUTPATIENT)
Dept: SPORTS MEDICINE | Facility: CLINIC | Age: 84
End: 2019-11-27

## 2019-11-27 DIAGNOSIS — M17.11 PRIMARY OSTEOARTHRITIS OF RIGHT KNEE: Primary | ICD-10-CM

## 2019-11-29 ENCOUNTER — TELEPHONE (OUTPATIENT)
Dept: ORTHOPEDICS | Facility: CLINIC | Age: 84
End: 2019-11-29

## 2019-12-02 ENCOUNTER — TELEPHONE (OUTPATIENT)
Dept: SPORTS MEDICINE | Facility: CLINIC | Age: 84
End: 2019-12-02

## 2019-12-02 ENCOUNTER — CLINICAL SUPPORT (OUTPATIENT)
Dept: REHABILITATION | Facility: HOSPITAL | Age: 84
End: 2019-12-02
Payer: MEDICARE

## 2019-12-02 DIAGNOSIS — M25.561 ACUTE PAIN OF RIGHT KNEE: ICD-10-CM

## 2019-12-02 PROCEDURE — 97110 THERAPEUTIC EXERCISES: CPT | Mod: HCNC

## 2019-12-02 NOTE — TELEPHONE ENCOUNTER
Called pt in regards to ortho referral and pt states they do not want to schedule at this time. She would like to try physical therapy before scheduling apt.

## 2019-12-02 NOTE — PROGRESS NOTES
Physical Therapy Daily Treatment Note     Name: Destini Augustine  Clinic Number: [id number]    Therapy Diagnosis:   Encounter Diagnosis   Name Primary?    Acute pain of right knee      Physician: Estella Thompson NP    Visit Date: 12/2/2019    Physician Orders: PT Eval and Treat   Medical Diagnosis from Referral: M25.561,M25.461 (ICD-10-CM) - Pain and swelling of right knee M17.11 (ICD-10-CM) - Primary osteoarthritis of right knee      Evaluation Date: 11/22/2019  Authorization Period Expiration: 11/19/2020  Plan of Care Expiration: 12/27/2019  Visit # / Visits authorized: 1/12    Time In: 10:20am  Time Out: 11:17am  Total Billable Time: 30 minutes    Precautions: Standard    Subjective     Pt reports: that her knees are feeling slightly better.  She was compliant with home exercise program.  Response to previous treatment: knee feeling slightly better  Functional change: none noted    Pain: 2/10  Location: right knee      Objective   Destini received therapeutic exercises to develop strength, endurance, ROM and flexibility for 25 minutes including:  Nustep 5 min for increased ROM  Shuttle DL 4 bands 2 min  Standing heel raises 20x  Slantboard stretch 30 sec hold x 3  TKE w/GTB 20x  Squats from black box 10x  Knee flex w/SB 10 sec hold x 10  Quad sets 10 sec hold x 10  SAQ 5 sec hold x 10-NP  Bridging 5 sec hold x 20  Sidelying hip abd 20x  LAQ 5 sec hold x 20  Ham curls w/TRB 20x     Destini received the following manual therapy techniques: Joint mobilizations and Soft tissue Mobilization were applied to the: knees for 0 minutes, including:        Destini participated in neuromuscular re-education activities to improve: Balance and Posture for 0 minutes. The following activities were included:     Destini participated in gait training to improve functional mobility and safety for 5  minutes, including:  Increase knee flexion during ambulation     Destini received hot pack for 10 minutes to knees.     Destini received cold pack  for 0 minutes to knees.      Home Exercises Provided and Patient Education Provided     Education provided:   - HEP and plan of care    Written Home Exercises Provided: Patient instructed to cont prior HEP.  Exercises were reviewed and Destini was able to demonstrate them prior to the end of the session.  Destini demonstrated good  understanding of the education provided.     See EMR under Patient Instructions for exercises provided prior visit.    Assessment     Patient's R knee flexion AROM has improved.  She does continue with slight pain in the R knee which is overall better from when it started.  She needs continued work on knee and hip strengthening.   Destini is progressing well towards her goals.   Pt prognosis is Good.     Pt will continue to benefit from skilled outpatient physical therapy to address the deficits listed in the problem list box on initial evaluation, provide pt/family educatiPon and to maximize pt's level of independence in the home and community environment.     Pt's spiritual, cultural and educational needs considered and pt agreeable to plan of care and goals.     Anticipated barriers to physical therapy: none    Goals:  Long Term Goals: 4 weeks   1. Pt will be 100% independent with HEP. Progressing, not met  2. Pt will increase strength in her RLE to at least 4/5 at each joint. Progressing, not met  3. Pt will be able to walk with a normalized gait pattern. Progressing, not met  4. Pt will be able to perform SLS for at least 5 seconds on each leg to improve her balance. Progressing, not met     Plan   Plan of care Certification: 11/22/2019 to 12/27/2019.     Outpatient Physical Therapy 2 times weekly to include the following interventions: Gait Training, Manual Therapy, Moist Heat/ Ice, Neuromuscular Re-ed, Patient Education, Self Care, Therapeutic Activites and Therapeutic Exercise.       Amanda Grant, PT

## 2019-12-04 ENCOUNTER — CLINICAL SUPPORT (OUTPATIENT)
Dept: REHABILITATION | Facility: HOSPITAL | Age: 84
End: 2019-12-04
Payer: MEDICARE

## 2019-12-04 DIAGNOSIS — M25.561 ACUTE PAIN OF RIGHT KNEE: Primary | ICD-10-CM

## 2019-12-04 PROCEDURE — 97110 THERAPEUTIC EXERCISES: CPT | Mod: HCNC

## 2019-12-04 NOTE — PROGRESS NOTES
"  Physical Therapy Assistant Daily Treatment Note     Name: Destini Augustine  Clinic Number: [id number]    Therapy Diagnosis:   Encounter Diagnosis   Name Primary?    Acute pain of right knee Yes     Physician: Estella Thompson NP    Visit Date: 12/4/2019    Physician Orders: PT Eval and Treat   Medical Diagnosis from Referral: M25.561,M25.461 (ICD-10-CM) - Pain and swelling of right knee M17.11 (ICD-10-CM) - Primary osteoarthritis of right knee      Evaluation Date: 11/22/2019  Authorization Period Expiration: 11/19/2020  Plan of Care Expiration: 12/27/2019  Visit # / Visits authorized: 2/12    Time In: 1:30  Time Out: 2:30  Total Billable Time: 55 minutes    Precautions: Standard    Subjective     Pt reports: that her right knee was "really hurting this morning" but is better now.  She was compliant with home exercise program.  Response to previous treatment: no issues  Functional change: none noted    Pain: 4/10  Location: right knee      Objective   Destini received therapeutic exercises to develop strength, endurance, ROM and flexibility for 55 minutes including:  Nustep 5 min for increased ROM-unable today due to pain  Shuttle DL 4 bands 2 min-deferred today  Standing heel raises 20x  Retrosets bilat  Slantboard stretch 1 min hold x 2  TKE w/GTB 20x-not today  Squats from black box 10x-deffered today  Knee flex w/SB 3 min  Quad sets 10 sec hold x20  SAQ 5 sec hold 2 x 15-3#  Bridging 5 sec hold x 20  Sidelying hip abd 20x bilat  Sidelying clams RTB bilat  LAQ 5 sec hold x 20  Prone knee flex x 15 bilat     Destini received the following manual therapy techniques: Joint mobilizations and Soft tissue Mobilization were applied to the: knees for 0 minutes, including:        Destini participated in neuromuscular re-education activities to improve: Balance and Posture for 0 minutes. The following activities were included:     Destini participated in gait training to improve functional mobility and safety for 5  minutes, " including:  Increase knee flexion during ambulation     Destini received hot pack for 0 minutes to knees. Na today     Destini received cold pack for 0 minutes to knees.      Home Exercises Provided and Patient Education Provided     Education provided:   - HEP and plan of care    Written Home Exercises Provided: Patient instructed to cont prior HEP.  Exercises were reviewed and Destini was able to demonstrate them prior to the end of the session.  Destini demonstrated good  understanding of the education provided.     See EMR under Patient Instructions for exercises provided prior visit.    Assessment     Patient presents to therapy today with increased knee pain and ambulated with little to no knee flexion with swing to stance bilat. Exercises were modified today to tolerance. Patient was able to complete all exercises with verbal and tactile cues for proper form and alignment. She was able to ambulate with improved mass flexion bilat end of session.   Destini is progressing well towards her goals.   Pt prognosis is Good.     Pt will continue to benefit from skilled outpatient physical therapy to address the deficits listed in the problem list box on initial evaluation, provide pt/family educatiPon and to maximize pt's level of independence in the home and community environment.     Pt's spiritual, cultural and educational needs considered and pt agreeable to plan of care and goals.     Anticipated barriers to physical therapy: none    Goals:  Long Term Goals: 4 weeks   1. Pt will be 100% independent with HEP. Progressing, not met  2. Pt will increase strength in her RLE to at least 4/5 at each joint. Progressing, not met  3. Pt will be able to walk with a normalized gait pattern. Progressing, not met  4. Pt will be able to perform SLS for at least 5 seconds on each leg to improve her balance. Progressing, not met     Plan   Plan of care Certification: 11/22/2019 to 12/27/2019.     Outpatient Physical Therapy 2 times weekly  to include the following interventions: Gait Training, Manual Therapy, Moist Heat/ Ice, Neuromuscular Re-ed, Patient Education, Self Care, Therapeutic Activites and Therapeutic Exercise.       Sil Small, PTA

## 2019-12-09 ENCOUNTER — CLINICAL SUPPORT (OUTPATIENT)
Dept: REHABILITATION | Facility: HOSPITAL | Age: 84
End: 2019-12-09
Payer: MEDICARE

## 2019-12-09 DIAGNOSIS — M25.561 ACUTE PAIN OF RIGHT KNEE: ICD-10-CM

## 2019-12-09 PROCEDURE — 97110 THERAPEUTIC EXERCISES: CPT | Mod: HCNC

## 2019-12-09 NOTE — PROGRESS NOTES
Physical Therapy  Daily Treatment Note     Name: Destini Augustine  Clinic Number: [id number]    Therapy Diagnosis:   Encounter Diagnosis   Name Primary?    Acute pain of right knee      Physician: Estella Thompson NP    Visit Date: 12/9/2019    Physician Orders: PT Eval and Treat   Medical Diagnosis from Referral: M25.561,M25.461 (ICD-10-CM) - Pain and swelling of right knee M17.11 (ICD-10-CM) - Primary osteoarthritis of right knee      Evaluation Date: 11/22/2019  Authorization Period Expiration: 11/19/2020  Plan of Care Expiration: 12/27/2019  Visit # / Visits authorized: 4/12    Time In: 11:30 am  Time Out: 12:30 am  Total Billable Time: 45 minutes    Precautions: Standard    Subjective     Pt reports: no reports of knee pain today  She was compliant with home exercise program.  Response to previous treatment: good response  Functional change: less knee pain    Pain: 0/10  Location: right knee      Objective   Destini received therapeutic exercises to develop strength, endurance, ROM and flexibility for 60 minutes including:  Nustep 5 min for increased ROM-unable today due to pain  Shuttle DL 4 bands 2'  Standing heel raises 20x  Retrosets bilat  Slantboard stretch 1 min hold x 2  TKE w/GTB 20x-not today  Squats from black box 10x-deffered today  Knee flex w/SB 3 min  Quad sets 10 sec hold x20  SAQ 5 sec hold 2 x 15-3#  Bridging 5 sec hold x 20  Sidelying hip abd 20x bilat  Sidelying clams RTB bilat  LAQ 5 sec hold x 20  Prone knee flex x 15 bilat     Destini received the following manual therapy techniques: Joint mobilizations and Soft tissue Mobilization were applied to the: knees for 0 minutes, including:        Destini participated in neuromuscular re-education activities to improve: Balance and Posture for 0 minutes. The following activities were included:     Destini participated in gait training to improve functional mobility and safety for 5  minutes, including:  Increase knee flexion during ambulation     Destini  received hot pack for 0 minutes to knees. Na today     Destini received cold pack for 0 minutes to knees.      Home Exercises Provided and Patient Education Provided     Education provided:   - HEP and plan of care    Written Home Exercises Provided: Patient instructed to cont prior HEP.  Exercises were reviewed and Destini was able to demonstrate them prior to the end of the session.  Destini demonstrated good  understanding of the education provided.     See EMR under Patient Instructions for exercises provided prior visit.    Assessment     Patient presents to therapy today without knee pain. She performed all exercises without pain but did report an onset of pain in right knee after 3' on the Nu-Step. Pain diminished after activity ended. No pain with shuttle. She left the clinic pain-free.     Destini is progressing well towards her goals.   Pt prognosis is Good.     Pt will continue to benefit from skilled outpatient physical therapy to address the deficits listed in the problem list box on initial evaluation, provide pt/family educatiPon and to maximize pt's level of independence in the home and community environment.     Pt's spiritual, cultural and educational needs considered and pt agreeable to plan of care and goals.     Anticipated barriers to physical therapy: none    Goals:  Long Term Goals: 4 weeks   1. Pt will be 100% independent with HEP. Progressing, not met  2. Pt will increase strength in her RLE to at least 4/5 at each joint. Progressing, not met  3. Pt will be able to walk with a normalized gait pattern. Progressing, not met  4. Pt will be able to perform SLS for at least 5 seconds on each leg to improve her balance. Progressing, not met     Plan   Plan of care Certification: 11/22/2019 to 12/27/2019.     Outpatient Physical Therapy 2 times weekly to include the following interventions: Gait Training, Manual Therapy, Moist Heat/ Ice, Neuromuscular Re-ed, Patient Education, Self Care, Therapeutic  Activites and Therapeutic Exercise.       Babs Will, PT

## 2019-12-12 ENCOUNTER — CLINICAL SUPPORT (OUTPATIENT)
Dept: REHABILITATION | Facility: HOSPITAL | Age: 84
End: 2019-12-12
Payer: MEDICARE

## 2019-12-12 DIAGNOSIS — M25.561 ACUTE PAIN OF RIGHT KNEE: ICD-10-CM

## 2019-12-12 PROCEDURE — 97110 THERAPEUTIC EXERCISES: CPT | Mod: HCNC

## 2019-12-12 NOTE — PROGRESS NOTES
Physical Therapy  Daily Treatment Note     Name: Destini Augustine  Clinic Number: [id number]    Therapy Diagnosis:   Encounter Diagnosis   Name Primary?    Acute pain of right knee      Physician: Estella Thompson NP    Visit Date: 12/12/2019    Physician Orders: PT Eval and Treat   Medical Diagnosis from Referral: M25.561,M25.461 (ICD-10-CM) - Pain and swelling of right knee M17.11 (ICD-10-CM) - Primary osteoarthritis of right knee      Evaluation Date: 11/22/2019  Authorization Period Expiration: 11/19/2020  Plan of Care Expiration: 12/27/2019  Visit # / Visits authorized: 5/12    Time In: 10:25 am  Time Out: 11:12 am  Total Billable Time: 35 minutes    Precautions: Standard    Subjective     Pt reports: no reports of knee pain today.  She was compliant with home exercise program.  Response to previous treatment: good response  Functional change: less knee pain    Pain: 0/10  Location: right knee      Objective   Destini received therapeutic exercises to develop strength, endurance, ROM and flexibility for 47 minutes including:  Nustep 5 min for increased ROM-tolerable pain while doing it and she wanted to finish her 5 min  Shuttle DL 4 bands 2' 1 band SL  Standing heel raises 20x  Slantboard stretch 1 min hold x 2  TKE w/GTB 20x-not today  Quad sets 10 sec hold x20  SAQ 5 sec hold 2 x 15-3#  Bridging 5 sec hold x 20  Sidelying hip abd 20x bilat  Sidelying clams RTB bilat       Destini received the following manual therapy techniques: Joint mobilizations and Soft tissue Mobilization were applied to the: knees for 0 minutes, including:        Destini participated in neuromuscular re-education activities to improve: Balance and Posture for 0 minutes. The following activities were included:     Destini participated in gait training to improve functional mobility and safety for 5  minutes, including:  Increase knee flexion during ambulation     Destini received hot pack for 0 minutes to knees. Na today     Destini received cold  pack for 0 minutes to knees.    CMS Impairment/Limitation/Restriction for FOTO Knee Survey    Therapist reviewed FOTO scores for Destini Augustine on 10/28/2019.   FOTO documents entered into [employer] - see Media section.    Limitation Score: 57%  Category: Mobility    Current : CK = at least 40% but < 60% impaired, limited or restricted  Goal: CJ = at least 20% but < 40% impaired, limited or restricted           Home Exercises Provided and Patient Education Provided     Education provided:   - HEP and plan of care    Written Home Exercises Provided: Patient instructed to cont prior HEP.  Exercises were reviewed and Destini was able to demonstrate them prior to the end of the session.  Destini demonstrated good  understanding of the education provided.     See EMR under Patient Instructions for exercises provided prior visit.    Assessment     Patient demonstrated good knee flexion in her R knee = to her L knee.  She had some minor pain with a few exercises but nothing long lasting.  She still has some trouble with extending her knee after being flexed for a few minutes.  She left without pain in her knee.      Destini is progressing well towards her goals.   Pt prognosis is Good.     Pt will continue to benefit from skilled outpatient physical therapy to address the deficits listed in the problem list box on initial evaluation, provide pt/family educatiPon and to maximize pt's level of independence in the home and community environment.     Pt's spiritual, cultural and educational needs considered and pt agreeable to plan of care and goals.     Anticipated barriers to physical therapy: none    Goals:  Long Term Goals: 4 weeks   1. Pt will be 100% independent with HEP. Progressing, not met  2. Pt will increase strength in her RLE to at least 4/5 at each joint. Progressing, not met  3. Pt will be able to walk with a normalized gait pattern. Progressing, not met  4. Pt will be able to perform SLS for at least 5 seconds on each leg  to improve her balance. Progressing, not met     Plan   Plan of care Certification: 11/22/2019 to 12/27/2019.     Outpatient Physical Therapy 2 times weekly to include the following interventions: Gait Training, Manual Therapy, Moist Heat/ Ice, Neuromuscular Re-ed, Patient Education, Self Care, Therapeutic Activites and Therapeutic Exercise.       Amanda Grant, PT

## 2019-12-17 ENCOUNTER — CLINICAL SUPPORT (OUTPATIENT)
Dept: REHABILITATION | Facility: HOSPITAL | Age: 84
End: 2019-12-17
Payer: MEDICARE

## 2019-12-17 DIAGNOSIS — M25.561 ACUTE PAIN OF RIGHT KNEE: ICD-10-CM

## 2019-12-17 PROCEDURE — 97110 THERAPEUTIC EXERCISES: CPT | Mod: HCNC

## 2019-12-17 NOTE — PROGRESS NOTES
Physical Therapy  Daily Treatment Note     Name: Destini Augustine  Clinic Number: [id number]    Therapy Diagnosis:   Encounter Diagnosis   Name Primary?    Acute pain of right knee      Physician: Estella Thompson NP    Visit Date: 12/17/2019    Physician Orders: PT Eval and Treat   Medical Diagnosis from Referral: M25.561,M25.461 (ICD-10-CM) - Pain and swelling of right knee M17.11 (ICD-10-CM) - Primary osteoarthritis of right knee      Evaluation Date: 11/22/2019  Authorization Period Expiration: 11/19/2020  Plan of Care Expiration: 12/27/2019  Visit # / Visits authorized: 6/12    Time In: 10:35 am  Time Out: 11:31 am  Total Billable Time: 56 minutes    Precautions: Standard    Subjective     Pt reports: she can feel the weather change in her knees, they tend to ache more.   She was compliant with home exercise program.  Response to previous treatment: good response  Functional change: less knee pain    Pain: 0/10  Location: right knee      Objective   Destini received therapeutic exercises to develop strength, endurance, ROM and flexibility for 56 minutes including:  Bike 5 min to improve muscular endurance  Shuttle DL 4 bands 2' 1 band SL  Standing heel raises 20x  Slantboard stretch 1 min hold x 2  TKE w/GTB 20x  Quad sets 10 sec hold x20  SAQ 5 sec hold 2 x 15-3#  Bridging 5 sec hold x 20  Sidelying hip abd 20x bilat  Sidelying clams RTB bilat  Step ups (4inch step ) 20x  Matrix ham curl 10# RLE only 2 x 20  Matrix leg ext too painful  LAQ 2# 20x  SLR 2 x 10       Destini received the following manual therapy techniques: Joint mobilizations and Soft tissue Mobilization were applied to the: knees for 0 minutes, including:        Destini participated in neuromuscular re-education activities to improve: Balance and Posture for 0 minutes. The following activities were included:     Destini participated in gait training to improve functional mobility and safety for 5  minutes, including:  Increase knee flexion during  ambulation     Destini received hot pack for 0 minutes to knees. Na today     Destini received cold pack for 0 minutes to knees.    CMS Impairment/Limitation/Restriction for FOTO Knee Survey    Therapist reviewed FOTO scores for Destini Augustine on 10/28/2019.   FOTO documents entered into [employer] - see Media section.    Limitation Score: 57%  Category: Mobility    Current : CK = at least 40% but < 60% impaired, limited or restricted  Goal: CJ = at least 20% but < 40% impaired, limited or restricted           Home Exercises Provided and Patient Education Provided     Education provided:   - HEP and plan of care    Written Home Exercises Provided: Patient instructed to cont prior HEP.  Exercises were reviewed and Destini was able to demonstrate them prior to the end of the session.  Destini demonstrated good  understanding of the education provided.     See EMR under Patient Instructions for exercises provided prior visit.    Assessment     Patient has improved knee strength but still demonstrates weakness in her R knee and her hips.  She is having less pain in her R knee but overall it's not 100% better.  She reports that she is still not doing any of her normal recreational activities because she is afraid to hurt her knee again.   She would like to be discharged from therapy at her next visit.       Destini is progressing well towards her goals.   Pt prognosis is Good.     Pt will continue to benefit from skilled outpatient physical therapy to address the deficits listed in the problem list box on initial evaluation, provide pt/family educatiPon and to maximize pt's level of independence in the home and community environment.     Pt's spiritual, cultural and educational needs considered and pt agreeable to plan of care and goals.     Anticipated barriers to physical therapy: none    Goals:  Long Term Goals: 4 weeks   1. Pt will be 100% independent with HEP. Progressing, not met  2. Pt will increase strength in her RLE to at least  4/5 at each joint. Progressing, not met  3. Pt will be able to walk with a normalized gait pattern. Progressing, not met  4. Pt will be able to perform SLS for at least 5 seconds on each leg to improve her balance. Progressing, not met     Plan   Plan of care Certification: 11/22/2019 to 12/27/2019.     Outpatient Physical Therapy 2 times weekly to include the following interventions: Gait Training, Manual Therapy, Moist Heat/ Ice, Neuromuscular Re-ed, Patient Education, Self Care, Therapeutic Activites and Therapeutic Exercise.       Amanda Grant, PT

## 2019-12-19 ENCOUNTER — CLINICAL SUPPORT (OUTPATIENT)
Dept: REHABILITATION | Facility: HOSPITAL | Age: 84
End: 2019-12-19
Payer: MEDICARE

## 2019-12-19 ENCOUNTER — HOSPITAL ENCOUNTER (OUTPATIENT)
Dept: RADIOLOGY | Facility: HOSPITAL | Age: 84
Discharge: HOME | End: 2019-12-19
Attending: NURSE PRACTITIONER
Payer: MEDICARE

## 2019-12-19 VITALS — BODY MASS INDEX: 20.48 KG/M2 | HEIGHT: 67 IN | WEIGHT: 130.5 LBS

## 2019-12-19 DIAGNOSIS — M25.561 ACUTE PAIN OF RIGHT KNEE: ICD-10-CM

## 2019-12-19 DIAGNOSIS — Z12.31 ENCOUNTER FOR SCREENING MAMMOGRAM FOR MALIGNANT NEOPLASM OF BREAST: ICD-10-CM

## 2019-12-19 PROCEDURE — 77063 BREAST TOMOSYNTHESIS BI: CPT | Mod: 26,HCNC,, | Performed by: RADIOLOGY

## 2019-12-19 PROCEDURE — 77067 MAMMO DIGITAL SCREENING BILAT WITH TOMOSYNTHESIS_CAD: ICD-10-PCS | Mod: 26,HCNC,, | Performed by: RADIOLOGY

## 2019-12-19 PROCEDURE — 77067 SCR MAMMO BI INCL CAD: CPT | Mod: 26,HCNC,, | Performed by: RADIOLOGY

## 2019-12-19 PROCEDURE — 77067 SCR MAMMO BI INCL CAD: CPT | Mod: TC,HCNC

## 2019-12-19 PROCEDURE — 97110 THERAPEUTIC EXERCISES: CPT | Mod: HCNC

## 2019-12-19 PROCEDURE — 77063 MAMMO DIGITAL SCREENING BILAT WITH TOMOSYNTHESIS_CAD: ICD-10-PCS | Mod: 26,HCNC,, | Performed by: RADIOLOGY

## 2019-12-19 NOTE — PLAN OF CARE
Outpatient Therapy Discharge Summary     Name: Destini Aguustine  Clinic Number: [id number]    Therapy Diagnosis:   Encounter Diagnosis   Name Primary?    Acute pain of right knee      Physician: Estella Thompson NP        Physician Orders: PT Eval and Treat   Medical Diagnosis from Referral: M25.561,M25.461 (ICD-10-CM) - Pain and swelling of right knee M17.11 (ICD-10-CM) - Primary osteoarthritis of right knee   Evaluation Date: 12/19/2019    Authorization Period Expiration: 11/19/2020  Plan of Care Expiration: 12/27/2019  Visit # / Visits authorized: 6/12      Date of Last visit: 12/19/2019  Total Visits Received: 6  Cancelled Visits: 0  No Show Visits: 0    Assessment    Goals: Goals:  Long Term Goals: 4 weeks   1. Pt will be 100% independent with HEP. MET  2. Pt will increase strength in her RLE to at least 4/5 at each joint. MET  3. Pt will be able to walk with a normalized gait pattern. MET  4. Pt will be able to perform SLS for at least 5 seconds on RLE, Not fully met      Discharge reason: Patient requested discharge    Plan   This patient is discharged from Physical Therapy

## 2019-12-19 NOTE — PROGRESS NOTES
Physical Therapy  Daily Treatment Note     Name: Destini Augustine  Clinic Number: [id number]    Therapy Diagnosis:   Encounter Diagnosis   Name Primary?    Acute pain of right knee      Physician: Estella Thompson NP    Visit Date: 12/19/2019    Physician Orders: PT Eval and Treat   Medical Diagnosis from Referral: M25.561,M25.461 (ICD-10-CM) - Pain and swelling of right knee M17.11 (ICD-10-CM) - Primary osteoarthritis of right knee      Evaluation Date: 11/22/2019  Authorization Period Expiration: 11/19/2020  Plan of Care Expiration: 12/27/2019  Visit # / Visits authorized: 6/12    Time In: 10:30 am  Time Out: 11:25 am  Total Billable Time: 55 minutes    Precautions: Standard    Subjective     Pt reports: that her knee is feeling ok.  She reports that she feels like she can be done but she just wants to know what she can and cannot do in her exercise class.   She was compliant with home exercise program.  Response to previous treatment: good response  Functional change: less knee pain    Pain: 0/10  Location: right knee      Objective   Destini received therapeutic exercises to develop strength, endurance, ROM and flexibility for 56 minutes including:  Bike 5 min to improve muscular endurance  Shuttle DL 4 bands 2' 1 band SL  Standing heel raises 20x  Slantboard stretch 1 min hold x 2  TKE w/GTB 20x  Quad sets 10 sec hold x20  SAQ 5 sec hold 2 x 15-3#  Bridging 5 sec hold x 20  Sidelying hip abd 20x bilat  Sidelying clams RTB bilat  Step ups (4inch step ) 20x  Matrix ham curl 10# RLE only 2 x 20  Matrix leg ext too painful  LAQ 2# 20x  SLR 2 x 10       Destini received the following manual therapy techniques: Joint mobilizations and Soft tissue Mobilization were applied to the: knees for 0 minutes, including:        Destini participated in neuromuscular re-education activities to improve: Balance and Posture for 0 minutes. The following activities were included:     Destini participated in gait training to improve  functional mobility and safety for 5  minutes, including:  Increase knee flexion during ambulation     Destini received hot pack for 0 minutes to knees. Na today     Destini received cold pack for 0 minutes to knees.    CMS Impairment/Limitation/Restriction for FOTO knee Survey    Therapist reviewed FOTO scores for Destini Augustine on 10/28/2019.   FOTO documents entered into [employer] - see Media section.    Limitation Score: 57%  Category: Mobility    Current : CK = at least 40% but < 60% impaired, limited or restricted  Goal: CJ = at least 20% but < 40% impaired, limited or restricted  Discharge: CK = at least 40% but < 60% impaired, limited or restricted       Home Exercises Provided and Patient Education Provided     Education provided:   - HEP and plan of care    Written Home Exercises Provided: Patient instructed to cont prior HEP.  Exercises were reviewed and Destini was able to demonstrate them prior to the end of the session.  Destini demonstrated good  understanding of the education provided.     See EMR under Patient Instructions for exercises provided prior visit.    Assessment     Patient has improved knee strength but still demonstrates weakness in her R knee and her hips.  She is having less pain in her R knee but overall it's not 100% better.  She reports that she is still not doing any of her normal recreational activities because she is afraid to hurt her knee again.   She would like to be discharged from therapy at her next visit.       Destini is progressing well towards her goals.   Pt prognosis is Good.     Pt will continue to benefit from skilled outpatient physical therapy to address the deficits listed in the problem list box on initial evaluation, provide pt/family educatiPon and to maximize pt's level of independence in the home and community environment.     Pt's spiritual, cultural and educational needs considered and pt agreeable to plan of care and goals.     Anticipated barriers to physical therapy:  none    Goals:  Long Term Goals: 4 weeks   1. Pt will be 100% independent with HEP. Progressing, not met  2. Pt will increase strength in her RLE to at least 4/5 at each joint. Progressing, not met  3. Pt will be able to walk with a normalized gait pattern. Progressing, not met  4. Pt will be able to perform SLS for at least 5 seconds on each leg to improve her balance. Progressing, not met     Plan   Plan of care Certification: 11/22/2019 to 12/27/2019.     Outpatient Physical Therapy 2 times weekly to include the following interventions: Gait Training, Manual Therapy, Moist Heat/ Ice, Neuromuscular Re-ed, Patient Education, Self Care, Therapeutic Activites and Therapeutic Exercise.       Amanda Grant, PT

## 2020-01-13 ENCOUNTER — PATIENT MESSAGE (OUTPATIENT)
Dept: CARDIOLOGY | Facility: CLINIC | Age: 85
End: 2020-01-13

## 2020-01-13 DIAGNOSIS — I48.19 ATRIAL FIBRILLATION, PERSISTENT: ICD-10-CM

## 2020-01-13 RX ORDER — METOPROLOL SUCCINATE 25 MG/1
25 TABLET, EXTENDED RELEASE ORAL DAILY
Qty: 14 TABLET | Refills: 0 | Status: SHIPPED | OUTPATIENT
Start: 2020-01-13 | End: 2020-03-13 | Stop reason: SDUPTHER

## 2020-01-14 ENCOUNTER — PATIENT MESSAGE (OUTPATIENT)
Dept: INTERNAL MEDICINE | Facility: CLINIC | Age: 85
End: 2020-01-14

## 2020-01-23 ENCOUNTER — PATIENT MESSAGE (OUTPATIENT)
Dept: CARDIOLOGY | Facility: CLINIC | Age: 85
End: 2020-01-23

## 2020-01-23 NOTE — TELEPHONE ENCOUNTER
Patient's last office appointment and EKG was 10/31. Please advise if the patient may stop taking their eliquis 2.5 mg 5 days prior to the procedure.

## 2020-02-03 ENCOUNTER — PATIENT MESSAGE (OUTPATIENT)
Dept: INTERNAL MEDICINE | Facility: CLINIC | Age: 85
End: 2020-02-03

## 2020-02-03 DIAGNOSIS — M17.11 PRIMARY OSTEOARTHRITIS OF RIGHT KNEE: Primary | ICD-10-CM

## 2020-02-13 ENCOUNTER — OFFICE VISIT (OUTPATIENT)
Dept: OPHTHALMOLOGY | Facility: CLINIC | Age: 85
End: 2020-02-13
Payer: MEDICARE

## 2020-02-13 DIAGNOSIS — Z96.1 PSEUDOPHAKIA OF BOTH EYES: ICD-10-CM

## 2020-02-13 DIAGNOSIS — H40.1132 PRIMARY OPEN ANGLE GLAUCOMA OF BOTH EYES, MODERATE STAGE: Primary | ICD-10-CM

## 2020-02-13 PROCEDURE — 92012 PR EYE EXAM, EST PATIENT,INTERMED: ICD-10-PCS | Mod: HCNC,S$GLB,, | Performed by: OPHTHALMOLOGY

## 2020-02-13 PROCEDURE — 99999 PR PBB SHADOW E&M-EST. PATIENT-LVL II: ICD-10-PCS | Mod: PBBFAC,HCNC,, | Performed by: OPHTHALMOLOGY

## 2020-02-13 PROCEDURE — 92133 CPTRZD OPH DX IMG PST SGM ON: CPT | Mod: HCNC,S$GLB,, | Performed by: OPHTHALMOLOGY

## 2020-02-13 PROCEDURE — 92012 INTRM OPH EXAM EST PATIENT: CPT | Mod: HCNC,S$GLB,, | Performed by: OPHTHALMOLOGY

## 2020-02-13 PROCEDURE — 92133 POSTERIOR SEGMENT OCT OPTIC NERVE(OCULAR COHERENCE TOMOGRAPHY) - OU - BOTH EYES: ICD-10-PCS | Mod: HCNC,S$GLB,, | Performed by: OPHTHALMOLOGY

## 2020-02-13 PROCEDURE — 99999 PR PBB SHADOW E&M-EST. PATIENT-LVL II: CPT | Mod: PBBFAC,HCNC,, | Performed by: OPHTHALMOLOGY

## 2020-02-13 NOTE — PROGRESS NOTES
SUBJECTIVE  Destini Augustine is 86 y.o. female  Uncorrected distance visual acuity was 20/40 in the right eye and 20/40 +1 in the left eye.   Chief Complaint   Patient presents with    Glaucoma     3 month GOCT / IOP          HPI     Glaucoma      Additional comments: 3 month GOCT / IOP              Comments     Pt denies pain/ discomfort/ va change OU. States complete compliance with   drops OU.     1. Mod COAG OD>OS (Initial 30/26) goal = 17  2. PCIOL OD w/ ECP & Toric IOL 5/20/09  PCIOL OS w/ ECP & Toric IOL 4/22/09  3. Mild Dry AMD  4. PCO OS    Latanoprost QHS OU  Timolol qam OD    Ocuvite  HAG (does not do)  O3FO          Last edited by Emily Kinney on 2/13/2020  1:18 PM. (History)         Assessment /Plan :  1. Primary open angle glaucoma of both eyes, moderate stage Doing well, IOP within acceptable range relative to target IOP and no evidence of progression. Continue current treatment. Reviewed importance of continued compliance with treatment and follow up.     2. Pseudophakia of both eyes  -- Condition stable, no therapeutic change required. Monitoring routinely.       Return to clinic in 3 months  or as needed.  With IOP Check

## 2020-03-13 DIAGNOSIS — I48.19 ATRIAL FIBRILLATION, PERSISTENT: ICD-10-CM

## 2020-03-13 RX ORDER — METOPROLOL SUCCINATE 25 MG/1
25 TABLET, EXTENDED RELEASE ORAL DAILY
Qty: 90 TABLET | Refills: 3 | Status: SHIPPED | OUTPATIENT
Start: 2020-03-13 | End: 2021-05-05

## 2020-03-21 ENCOUNTER — PATIENT MESSAGE (OUTPATIENT)
Dept: AUDIOLOGY | Facility: CLINIC | Age: 85
End: 2020-03-21

## 2020-03-21 ENCOUNTER — PATIENT MESSAGE (OUTPATIENT)
Dept: INTERNAL MEDICINE | Facility: CLINIC | Age: 85
End: 2020-03-21

## 2020-03-24 ENCOUNTER — PES CALL (OUTPATIENT)
Dept: ADMINISTRATIVE | Facility: CLINIC | Age: 85
End: 2020-03-24

## 2020-03-25 ENCOUNTER — TELEPHONE (OUTPATIENT)
Dept: ADMINISTRATIVE | Facility: HOSPITAL | Age: 85
End: 2020-03-25

## 2020-03-25 NOTE — TELEPHONE ENCOUNTER
Pt is having problems with her phone number in chart there was no answer and no VM box  to leave a message on her ph or her Son (Bradley) phone for pt to return call and reschedule her 1 yr annual visit appt. due to Covid-19 this appt has been canceled. lw

## 2020-03-27 DIAGNOSIS — H40.1132 PRIMARY OPEN ANGLE GLAUCOMA OF BOTH EYES, MODERATE STAGE: ICD-10-CM

## 2020-03-30 RX ORDER — LATANOPROST 50 UG/ML
SOLUTION/ DROPS OPHTHALMIC
Qty: 3 BOTTLE | Refills: 3 | Status: SHIPPED | OUTPATIENT
Start: 2020-03-30 | End: 2021-05-31

## 2020-04-27 DIAGNOSIS — I48.19 ATRIAL FIBRILLATION, PERSISTENT: ICD-10-CM

## 2020-04-27 RX ORDER — ALENDRONATE SODIUM 70 MG/1
TABLET ORAL
Qty: 12 TABLET | Refills: 1 | Status: SHIPPED | OUTPATIENT
Start: 2020-04-27 | End: 2020-11-23 | Stop reason: SDUPTHER

## 2020-04-27 RX ORDER — APIXABAN 2.5 MG/1
TABLET, FILM COATED ORAL
Qty: 180 TABLET | Refills: 3 | Status: SHIPPED | OUTPATIENT
Start: 2020-04-27 | End: 2021-10-11

## 2020-06-03 DIAGNOSIS — I48.21 PERMANENT ATRIAL FIBRILLATION: Primary | ICD-10-CM

## 2020-06-09 ENCOUNTER — HOSPITAL ENCOUNTER (OUTPATIENT)
Dept: CARDIOLOGY | Facility: HOSPITAL | Age: 85
Discharge: HOME | End: 2020-06-09
Attending: NUCLEAR MEDICINE
Payer: MEDICARE

## 2020-06-09 ENCOUNTER — OFFICE VISIT (OUTPATIENT)
Dept: CARDIOLOGY | Facility: CLINIC | Age: 85
End: 2020-06-09
Payer: MEDICARE

## 2020-06-09 VITALS
HEART RATE: 78 BPM | HEIGHT: 67 IN | BODY MASS INDEX: 19.51 KG/M2 | OXYGEN SATURATION: 95 % | DIASTOLIC BLOOD PRESSURE: 62 MMHG | SYSTOLIC BLOOD PRESSURE: 112 MMHG | WEIGHT: 124.31 LBS

## 2020-06-09 DIAGNOSIS — I48.21 ATRIAL FIBRILLATION, PERMANENT: Primary | Chronic | ICD-10-CM

## 2020-06-09 DIAGNOSIS — I48.21 PERMANENT ATRIAL FIBRILLATION: ICD-10-CM

## 2020-06-09 DIAGNOSIS — I70.0 ATHEROSCLEROSIS OF AORTA: ICD-10-CM

## 2020-06-09 PROCEDURE — 1159F PR MEDICATION LIST DOCUMENTED IN MEDICAL RECORD: ICD-10-PCS | Mod: HCNC,S$GLB,, | Performed by: NUCLEAR MEDICINE

## 2020-06-09 PROCEDURE — 93010 ELECTROCARDIOGRAM REPORT: CPT | Mod: HCNC,,, | Performed by: INTERNAL MEDICINE

## 2020-06-09 PROCEDURE — 93005 ELECTROCARDIOGRAM TRACING: CPT | Mod: HCNC

## 2020-06-09 PROCEDURE — 1159F MED LIST DOCD IN RCRD: CPT | Mod: HCNC,S$GLB,, | Performed by: NUCLEAR MEDICINE

## 2020-06-09 PROCEDURE — 99999 PR PBB SHADOW E&M-EST. PATIENT-LVL III: CPT | Mod: PBBFAC,HCNC,, | Performed by: NUCLEAR MEDICINE

## 2020-06-09 PROCEDURE — 99499 UNLISTED E&M SERVICE: CPT | Mod: HCNC,S$GLB,, | Performed by: NUCLEAR MEDICINE

## 2020-06-09 PROCEDURE — 99214 OFFICE O/P EST MOD 30 MIN: CPT | Mod: HCNC,S$GLB,, | Performed by: NUCLEAR MEDICINE

## 2020-06-09 PROCEDURE — 99214 PR OFFICE/OUTPT VISIT, EST, LEVL IV, 30-39 MIN: ICD-10-PCS | Mod: HCNC,S$GLB,, | Performed by: NUCLEAR MEDICINE

## 2020-06-09 PROCEDURE — 99499 RISK ADDL DX/OHS AUDIT: ICD-10-PCS | Mod: HCNC,S$GLB,, | Performed by: NUCLEAR MEDICINE

## 2020-06-09 PROCEDURE — 99999 PR PBB SHADOW E&M-EST. PATIENT-LVL III: ICD-10-PCS | Mod: PBBFAC,HCNC,, | Performed by: NUCLEAR MEDICINE

## 2020-06-09 PROCEDURE — 1101F PT FALLS ASSESS-DOCD LE1/YR: CPT | Mod: HCNC,CPTII,S$GLB, | Performed by: NUCLEAR MEDICINE

## 2020-06-09 PROCEDURE — 1101F PR PT FALLS ASSESS DOC 0-1 FALLS W/OUT INJ PAST YR: ICD-10-PCS | Mod: HCNC,CPTII,S$GLB, | Performed by: NUCLEAR MEDICINE

## 2020-06-09 PROCEDURE — 93010 EKG 12-LEAD: ICD-10-PCS | Mod: HCNC,,, | Performed by: INTERNAL MEDICINE

## 2020-06-09 NOTE — PROGRESS NOTES
Subjective:   Patient ID:  Destini Augustine is a 87 y.o. female who presents for evaluation of Atrial Fibrillation (CHRONIC PERMANENT) and ATHEROSCLEROSIS OF AORTA      HPI DOING WELL. NO RECENT HOSPITALIZATIONS OR ED VISITS FOR TIA OR STROKE  NO ABNORMAL BLEEDING ON NOAC  ECG TODAY- AF,VR 78 BMP. LAD, CHRONIC ST T CHANGES  NO CHEST DISCOMFORT- PLEURITIC OR ANGINAL  NO UNUSUAL JACKSON. WITH ORDINARY DAILY ACTIVITIES  NO ORTHOPNEA OR PND  NO ABDOMINAL DISCOMFORT  NO EDEMA NO CALVE TENDERNESS  CARD MED GOOD COMPLIANCE , NO SIDE EFFECTS    Family History   Problem Relation Age of Onset    Cataracts Mother     Hypertension Mother     Stroke Mother     Cancer Father         colon    Thyroid disease Father     Cancer Brother         lung    Tuberculosis Brother     Blindness Neg Hx     Diabetes Neg Hx     Macular degeneration Neg Hx     Retinal detachment Neg Hx     Strabismus Neg Hx      Past Medical History:   Diagnosis Date    Arthritis     knees, hands    Atrial fibrillation     Basal cell carcinoma     face, right thigh    COAG (chronic open-angle glaucoma) - Both Eyes 2013    Ex-smoker     Hamartoma     mandible    Macular degeneration     Osteopenia      shama     Scoliosis 11/30/15    lumbar x-ray     Social History     Socioeconomic History    Marital status:      Spouse name: Not on file    Number of children: 2    Years of education: Not on file    Highest education level: Not on file   Occupational History    Not on file   Social Needs    Financial resource strain: Not on file    Food insecurity:     Worry: Not on file     Inability: Not on file    Transportation needs:     Medical: Not on file     Non-medical: Not on file   Tobacco Use    Smoking status: Former Smoker     Packs/day: 1.50     Years: 30.00     Pack years: 45.00     Last attempt to quit: 1980     Years since quittin.3    Smokeless tobacco: Never Used   Substance and Sexual Activity    Alcohol  use: Yes     Comment: 2 drinks/ month    Drug use: No    Sexual activity: Never     Partners: Female   Lifestyle    Physical activity:     Days per week: Not on file     Minutes per session: Not on file    Stress: Not on file   Relationships    Social connections:     Talks on phone: Not on file     Gets together: Not on file     Attends Holiness service: Not on file     Active member of club or organization: Not on file     Attends meetings of clubs or organizations: Not on file     Relationship status: Not on file   Other Topics Concern    Not on file   Social History Narrative    Lives alone, no pets or smokers in household.     Current Outpatient Medications on File Prior to Visit   Medication Sig Dispense Refill    alendronate (FOSAMAX) 70 MG tablet TAKE 1 TAB ONCE A WEEK IN THE MORNING WITH FULL GLASS OF WATER ON EMPTY STOMACH.DO NOT LIE DOWN FOR AT LEAST 30 MINS AFTER 12 tablet 1    BIOTIN ORAL Take 1 tablet by mouth 2 (two) times daily.       CALCIUM CARBONATE/VITAMIN D3 (CALCIUM 600 + D,3, ORAL) Take 1 capsule by mouth 2 (two) times daily.      DOCOSAHEXANOIC ACID/EPA (FISH OIL ORAL) Take 1 capsule by mouth 2 (two) times daily.       ELIQUIS 2.5 mg Tab TAKE 1 TABLET TWICE DAILY 180 tablet 3    GLUCOSAMINE HCL/CHONDRO COBB A (GLUCOSAMINE-CHONDROITIN ORAL) Take 1 tablet by mouth 2 (two) times daily.       latanoprost 0.005 % ophthalmic solution PLACE 1 DROP INTO BOTH EYES NIGHTLY 3 Bottle 3    metoprolol succinate (TOPROL-XL) 25 MG 24 hr tablet Take 1 tablet (25 mg total) by mouth once daily. 90 tablet 3    multivitamin (ONE DAILY MULTIVITAMIN) per tablet Take 1 tablet by mouth once daily.      multivitamin (THERAGRAN) per tablet       timolol maleate 0.5% (TIMOPTIC) 0.5 % Drop Place 1 drop into the right eye every morning. 5 mL 12    turmeric root extract 500 mg Cap Take by mouth.      vitamin A-vitamin C-vit E-min (OCUVITE) Tab Take 1 tablet by mouth Daily.      omega 3-dha-epa-fish oil  1,000 mg (120 mg-180 mg) Cap        No current facility-administered medications on file prior to visit.      Review of patient's allergies indicates:   Allergen Reactions    Shellfish containing products Diarrhea and Nausea And Vomiting    Venom-wasp Itching and Swelling       Review of Systems   Constitution: Negative for chills, fever, night sweats, weight gain and weight loss.   HENT: Negative for nosebleeds.    Eyes: Negative for blurred vision, double vision and visual disturbance.   Cardiovascular: Negative for chest pain, dyspnea on exertion, irregular heartbeat, leg swelling, orthopnea, palpitations, paroxysmal nocturnal dyspnea and syncope.   Respiratory: Negative for cough, hemoptysis and wheezing.    Endocrine: Negative for polydipsia and polyuria.   Hematologic/Lymphatic: Does not bruise/bleed easily.   Skin: Negative for rash.   Musculoskeletal: Negative for joint pain, joint swelling, muscle weakness and myalgias.   Gastrointestinal: Negative for abdominal pain, hematemesis, jaundice and melena.   Genitourinary: Negative for dysuria, hematuria and nocturia.   Neurological: Negative for dizziness, focal weakness, headaches, sensory change and weakness.   Psychiatric/Behavioral: Negative for depression. The patient does not have insomnia and is not nervous/anxious.          Objective:     Physical Exam   Constitutional: She is oriented to person, place, and time. She appears well-developed. No distress.   HENT:   Head: Normocephalic.   Eyes: Pupils are equal, round, and reactive to light. Conjunctivae are normal. No scleral icterus.   Neck: Normal range of motion. Neck supple. Normal carotid pulses, no hepatojugular reflux and no JVD present. Carotid bruit is not present. No edema present. No thyroid mass and no thyromegaly present.   Cardiovascular: Normal rate, S1 normal, S2 normal, normal heart sounds and intact distal pulses. An irregularly irregular rhythm present. PMI is not displaced. Exam  reveals no gallop and no friction rub.   No murmur heard.  Pulses:       Carotid pulses are 2+ on the right side, and 2+ on the left side.       Radial pulses are 2+ on the right side, and 2+ on the left side.        Femoral pulses are 2+ on the right side, and 2+ on the left side.       Popliteal pulses are 2+ on the right side, and 2+ on the left side.        Dorsalis pedis pulses are 2+ on the right side, and 2+ on the left side.        Posterior tibial pulses are 2+ on the right side, and 2+ on the left side.   Pulmonary/Chest: Effort normal and breath sounds normal. She has no wheezes. She has no rales. She exhibits no tenderness.   Abdominal: Soft. Bowel sounds are normal. She exhibits no pulsatile midline mass and no mass. There is no hepatosplenomegaly. There is no tenderness.   Musculoskeletal: Normal range of motion. She exhibits no edema or tenderness.        Cervical back: Normal.        Thoracic back: Normal.        Lumbar back: Normal.   Lymphadenopathy:     She has no cervical adenopathy.     She has no axillary adenopathy.        Right: No supraclavicular adenopathy present.        Left: No supraclavicular adenopathy present.   Neurological: She is alert and oriented to person, place, and time. She has normal strength and normal reflexes. No sensory deficit. Gait normal.   Skin: Skin is warm. No rash noted. No cyanosis. No pallor. Nails show no clubbing.   Psychiatric: She has a normal mood and affect. Her speech is normal and behavior is normal. Cognition and memory are normal.       Assessment:     1. Atrial fibrillation, permanent    2. Atherosclerosis of aorta        Plan:     1 - CARD ARRHYTHMIAS WELL RATE  CONTROLLED  NO CLINICAL EVIDENCE OF ACTIVE MYOCARDIAL ISCHEMIA  NO ADHF  CNS STATUS STABLE    CONTINUE PRESENT CARD MANAGEMENT  RETURN IN 6 MONTHS.

## 2020-07-08 ENCOUNTER — OFFICE VISIT (OUTPATIENT)
Dept: OPHTHALMOLOGY | Facility: CLINIC | Age: 85
End: 2020-07-08
Payer: MEDICARE

## 2020-07-08 DIAGNOSIS — H40.1132 PRIMARY OPEN ANGLE GLAUCOMA OF BOTH EYES, MODERATE STAGE: Primary | ICD-10-CM

## 2020-07-08 DIAGNOSIS — Z96.1 PSEUDOPHAKIA OF BOTH EYES: ICD-10-CM

## 2020-07-08 PROCEDURE — 99999 PR PBB SHADOW E&M-EST. PATIENT-LVL III: ICD-10-PCS | Mod: PBBFAC,HCNC,, | Performed by: OPHTHALMOLOGY

## 2020-07-08 PROCEDURE — 99499 UNLISTED E&M SERVICE: CPT | Mod: HCNC,S$GLB,, | Performed by: OPHTHALMOLOGY

## 2020-07-08 PROCEDURE — 99499 RISK ADDL DX/OHS AUDIT: ICD-10-PCS | Mod: HCNC,S$GLB,, | Performed by: OPHTHALMOLOGY

## 2020-07-08 PROCEDURE — 99999 PR PBB SHADOW E&M-EST. PATIENT-LVL III: CPT | Mod: PBBFAC,HCNC,, | Performed by: OPHTHALMOLOGY

## 2020-07-08 PROCEDURE — 92012 INTRM OPH EXAM EST PATIENT: CPT | Mod: HCNC,S$GLB,, | Performed by: OPHTHALMOLOGY

## 2020-07-08 PROCEDURE — 92012 PR EYE EXAM, EST PATIENT,INTERMED: ICD-10-PCS | Mod: HCNC,S$GLB,, | Performed by: OPHTHALMOLOGY

## 2020-07-08 NOTE — PROGRESS NOTES
SUBJECTIVE  Destini Augustine is 87 y.o. female  Uncorrected distance visual acuity was 20/40 +1 in the right eye and 20/40 -2 in the left eye.   Chief Complaint   Patient presents with    Glaucoma          HPI     3-5 months glaucoma check (IOP check)    No eye pain and no new eye changes since patient's last visit  Patient defers Mrx today.    1. Mod COAG OD>OS (Initial 30/26) goal = 17  2. PCIOL OD w/ ECP & Toric IOL 5/20/09  PCIOL OS w/ ECP & Toric IOL 4/22/09  3. Mild Dry AMD  4. PCO OS    Latanoprost QHS OU  Timolol qam OD    Ocuvite  HAG (does not do)  O3FO    Last edited by Gricel Gordon on 7/8/2020  2:20 PM. (History)         Assessment /Plan :  1. Primary open angle glaucoma of both eyes, moderate stage Doing well, IOP within acceptable range relative to target IOP and no evidence of progression. Continue current treatment. Reviewed importance of continued compliance with treatment and follow up.     2. Pseudophakia of both eyes  -- Condition stable, no therapeutic change required. Monitoring routinely.       Return to clinic in 3 to 4 months  or as needed.  With Dilation, HVF 24-2 and SDP

## 2020-08-10 ENCOUNTER — OFFICE VISIT (OUTPATIENT)
Dept: INTERNAL MEDICINE | Facility: CLINIC | Age: 85
End: 2020-08-10
Payer: MEDICARE

## 2020-08-10 VITALS
BODY MASS INDEX: 20.51 KG/M2 | DIASTOLIC BLOOD PRESSURE: 78 MMHG | SYSTOLIC BLOOD PRESSURE: 128 MMHG | HEART RATE: 81 BPM | WEIGHT: 130.94 LBS | OXYGEN SATURATION: 95 %

## 2020-08-10 DIAGNOSIS — Z78.0 ASYMPTOMATIC POSTMENOPAUSAL STATUS: ICD-10-CM

## 2020-08-10 DIAGNOSIS — Z12.31 ENCOUNTER FOR SCREENING MAMMOGRAM FOR MALIGNANT NEOPLASM OF BREAST: Primary | ICD-10-CM

## 2020-08-10 PROCEDURE — 99397 PER PM REEVAL EST PAT 65+ YR: CPT | Mod: HCNC,S$GLB,, | Performed by: FAMILY MEDICINE

## 2020-08-10 PROCEDURE — 99397 PR PREVENTIVE VISIT,EST,65 & OVER: ICD-10-PCS | Mod: HCNC,S$GLB,, | Performed by: FAMILY MEDICINE

## 2020-08-10 PROCEDURE — 99999 PR PBB SHADOW E&M-EST. PATIENT-LVL III: CPT | Mod: PBBFAC,HCNC,, | Performed by: FAMILY MEDICINE

## 2020-08-10 PROCEDURE — 99999 PR PBB SHADOW E&M-EST. PATIENT-LVL III: ICD-10-PCS | Mod: PBBFAC,HCNC,, | Performed by: FAMILY MEDICINE

## 2020-08-10 NOTE — PROGRESS NOTES
Subjective:       Patient ID: Destini Augustine is afemale.    Chief Complaint: here for physical examination and issues  below    HPIafib anticoag utd card  Osteopenia  fosamax    Past Medical History:   Diagnosis Date    Arthritis     knees, hands    Atrial fibrillation     Basal cell carcinoma     face, right thigh    COAG (chronic open-angle glaucoma) - Both Eyes 8/28/2013    Ex-smoker     Hamartoma     mandible    Macular degeneration     Osteopenia     7/14 shama 7/16    Scoliosis 11/30/15    lumbar x-ray     Past Surgical History:   Procedure Laterality Date    APPENDECTOMY  1970s    CATARACT EXTRACTION Bilateral     DR. Singleton    hysterectomy  1970s    complete    HYSTERECTOMY      about 39yrs old, partial    rectocele and cystocele  1998 approx    TONSILLECTOMY, ADENOIDECTOMY  7 y/o     Family History   Problem Relation Age of Onset    Cataracts Mother     Hypertension Mother     Stroke Mother     Cancer Father      colon    Thyroid disease Father     Cancer Brother      lung    Tuberculosis Brother     Blindness Neg Hx     Diabetes Neg Hx     Macular degeneration Neg Hx     Retinal detachment Neg Hx     Strabismus Neg Hx      Social History     Social History    Marital status:      Spouse name: N/A    Number of children: 2    Years of education: N/A     Social History Main Topics    Smoking status: Former Smoker     Packs/day: 1.50     Years: 30.00     Quit date: 2/11/1980    Smokeless tobacco: Never Used    Alcohol use Yes      Comment: 2 drinks/ month    Drug use: No    Sexual activity: No     Other Topics Concern    None     Social History Narrative    None       Review of Systems  Cardiovascular: no chest pain  Chest: no shortness of breath  Abd: no abd pain  Remainder review of systems negative    Objective:      Physical Exam   Constitutional: She is oriented to person, place, and time. She appears well-developed and well-nourished. No distress.   HENT:    Head: Atraumatic.   Nose: Nose normal.   Mouth/Throat: Oropharynx is clear and moist. No oropharyngeal exudate.    r ci removed again. l ac ok partial ci removed. Nl tms  Eyes: Conjunctivae and EOM are normal. Pupils are equal, round, and reactive to light. No scleral icterus.   Neck: Normal range of motion. Neck supple. No thyromegaly present.   Cardiovascular: Normal rate, regular rhythm and normal heart sounds.    No murmur heard.  Pulmonary/Chest: Effort normal and breath sounds normal. No respiratory distress. She has no wheezes. She has no rales.   Abdominal: Soft. Bowel sounds are normal. She exhibits no distension and no mass. There is no hepatosplenomegaly. There is no tenderness. There is no rebound and no guarding.   Musculoskeletal: Normal range of motion. She exhibits no edema or tenderness.   Lymphadenopathy:     She has no cervical adenopathy.   Neurological: She is alert and oriented to person, place, and time. No cranial nerve deficit. She exhibits normal muscle tone. Coordination normal.   Skin: Skin is warm. No rash noted. No erythema. No pallor.   Psychiatric: She has a normal mood and affect. Her behavior is normal. Judgment and thought content normal.   Nursing note and vitals reviewed.      Assessment:       1. Routine health maintenance    2. Anticoagulated      afib  osteopnia    Plan:       **f/u card when due  F/u one yr  *  Encounter for screening mammogram for malignant neoplasm of breast    Asymptomatic postmenopausal status  -     DXA Bone Density Spine And Hip; Future; Expected date: 08/10/2020

## 2020-10-01 ENCOUNTER — NURSE TRIAGE (OUTPATIENT)
Dept: ADMINISTRATIVE | Facility: CLINIC | Age: 85
End: 2020-10-01

## 2020-10-01 ENCOUNTER — OFFICE VISIT (OUTPATIENT)
Dept: URGENT CARE | Facility: CLINIC | Age: 85
End: 2020-10-01
Payer: MEDICARE

## 2020-10-01 ENCOUNTER — PATIENT MESSAGE (OUTPATIENT)
Dept: INTERNAL MEDICINE | Facility: CLINIC | Age: 85
End: 2020-10-01

## 2020-10-01 VITALS
HEART RATE: 95 BPM | DIASTOLIC BLOOD PRESSURE: 79 MMHG | TEMPERATURE: 97 F | SYSTOLIC BLOOD PRESSURE: 166 MMHG | BODY MASS INDEX: 20.72 KG/M2 | OXYGEN SATURATION: 96 % | WEIGHT: 132.25 LBS

## 2020-10-01 DIAGNOSIS — L03.115 CELLULITIS OF RIGHT LOWER EXTREMITY: Primary | ICD-10-CM

## 2020-10-01 DIAGNOSIS — M79.89 RIGHT LEG SWELLING: ICD-10-CM

## 2020-10-01 PROCEDURE — 1159F MED LIST DOCD IN RCRD: CPT | Mod: HCNC,S$GLB,, | Performed by: PHYSICIAN ASSISTANT

## 2020-10-01 PROCEDURE — 99214 OFFICE O/P EST MOD 30 MIN: CPT | Mod: HCNC,S$GLB,, | Performed by: PHYSICIAN ASSISTANT

## 2020-10-01 PROCEDURE — 1159F PR MEDICATION LIST DOCUMENTED IN MEDICAL RECORD: ICD-10-PCS | Mod: HCNC,S$GLB,, | Performed by: PHYSICIAN ASSISTANT

## 2020-10-01 PROCEDURE — 99214 PR OFFICE/OUTPT VISIT, EST, LEVL IV, 30-39 MIN: ICD-10-PCS | Mod: HCNC,S$GLB,, | Performed by: PHYSICIAN ASSISTANT

## 2020-10-01 PROCEDURE — 1126F AMNT PAIN NOTED NONE PRSNT: CPT | Mod: HCNC,S$GLB,, | Performed by: PHYSICIAN ASSISTANT

## 2020-10-01 PROCEDURE — 99999 PR PBB SHADOW E&M-EST. PATIENT-LVL V: CPT | Mod: PBBFAC,HCNC,, | Performed by: PHYSICIAN ASSISTANT

## 2020-10-01 PROCEDURE — 1126F PR PAIN SEVERITY QUANTIFIED, NO PAIN PRESENT: ICD-10-PCS | Mod: HCNC,S$GLB,, | Performed by: PHYSICIAN ASSISTANT

## 2020-10-01 PROCEDURE — 99999 PR PBB SHADOW E&M-EST. PATIENT-LVL V: ICD-10-PCS | Mod: PBBFAC,HCNC,, | Performed by: PHYSICIAN ASSISTANT

## 2020-10-01 PROCEDURE — 1101F PR PT FALLS ASSESS DOC 0-1 FALLS W/OUT INJ PAST YR: ICD-10-PCS | Mod: HCNC,CPTII,S$GLB, | Performed by: PHYSICIAN ASSISTANT

## 2020-10-01 PROCEDURE — 1101F PT FALLS ASSESS-DOCD LE1/YR: CPT | Mod: HCNC,CPTII,S$GLB, | Performed by: PHYSICIAN ASSISTANT

## 2020-10-01 RX ORDER — CEPHALEXIN 500 MG/1
500 CAPSULE ORAL 4 TIMES DAILY
Qty: 20 CAPSULE | Refills: 0 | Status: SHIPPED | OUTPATIENT
Start: 2020-10-01 | End: 2020-10-06

## 2020-10-01 NOTE — PROGRESS NOTES
Destini Augustine is an 87 year old female who presents today with complaints of right leg swelling and redness that started 2-3 days ago.  She has mild discomfort to the area.  No fevers, shortness of breath, or tachycardia.  She is currently on Eliquis.  She notes that she does have an insect bite to the right ankle from a few days ago.    All areas of patients chart reviewed including past medical history, past surgical history, medications, allergies, family history, and social history.    Review of Systems   Constitutional: Negative for fever.   HENT: Negative for sore throat.    Respiratory: Negative for shortness of breath.    Cardiovascular: Positive for leg swelling. Negative for chest pain.   Gastrointestinal: Negative for abdominal pain and nausea.   Genitourinary: Negative for dysuria and frequency.   Musculoskeletal: Negative for back pain.   Neurological: Negative for headaches.   All other systems reviewed and are negative.    Physical Exam:  BP (!) 166/79   Pulse 95   Temp 97 °F (36.1 °C) (Oral)   Wt 60 kg (132 lb 4.4 oz)   SpO2 96%   BMI 20.72 kg/m²   Physical Exam   Constitutional: She is oriented to person, place, and time and well-developed, well-nourished, and in no distress.   HENT:   Head: Normocephalic.   Right Ear: External ear normal.   Left Ear: External ear normal.   Mouth/Throat: No oropharyngeal exudate.   Neck: Normal range of motion.   Cardiovascular: Normal rate and normal heart sounds.   Pulmonary/Chest: Effort normal and breath sounds normal. No respiratory distress.   Musculoskeletal:      Right lower leg: She exhibits swelling. Edema (1+ pitting edema.  Associated cellulitis to right lower extremity) present.      Left lower leg: Edema (1+ pitting edema) present.        Legs:    Neurological: She is alert and oriented to person, place, and time.   Skin: Skin is warm.   Psychiatric: Affect normal.   Vitals reviewed.    Assessment:  1. Cellulitis of right lower extremity  -  cephALEXin (KEFLEX) 500 MG capsule; Take 1 capsule (500 mg total) by mouth 4 (four) times daily. for 5 days  Dispense: 20 capsule; Refill: 0    2. Right leg swelling    Plan:  Keflex sent to pharmacy for cellulitis of right leg.  Recommend work up in ER for an ultrasound of lower extremities to rule out DVT.  Patient verbalizes understanding but does not want to go to the ER.  I discussed the risks of not getting a further work up including death.

## 2020-10-01 NOTE — PATIENT INSTRUCTIONS
Keflex sent to pharmacy for cellulitis of right leg.  Recommend work up in ER for an ultrasound of lower extremities to rule out DVT.  Risks of not being evaluated in the ER discussed with patient, including stroke and death.

## 2020-10-01 NOTE — TELEPHONE ENCOUNTER
Pt states she thinks she may have a blood clot in her ankle. Says she is on Eliquis. Per triage protocol, advised ER now. Pt said she did not want to go to ER. Asked about UCC. RN stated the care advice says she can try an UCC now, but RN informed her they may end up sending her to ER. She VU. Pt has questions regarding locations of UCCs. RN provided.Pt had no more questions.RN advised her to call back with any or worsening of symptoms.    Reason for Disposition   Thigh or calf pain and only 1 side and present > 1 hour    Additional Information   Negative: Sounds like a life-threatening emergency to the triager   Negative: Difficulty breathing at rest   Negative: Entire foot is cool or blue in comparison to other side   Negative: SEVERE swelling (e.g., swelling extends above knee, entire leg is swollen, weeping fluid)    Protocols used: LEG SWELLING AND EDEMA-A-OH

## 2020-10-15 ENCOUNTER — PATIENT OUTREACH (OUTPATIENT)
Dept: ADMINISTRATIVE | Facility: HOSPITAL | Age: 85
End: 2020-10-15

## 2020-10-22 ENCOUNTER — OFFICE VISIT (OUTPATIENT)
Dept: OPHTHALMOLOGY | Facility: CLINIC | Age: 85
End: 2020-10-22
Payer: MEDICARE

## 2020-10-22 ENCOUNTER — PES CALL (OUTPATIENT)
Dept: ADMINISTRATIVE | Facility: CLINIC | Age: 85
End: 2020-10-22

## 2020-10-22 DIAGNOSIS — H02.401 PTOSIS OF RIGHT EYELID: ICD-10-CM

## 2020-10-22 DIAGNOSIS — H35.3131 EARLY DRY STAGE NONEXUDATIVE AGE-RELATED MACULAR DEGENERATION OF BOTH EYES: ICD-10-CM

## 2020-10-22 DIAGNOSIS — Z96.1 PSEUDOPHAKIA OF BOTH EYES: ICD-10-CM

## 2020-10-22 DIAGNOSIS — H40.1132 PRIMARY OPEN ANGLE GLAUCOMA OF BOTH EYES, MODERATE STAGE: Primary | ICD-10-CM

## 2020-10-22 PROCEDURE — 92133 POSTERIOR SEGMENT OCT OPTIC NERVE(OCULAR COHERENCE TOMOGRAPHY) - OU - BOTH EYES: ICD-10-PCS | Mod: HCNC,S$GLB,, | Performed by: OPHTHALMOLOGY

## 2020-10-22 PROCEDURE — 92014 PR EYE EXAM, EST PATIENT,COMPREHESV: ICD-10-PCS | Mod: HCNC,S$GLB,, | Performed by: OPHTHALMOLOGY

## 2020-10-22 PROCEDURE — 92133 CPTRZD OPH DX IMG PST SGM ON: CPT | Mod: HCNC,S$GLB,, | Performed by: OPHTHALMOLOGY

## 2020-10-22 PROCEDURE — 92083 EXTENDED VISUAL FIELD XM: CPT | Mod: HCNC,S$GLB,, | Performed by: OPHTHALMOLOGY

## 2020-10-22 PROCEDURE — 99999 PR PBB SHADOW E&M-EST. PATIENT-LVL III: CPT | Mod: PBBFAC,HCNC,, | Performed by: OPHTHALMOLOGY

## 2020-10-22 PROCEDURE — 99999 PR PBB SHADOW E&M-EST. PATIENT-LVL III: ICD-10-PCS | Mod: PBBFAC,HCNC,, | Performed by: OPHTHALMOLOGY

## 2020-10-22 PROCEDURE — 92083 HUMPHREY VISUAL FIELD - OU - BOTH EYES: ICD-10-PCS | Mod: HCNC,S$GLB,, | Performed by: OPHTHALMOLOGY

## 2020-10-22 PROCEDURE — 92014 COMPRE OPH EXAM EST PT 1/>: CPT | Mod: HCNC,S$GLB,, | Performed by: OPHTHALMOLOGY

## 2020-10-22 NOTE — PROGRESS NOTES
SUBJECTIVE  Destini Augustine is 87 y.o. female  Uncorrected distance visual acuity was 20/30 -2 in the right eye and 20/40 +1 in the left eye.   Chief Complaint   Patient presents with    Glaucoma     3-4 mth HVF, dilation    Eye Problem     pt states a friend noticed her OD seemed droopy           HPI     Glaucoma      Additional comments: 3-4 mth HVF, dilation              Eye Problem      Additional comments: pt states a friend noticed her OD seemed droopy               Comments     1. Mod COAG OD>OS (Initial 30/26) goal = 17  2. PCIOL OD w/ ECP & Toric IOL 5/20/09  PCIOL OS w/ ECP & Toric IOL 4/22/09  3. Mild Dry AMD  4. PCO OS    Latanoprost QHS OU  Timolol qam OD    Ocuvite  HAG (does not do)  O3FO          Last edited by Sera Rowe MA on 10/22/2020  1:04 PM. (History)         Assessment /Plan :  1. Primary open angle glaucoma of both eyes, moderate stage Doing well, IOP within acceptable range relative to target IOP and no evidence of progression. Continue current treatment. Reviewed importance of continued compliance with treatment and follow up.     2. Pseudophakia of both eyes  -- Condition stable, no therapeutic change required. Monitoring routinely.     3. 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 Monitoring routinely.     4.      Ptosis of right eyelid monitor for now    Return to clinic in 3-4 months  or as needed.  With IOP Check

## 2020-11-10 ENCOUNTER — APPOINTMENT (OUTPATIENT)
Dept: RADIOLOGY | Facility: HOSPITAL | Age: 85
End: 2020-11-10
Attending: FAMILY MEDICINE
Payer: MEDICARE

## 2020-11-10 DIAGNOSIS — Z78.0 ASYMPTOMATIC POSTMENOPAUSAL STATUS: ICD-10-CM

## 2020-11-10 PROCEDURE — 77080 DXA BONE DENSITY AXIAL: CPT | Mod: TC,HCNC

## 2020-11-10 PROCEDURE — 77080 DXA BONE DENSITY AXIAL: CPT | Mod: 26,HCNC,, | Performed by: RADIOLOGY

## 2020-11-10 PROCEDURE — 77080 DEXA BONE DENSITY SPINE HIP: ICD-10-PCS | Mod: 26,HCNC,, | Performed by: RADIOLOGY

## 2020-11-23 ENCOUNTER — OFFICE VISIT (OUTPATIENT)
Dept: HOME HEALTH SERVICES | Facility: CLINIC | Age: 85
End: 2020-11-23
Payer: MEDICARE

## 2020-11-23 VITALS
WEIGHT: 132 LBS | HEART RATE: 70 BPM | BODY MASS INDEX: 20.72 KG/M2 | SYSTOLIC BLOOD PRESSURE: 138 MMHG | TEMPERATURE: 98 F | OXYGEN SATURATION: 99 % | HEIGHT: 67 IN | DIASTOLIC BLOOD PRESSURE: 88 MMHG

## 2020-11-23 DIAGNOSIS — M85.89 OSTEOPENIA OF MULTIPLE SITES: ICD-10-CM

## 2020-11-23 DIAGNOSIS — M25.561 CHRONIC PAIN OF RIGHT KNEE: ICD-10-CM

## 2020-11-23 DIAGNOSIS — G89.29 CHRONIC PAIN OF RIGHT KNEE: ICD-10-CM

## 2020-11-23 DIAGNOSIS — I70.0 ATHEROSCLEROSIS OF AORTA: ICD-10-CM

## 2020-11-23 DIAGNOSIS — Z74.09 OTHER REDUCED MOBILITY: ICD-10-CM

## 2020-11-23 DIAGNOSIS — H35.3190 NONEXUDATIVE AGE-RELATED MACULAR DEGENERATION, UNSPECIFIED LATERALITY, UNSPECIFIED STAGE: ICD-10-CM

## 2020-11-23 DIAGNOSIS — I27.9 PULMONARY HEART DISEASE: ICD-10-CM

## 2020-11-23 DIAGNOSIS — Z96.1 PSEUDOPHAKIA OF BOTH EYES: ICD-10-CM

## 2020-11-23 DIAGNOSIS — Z79.01 ANTICOAGULATED: ICD-10-CM

## 2020-11-23 DIAGNOSIS — Z00.00 ENCOUNTER FOR PREVENTIVE HEALTH EXAMINATION: Primary | ICD-10-CM

## 2020-11-23 DIAGNOSIS — I48.21 ATRIAL FIBRILLATION, PERMANENT: Chronic | ICD-10-CM

## 2020-11-23 DIAGNOSIS — M17.11 OSTEOARTHRITIS OF RIGHT KNEE, UNSPECIFIED OSTEOARTHRITIS TYPE: ICD-10-CM

## 2020-11-23 DIAGNOSIS — H40.1132 PRIMARY OPEN ANGLE GLAUCOMA OF BOTH EYES, MODERATE STAGE: ICD-10-CM

## 2020-11-23 PROCEDURE — 1158F ADVNC CARE PLAN TLK DOCD: CPT | Mod: S$GLB,,, | Performed by: NURSE PRACTITIONER

## 2020-11-23 PROCEDURE — G0439 PPPS, SUBSEQ VISIT: HCPCS | Mod: S$GLB,,, | Performed by: NURSE PRACTITIONER

## 2020-11-23 PROCEDURE — 1126F PR PAIN SEVERITY QUANTIFIED, NO PAIN PRESENT: ICD-10-PCS | Mod: S$GLB,,, | Performed by: NURSE PRACTITIONER

## 2020-11-23 PROCEDURE — G0439 PR MEDICARE ANNUAL WELLNESS SUBSEQUENT VISIT: ICD-10-PCS | Mod: S$GLB,,, | Performed by: NURSE PRACTITIONER

## 2020-11-23 PROCEDURE — 1101F PR PT FALLS ASSESS DOC 0-1 FALLS W/OUT INJ PAST YR: ICD-10-PCS | Mod: CPTII,S$GLB,, | Performed by: NURSE PRACTITIONER

## 2020-11-23 PROCEDURE — 1158F PR ADVANCE CARE PLANNING DISCUSS DOCUMENTED IN MEDICAL RECORD: ICD-10-PCS | Mod: S$GLB,,, | Performed by: NURSE PRACTITIONER

## 2020-11-23 PROCEDURE — 3288F FALL RISK ASSESSMENT DOCD: CPT | Mod: CPTII,S$GLB,, | Performed by: NURSE PRACTITIONER

## 2020-11-23 PROCEDURE — 1101F PT FALLS ASSESS-DOCD LE1/YR: CPT | Mod: CPTII,S$GLB,, | Performed by: NURSE PRACTITIONER

## 2020-11-23 PROCEDURE — 3288F PR FALLS RISK ASSESSMENT DOCUMENTED: ICD-10-PCS | Mod: CPTII,S$GLB,, | Performed by: NURSE PRACTITIONER

## 2020-11-23 PROCEDURE — 1126F AMNT PAIN NOTED NONE PRSNT: CPT | Mod: S$GLB,,, | Performed by: NURSE PRACTITIONER

## 2020-11-23 RX ORDER — ALENDRONATE SODIUM 70 MG/1
TABLET ORAL
Qty: 12 TABLET | Refills: 0 | Status: SHIPPED | OUTPATIENT
Start: 2020-11-23 | End: 2020-11-23

## 2020-11-23 RX ORDER — ALENDRONATE SODIUM 70 MG/1
TABLET ORAL
Qty: 12 TABLET | Refills: 0 | Status: SHIPPED | OUTPATIENT
Start: 2020-11-23 | End: 2020-11-23 | Stop reason: CLARIF

## 2020-11-23 NOTE — PROGRESS NOTES
"Destini Augustine presented for Medicare AWV today. The following components were reviewed and updated:    · Medical history  · Family History  · Social history  · Allergies and Current Medications  · Health Risk Assessment  · Health Maintenance  · Care Team    **See Completed Assessments for Annual Wellness visit with in the encounter summary    The following assessments were completed:  · Depression Screening  · Cognitive function Screening      · Timed Get Up Test  · Whisper Test    Vitals:    11/23/20 1216   BP: 138/88   Pulse: 70   Temp: 97.7 °F (36.5 °C)   TempSrc: Temporal   SpO2: 99%   Weight: 59.9 kg (132 lb)   Height: 5' 7" (1.702 m)     Body mass index is 20.67 kg/m².   ]    Physical Exam  Vitals signs reviewed.   Constitutional:       Appearance: Normal appearance.   HENT:      Head: Normocephalic and atraumatic.   Neck:      Musculoskeletal: Normal range of motion and neck supple.   Cardiovascular:      Rate and Rhythm: Normal rate. Rhythm irregular.      Pulses: Normal pulses.      Heart sounds: Normal heart sounds.   Pulmonary:      Effort: Pulmonary effort is normal.      Breath sounds: Normal breath sounds.   Musculoskeletal: Normal range of motion.   Skin:     General: Skin is warm and dry.   Neurological:      Mental Status: She is alert and oriented to person, place, and time.      Gait: Gait abnormal.   Psychiatric:         Mood and Affect: Mood normal.         Behavior: Behavior normal.          Outpatient Medications Marked as Taking for the 11/23/20 encounter (Office Visit) with ALLISON Borges   Medication Sig Dispense Refill    BIOTIN ORAL Take 1 tablet by mouth 2 (two) times daily. Once daily per patient      CALCIUM CARBONATE/VITAMIN D3 (CALCIUM 600 + D,3, ORAL) Take 1 capsule by mouth 2 (two) times daily.      DOCOSAHEXANOIC ACID/EPA (FISH OIL ORAL) Take 1 capsule by mouth 2 (two) times daily.       ELIQUIS 2.5 mg Tab TAKE 1 TABLET TWICE DAILY 180 tablet 3    GLUCOSAMINE " HCL/CHONDRO OCBB A (GLUCOSAMINE-CHONDROITIN ORAL) Take 1 tablet by mouth 2 (two) times daily.       latanoprost 0.005 % ophthalmic solution PLACE 1 DROP INTO BOTH EYES NIGHTLY 3 Bottle 3    metoprolol succinate (TOPROL-XL) 25 MG 24 hr tablet Take 1 tablet (25 mg total) by mouth once daily. 90 tablet 3    multivitamin (ONE DAILY MULTIVITAMIN) per tablet Take 1 tablet by mouth once daily.      timolol maleate 0.5% (TIMOPTIC) 0.5 % Drop Place 1 drop into the right eye every morning. 5 mL 12    turmeric root extract 500 mg Cap Take by mouth. Once daily per patient      vitamin A-vitamin C-vit E-min (OCUVITE) Tab Take 1 tablet by mouth Daily.      [DISCONTINUED] multivitamin (THERAGRAN) per tablet       [DISCONTINUED] omega 3-dha-epa-fish oil 1,000 mg (120 mg-180 mg) Cap           Diagnoses and health risks identified today and associated recommendations/orders:  1. Encounter for preventive health examination  Medicare aw complete. Health maintenance: up to date. Consider obtaining annual lab work.     2. Atrial fibrillation, permanent  Chronic and stable. Rate controlled. Rhythm irregular. No acute issues. Continue current management. See med list above. Follow up with PCP.     3. Anticoagulated  See #2.     4. Atherosclerosis of aorta  Chronic and stable. No acute issues. Continue current management. See med list above. Follow up with PCP.     5. Pulmonary heart disease  Chronic and stable. No acute issues. Continue current management. See med list above. Follow up with cardiology.      6. Osteopenia of multiple sites  Chronic and stable. No acute issues. Continue current management. See med list above. Follow up with PCP. Recommend vitamin d supplement, calcium in the diet, and weight bearing exercise. Patient states she needs prescription refill on Fosamax.     7. Nonexudative age-related macular degeneration, unspecified laterality, unspecified stage  Chronic and stable. No acute issues. Continue current  management. See med list above. Follow up with ophthalmology.      8. Primary open angle glaucoma of both eyes, moderate stage  Chronic and stable. No acute issues. Continue current management. See med list above. Follow up with ophthalmology.      9. Pseudophakia of both eyes  Chronic and stable. No acute issues. Continue current management. See med list above. Follow up with ophthalmology.      10. Chronic pain of right knee  Chronic and stable. No acute issues. Continue current management. See med list above. Follow up with PCP.     11. Osteoarthritis of right knee, unspecified osteoarthritis type  Chronic and stable. No acute issues. Continue current management. See med list above. Follow up with PCP.     12. Other reduced mobility  Chronic and stable. No acute issues. Continue current management. Fall precautions recommended. Follow up with PCP.          Provided Destini with a 5-10 year written screening schedule and personal prevention plan. Recommendations were developed using the USPSTF age appropriate recommendations. Education, counseling, and referrals were provided as needed.  After Visit Summary printed and given to patient which includes a list of additional screenings\tests needed.    Follow up in about 1 year (around 11/23/2021) for annual wellness visit.      ALLISON Borges  I offered to discuss end of life issues, including information on how to make advance directives that the patient could use to name someone who would make medical decisions on their behalf if they became too ill to make themselves.    ___Patient declined  _X_Patient will bring a copy of paperwork to pcp at next visit.

## 2020-11-23 NOTE — PATIENT INSTRUCTIONS
Counseling and Referral of Other Preventative  (Italic type indicates deductible and co-insurance are waived)    Patient Name: Destini Augustine  Today's Date: 11/23/2020    Health Maintenance       Date Due Completion Date    TETANUS VACCINE 08/01/2022 8/1/2012    Lipid Panel 10/05/2022 10/5/2017        No orders of the defined types were placed in this encounter.    The following information is provided to all patients.  This information is to help you find resources for any of the problems found today that may be affecting your health:                Living healthy guide: www.[facility].[place].[facility]      Understanding Diabetes: www.diabetes.org      Eating healthy: www.cdc.gov/healthyweight      CDC home safety checklist: www.cdc.gov/steadi/patient.html      Agency on Aging: www.goea.[place].[facility]      Alcoholics anonymous (AA): www.aa.org      Physical Activity: www.deidra.nih.gov/yc6qskz      Tobacco use: www.quitwithusla.org

## 2020-12-09 ENCOUNTER — OFFICE VISIT (OUTPATIENT)
Dept: CARDIOLOGY | Facility: CLINIC | Age: 85
End: 2020-12-09
Payer: MEDICARE

## 2020-12-09 ENCOUNTER — PATIENT MESSAGE (OUTPATIENT)
Dept: INTERNAL MEDICINE | Facility: CLINIC | Age: 85
End: 2020-12-09

## 2020-12-09 ENCOUNTER — TELEPHONE (OUTPATIENT)
Dept: INTERNAL MEDICINE | Facility: CLINIC | Age: 85
End: 2020-12-09

## 2020-12-09 VITALS
HEART RATE: 83 BPM | HEIGHT: 67 IN | DIASTOLIC BLOOD PRESSURE: 68 MMHG | OXYGEN SATURATION: 96 % | BODY MASS INDEX: 20.52 KG/M2 | WEIGHT: 130.75 LBS | SYSTOLIC BLOOD PRESSURE: 134 MMHG

## 2020-12-09 DIAGNOSIS — Z12.31 ENCOUNTER FOR SCREENING MAMMOGRAM FOR BREAST CANCER: Primary | ICD-10-CM

## 2020-12-09 DIAGNOSIS — I48.21 ATRIAL FIBRILLATION, PERMANENT: Primary | Chronic | ICD-10-CM

## 2020-12-09 DIAGNOSIS — I70.0 ATHEROSCLEROSIS OF AORTA: ICD-10-CM

## 2020-12-09 PROCEDURE — 1159F MED LIST DOCD IN RCRD: CPT | Mod: HCNC,S$GLB,, | Performed by: NUCLEAR MEDICINE

## 2020-12-09 PROCEDURE — 99214 PR OFFICE/OUTPT VISIT, EST, LEVL IV, 30-39 MIN: ICD-10-PCS | Mod: HCNC,S$GLB,, | Performed by: NUCLEAR MEDICINE

## 2020-12-09 PROCEDURE — 99999 PR PBB SHADOW E&M-EST. PATIENT-LVL IV: ICD-10-PCS | Mod: PBBFAC,HCNC,, | Performed by: NUCLEAR MEDICINE

## 2020-12-09 PROCEDURE — 99999 PR PBB SHADOW E&M-EST. PATIENT-LVL IV: CPT | Mod: PBBFAC,HCNC,, | Performed by: NUCLEAR MEDICINE

## 2020-12-09 PROCEDURE — 1159F PR MEDICATION LIST DOCUMENTED IN MEDICAL RECORD: ICD-10-PCS | Mod: HCNC,S$GLB,, | Performed by: NUCLEAR MEDICINE

## 2020-12-09 PROCEDURE — 99214 OFFICE O/P EST MOD 30 MIN: CPT | Mod: HCNC,S$GLB,, | Performed by: NUCLEAR MEDICINE

## 2020-12-30 ENCOUNTER — HOSPITAL ENCOUNTER (OUTPATIENT)
Dept: RADIOLOGY | Facility: HOSPITAL | Age: 85
Discharge: HOME | End: 2020-12-30
Attending: FAMILY MEDICINE
Payer: MEDICARE

## 2020-12-30 DIAGNOSIS — Z12.31 ENCOUNTER FOR SCREENING MAMMOGRAM FOR BREAST CANCER: ICD-10-CM

## 2020-12-30 PROCEDURE — 77063 MAMMO DIGITAL SCREENING BILAT WITH TOMO: ICD-10-PCS | Mod: 26,HCNC,, | Performed by: RADIOLOGY

## 2020-12-30 PROCEDURE — 77067 SCR MAMMO BI INCL CAD: CPT | Mod: 26,HCNC,, | Performed by: RADIOLOGY

## 2020-12-30 PROCEDURE — 77067 MAMMO DIGITAL SCREENING BILAT WITH TOMO: ICD-10-PCS | Mod: 26,HCNC,, | Performed by: RADIOLOGY

## 2020-12-30 PROCEDURE — 77063 BREAST TOMOSYNTHESIS BI: CPT | Mod: 26,HCNC,, | Performed by: RADIOLOGY

## 2020-12-30 PROCEDURE — 77067 SCR MAMMO BI INCL CAD: CPT | Mod: TC,HCNC

## 2021-01-05 ENCOUNTER — IMMUNIZATION (OUTPATIENT)
Dept: INTERNAL MEDICINE | Facility: CLINIC | Age: 86
End: 2021-01-05
Payer: MEDICARE

## 2021-01-05 DIAGNOSIS — Z23 NEED FOR VACCINATION: ICD-10-CM

## 2021-01-05 PROCEDURE — 91300 COVID-19, MRNA, LNP-S, PF, 30 MCG/0.3 ML DOSE VACCINE: CPT | Mod: PBBFAC | Performed by: FAMILY MEDICINE

## 2021-01-26 ENCOUNTER — IMMUNIZATION (OUTPATIENT)
Dept: INTERNAL MEDICINE | Facility: CLINIC | Age: 86
End: 2021-01-26
Payer: MEDICARE

## 2021-01-26 DIAGNOSIS — Z23 NEED FOR VACCINATION: Primary | ICD-10-CM

## 2021-01-26 PROCEDURE — 0002A COVID-19, MRNA, LNP-S, PF, 30 MCG/0.3 ML DOSE VACCINE: CPT | Mod: PBBFAC | Performed by: FAMILY MEDICINE

## 2021-01-26 PROCEDURE — 91300 COVID-19, MRNA, LNP-S, PF, 30 MCG/0.3 ML DOSE VACCINE: CPT | Mod: PBBFAC | Performed by: FAMILY MEDICINE

## 2021-02-25 ENCOUNTER — OFFICE VISIT (OUTPATIENT)
Dept: OPHTHALMOLOGY | Facility: CLINIC | Age: 86
End: 2021-02-25
Payer: MEDICARE

## 2021-02-25 DIAGNOSIS — H02.401 PTOSIS OF RIGHT EYELID: ICD-10-CM

## 2021-02-25 DIAGNOSIS — H40.1132 PRIMARY OPEN ANGLE GLAUCOMA OF BOTH EYES, MODERATE STAGE: Primary | ICD-10-CM

## 2021-02-25 DIAGNOSIS — Z96.1 PSEUDOPHAKIA OF BOTH EYES: ICD-10-CM

## 2021-02-25 PROCEDURE — 99999 PR PBB SHADOW E&M-EST. PATIENT-LVL II: ICD-10-PCS | Mod: PBBFAC,,, | Performed by: OPHTHALMOLOGY

## 2021-02-25 PROCEDURE — 99214 PR OFFICE/OUTPT VISIT, EST, LEVL IV, 30-39 MIN: ICD-10-PCS | Mod: S$GLB,,, | Performed by: OPHTHALMOLOGY

## 2021-02-25 PROCEDURE — 99214 OFFICE O/P EST MOD 30 MIN: CPT | Mod: S$GLB,,, | Performed by: OPHTHALMOLOGY

## 2021-02-25 PROCEDURE — 99999 PR PBB SHADOW E&M-EST. PATIENT-LVL II: CPT | Mod: PBBFAC,,, | Performed by: OPHTHALMOLOGY

## 2021-02-25 RX ORDER — ALENDRONATE SODIUM 70 MG/1
TABLET ORAL
COMMUNITY
Start: 2021-01-25 | End: 2021-05-10

## 2021-04-10 ENCOUNTER — PATIENT MESSAGE (OUTPATIENT)
Dept: OPHTHALMOLOGY | Facility: CLINIC | Age: 86
End: 2021-04-10

## 2021-04-19 ENCOUNTER — PATIENT MESSAGE (OUTPATIENT)
Dept: INTERNAL MEDICINE | Facility: CLINIC | Age: 86
End: 2021-04-19

## 2021-04-20 RX ORDER — ALENDRONATE SODIUM 70 MG/1
TABLET ORAL
Status: CANCELLED | OUTPATIENT
Start: 2021-04-20

## 2021-04-21 RX ORDER — ALENDRONATE SODIUM 70 MG/1
70 TABLET ORAL
Qty: 12 TABLET | Refills: 4 | Status: SHIPPED | OUTPATIENT
Start: 2021-04-21 | End: 2021-04-28 | Stop reason: SDUPTHER

## 2021-04-28 RX ORDER — ALENDRONATE SODIUM 70 MG/1
70 TABLET ORAL
Qty: 12 TABLET | Refills: 4 | Status: SHIPPED | OUTPATIENT
Start: 2021-04-28 | End: 2022-06-16

## 2021-05-10 ENCOUNTER — PATIENT MESSAGE (OUTPATIENT)
Dept: TRANSPLANT | Facility: CLINIC | Age: 86
End: 2021-05-10

## 2021-05-24 DIAGNOSIS — I48.21 ATRIAL FIBRILLATION, PERMANENT: Primary | ICD-10-CM

## 2021-06-07 ENCOUNTER — PES CALL (OUTPATIENT)
Dept: ADMINISTRATIVE | Facility: CLINIC | Age: 86
End: 2021-06-07

## 2021-06-09 ENCOUNTER — HOSPITAL ENCOUNTER (OUTPATIENT)
Dept: CARDIOLOGY | Facility: HOSPITAL | Age: 86
Discharge: HOME | End: 2021-06-09
Attending: NUCLEAR MEDICINE
Payer: MEDICARE

## 2021-06-09 ENCOUNTER — OFFICE VISIT (OUTPATIENT)
Dept: TRANSPLANT | Facility: CLINIC | Age: 86
End: 2021-06-09
Payer: MEDICARE

## 2021-06-09 VITALS
BODY MASS INDEX: 20.41 KG/M2 | WEIGHT: 130.06 LBS | OXYGEN SATURATION: 95 % | SYSTOLIC BLOOD PRESSURE: 135 MMHG | HEART RATE: 78 BPM | HEIGHT: 67 IN | DIASTOLIC BLOOD PRESSURE: 80 MMHG

## 2021-06-09 DIAGNOSIS — I70.0 ATHEROSCLEROSIS OF AORTA: ICD-10-CM

## 2021-06-09 DIAGNOSIS — I48.21 ATRIAL FIBRILLATION, PERMANENT: ICD-10-CM

## 2021-06-09 DIAGNOSIS — I48.21 ATRIAL FIBRILLATION, PERMANENT: Primary | Chronic | ICD-10-CM

## 2021-06-09 PROCEDURE — 99214 PR OFFICE/OUTPT VISIT, EST, LEVL IV, 30-39 MIN: ICD-10-PCS | Mod: S$GLB,,, | Performed by: NUCLEAR MEDICINE

## 2021-06-09 PROCEDURE — 1159F MED LIST DOCD IN RCRD: CPT | Mod: S$GLB,,, | Performed by: NUCLEAR MEDICINE

## 2021-06-09 PROCEDURE — 1159F PR MEDICATION LIST DOCUMENTED IN MEDICAL RECORD: ICD-10-PCS | Mod: S$GLB,,, | Performed by: NUCLEAR MEDICINE

## 2021-06-09 PROCEDURE — 93010 EKG 12-LEAD: ICD-10-PCS | Mod: ,,, | Performed by: INTERNAL MEDICINE

## 2021-06-09 PROCEDURE — 99499 UNLISTED E&M SERVICE: CPT | Mod: HCNC,S$GLB,, | Performed by: NUCLEAR MEDICINE

## 2021-06-09 PROCEDURE — 93010 ELECTROCARDIOGRAM REPORT: CPT | Mod: ,,, | Performed by: INTERNAL MEDICINE

## 2021-06-09 PROCEDURE — 99214 OFFICE O/P EST MOD 30 MIN: CPT | Mod: S$GLB,,, | Performed by: NUCLEAR MEDICINE

## 2021-06-09 PROCEDURE — 99999 PR PBB SHADOW E&M-EST. PATIENT-LVL III: ICD-10-PCS | Mod: PBBFAC,,, | Performed by: NUCLEAR MEDICINE

## 2021-06-09 PROCEDURE — 93005 ELECTROCARDIOGRAM TRACING: CPT

## 2021-06-09 PROCEDURE — 99999 PR PBB SHADOW E&M-EST. PATIENT-LVL III: CPT | Mod: PBBFAC,,, | Performed by: NUCLEAR MEDICINE

## 2021-06-09 PROCEDURE — 99499 RISK ADDL DX/OHS AUDIT: ICD-10-PCS | Mod: HCNC,S$GLB,, | Performed by: NUCLEAR MEDICINE

## 2021-06-16 ENCOUNTER — TELEPHONE (OUTPATIENT)
Dept: ADMINISTRATIVE | Facility: HOSPITAL | Age: 86
End: 2021-06-16

## 2021-06-16 ENCOUNTER — PATIENT OUTREACH (OUTPATIENT)
Dept: ADMINISTRATIVE | Facility: HOSPITAL | Age: 86
End: 2021-06-16

## 2021-06-28 ENCOUNTER — OFFICE VISIT (OUTPATIENT)
Dept: OPHTHALMOLOGY | Facility: CLINIC | Age: 86
End: 2021-06-28
Payer: MEDICARE

## 2021-06-28 DIAGNOSIS — H00.12 CHALAZION OF RIGHT LOWER EYELID: ICD-10-CM

## 2021-06-28 DIAGNOSIS — H40.1132 PRIMARY OPEN ANGLE GLAUCOMA OF BOTH EYES, MODERATE STAGE: Primary | ICD-10-CM

## 2021-06-28 DIAGNOSIS — H01.02A SQUAMOUS BLEPHARITIS OF UPPER AND LOWER EYELIDS OF BOTH EYES: ICD-10-CM

## 2021-06-28 DIAGNOSIS — Z96.1 PSEUDOPHAKIA OF BOTH EYES: ICD-10-CM

## 2021-06-28 DIAGNOSIS — H01.02B SQUAMOUS BLEPHARITIS OF UPPER AND LOWER EYELIDS OF BOTH EYES: ICD-10-CM

## 2021-06-28 PROCEDURE — 99214 PR OFFICE/OUTPT VISIT, EST, LEVL IV, 30-39 MIN: ICD-10-PCS | Mod: S$GLB,,, | Performed by: OPHTHALMOLOGY

## 2021-06-28 PROCEDURE — 99999 PR PBB SHADOW E&M-EST. PATIENT-LVL III: CPT | Mod: PBBFAC,,, | Performed by: OPHTHALMOLOGY

## 2021-06-28 PROCEDURE — 99999 PR PBB SHADOW E&M-EST. PATIENT-LVL III: ICD-10-PCS | Mod: PBBFAC,,, | Performed by: OPHTHALMOLOGY

## 2021-06-28 PROCEDURE — 1159F PR MEDICATION LIST DOCUMENTED IN MEDICAL RECORD: ICD-10-PCS | Mod: S$GLB,,, | Performed by: OPHTHALMOLOGY

## 2021-06-28 PROCEDURE — 99214 OFFICE O/P EST MOD 30 MIN: CPT | Mod: S$GLB,,, | Performed by: OPHTHALMOLOGY

## 2021-06-28 PROCEDURE — 92133 CPTRZD OPH DX IMG PST SGM ON: CPT | Mod: S$GLB,,, | Performed by: OPHTHALMOLOGY

## 2021-06-28 PROCEDURE — 1159F MED LIST DOCD IN RCRD: CPT | Mod: S$GLB,,, | Performed by: OPHTHALMOLOGY

## 2021-06-28 PROCEDURE — 92133 POSTERIOR SEGMENT OCT OPTIC NERVE(OCULAR COHERENCE TOMOGRAPHY) - OU - BOTH EYES: ICD-10-PCS | Mod: S$GLB,,, | Performed by: OPHTHALMOLOGY

## 2021-06-28 RX ORDER — ERYTHROMYCIN 5 MG/G
OINTMENT OPHTHALMIC 4 TIMES DAILY PRN
Qty: 1 TUBE | Refills: 3 | Status: SHIPPED | OUTPATIENT
Start: 2021-06-28 | End: 2021-10-18

## 2021-08-06 ENCOUNTER — TELEPHONE (OUTPATIENT)
Dept: INTERNAL MEDICINE | Facility: CLINIC | Age: 86
End: 2021-08-06

## 2021-08-06 ENCOUNTER — PATIENT MESSAGE (OUTPATIENT)
Dept: INTERNAL MEDICINE | Facility: CLINIC | Age: 86
End: 2021-08-06

## 2021-08-08 ENCOUNTER — PATIENT MESSAGE (OUTPATIENT)
Dept: INTERNAL MEDICINE | Facility: CLINIC | Age: 86
End: 2021-08-08

## 2021-08-09 ENCOUNTER — PATIENT MESSAGE (OUTPATIENT)
Dept: INTERNAL MEDICINE | Facility: CLINIC | Age: 86
End: 2021-08-09

## 2021-08-12 ENCOUNTER — HOSPITAL ENCOUNTER (OUTPATIENT)
Dept: RADIOLOGY | Facility: HOSPITAL | Age: 86
Discharge: HOME | End: 2021-08-12
Attending: PHYSICIAN ASSISTANT
Payer: MEDICARE

## 2021-08-12 ENCOUNTER — OFFICE VISIT (OUTPATIENT)
Dept: INTERNAL MEDICINE | Facility: CLINIC | Age: 86
End: 2021-08-12
Payer: MEDICARE

## 2021-08-12 VITALS
OXYGEN SATURATION: 98 % | SYSTOLIC BLOOD PRESSURE: 136 MMHG | TEMPERATURE: 99 F | WEIGHT: 130.31 LBS | BODY MASS INDEX: 20.45 KG/M2 | HEART RATE: 72 BPM | HEIGHT: 67 IN | DIASTOLIC BLOOD PRESSURE: 78 MMHG

## 2021-08-12 DIAGNOSIS — I48.21 ATRIAL FIBRILLATION, PERMANENT: Chronic | ICD-10-CM

## 2021-08-12 DIAGNOSIS — H40.1132 PRIMARY OPEN ANGLE GLAUCOMA OF BOTH EYES, MODERATE STAGE: ICD-10-CM

## 2021-08-12 DIAGNOSIS — I27.9 PULMONARY HEART DISEASE: ICD-10-CM

## 2021-08-12 DIAGNOSIS — M25.532 WRIST PAIN, ACUTE, LEFT: ICD-10-CM

## 2021-08-12 DIAGNOSIS — B35.1 ONYCHOMYCOSIS OF GREAT TOE: ICD-10-CM

## 2021-08-12 DIAGNOSIS — L60.1 ONYCHOLYSIS: ICD-10-CM

## 2021-08-12 DIAGNOSIS — M85.89 OSTEOPENIA OF MULTIPLE SITES: ICD-10-CM

## 2021-08-12 DIAGNOSIS — H35.3190 NONEXUDATIVE AGE-RELATED MACULAR DEGENERATION, UNSPECIFIED LATERALITY, UNSPECIFIED STAGE: ICD-10-CM

## 2021-08-12 DIAGNOSIS — M65.4 DE QUERVAIN'S TENOSYNOVITIS: ICD-10-CM

## 2021-08-12 DIAGNOSIS — S69.92XA SCAPHOLUNATE LIGAMENT INJURY WITH NO INSTABILITY, LEFT, INITIAL ENCOUNTER: Primary | ICD-10-CM

## 2021-08-12 DIAGNOSIS — I70.0 ATHEROSCLEROSIS OF AORTA: ICD-10-CM

## 2021-08-12 DIAGNOSIS — Z00.00 PREVENTATIVE HEALTH CARE: Primary | ICD-10-CM

## 2021-08-12 DIAGNOSIS — Z79.01 ANTICOAGULATED: ICD-10-CM

## 2021-08-12 PROCEDURE — 99214 OFFICE O/P EST MOD 30 MIN: CPT | Mod: S$GLB,,, | Performed by: PHYSICIAN ASSISTANT

## 2021-08-12 PROCEDURE — 73110 X-RAY EXAM OF WRIST: CPT | Mod: 26,LT,, | Performed by: RADIOLOGY

## 2021-08-12 PROCEDURE — 73110 X-RAY EXAM OF WRIST: CPT | Mod: TC,LT

## 2021-08-12 PROCEDURE — 3288F PR FALLS RISK ASSESSMENT DOCUMENTED: ICD-10-PCS | Mod: CPTII,S$GLB,, | Performed by: PHYSICIAN ASSISTANT

## 2021-08-12 PROCEDURE — 99499 RISK ADDL DX/OHS AUDIT: ICD-10-PCS | Mod: HCNC,S$GLB,, | Performed by: PHYSICIAN ASSISTANT

## 2021-08-12 PROCEDURE — 1101F PR PT FALLS ASSESS DOC 0-1 FALLS W/OUT INJ PAST YR: ICD-10-PCS | Mod: CPTII,S$GLB,, | Performed by: PHYSICIAN ASSISTANT

## 2021-08-12 PROCEDURE — 1159F MED LIST DOCD IN RCRD: CPT | Mod: CPTII,S$GLB,, | Performed by: PHYSICIAN ASSISTANT

## 2021-08-12 PROCEDURE — 1101F PT FALLS ASSESS-DOCD LE1/YR: CPT | Mod: CPTII,S$GLB,, | Performed by: PHYSICIAN ASSISTANT

## 2021-08-12 PROCEDURE — 99499 UNLISTED E&M SERVICE: CPT | Mod: HCNC,S$GLB,, | Performed by: PHYSICIAN ASSISTANT

## 2021-08-12 PROCEDURE — 99999 PR PBB SHADOW E&M-EST. PATIENT-LVL V: ICD-10-PCS | Mod: PBBFAC,,, | Performed by: PHYSICIAN ASSISTANT

## 2021-08-12 PROCEDURE — 99999 PR PBB SHADOW E&M-EST. PATIENT-LVL V: CPT | Mod: PBBFAC,,, | Performed by: PHYSICIAN ASSISTANT

## 2021-08-12 PROCEDURE — 1126F AMNT PAIN NOTED NONE PRSNT: CPT | Mod: CPTII,S$GLB,, | Performed by: PHYSICIAN ASSISTANT

## 2021-08-12 PROCEDURE — 1126F PR PAIN SEVERITY QUANTIFIED, NO PAIN PRESENT: ICD-10-PCS | Mod: CPTII,S$GLB,, | Performed by: PHYSICIAN ASSISTANT

## 2021-08-12 PROCEDURE — 73110 XR WRIST COMPLETE 3 VIEWS LEFT: ICD-10-PCS | Mod: 26,LT,, | Performed by: RADIOLOGY

## 2021-08-12 PROCEDURE — 99214 PR OFFICE/OUTPT VISIT, EST, LEVL IV, 30-39 MIN: ICD-10-PCS | Mod: S$GLB,,, | Performed by: PHYSICIAN ASSISTANT

## 2021-08-12 PROCEDURE — 1160F RVW MEDS BY RX/DR IN RCRD: CPT | Mod: CPTII,S$GLB,, | Performed by: PHYSICIAN ASSISTANT

## 2021-08-12 PROCEDURE — 3288F FALL RISK ASSESSMENT DOCD: CPT | Mod: CPTII,S$GLB,, | Performed by: PHYSICIAN ASSISTANT

## 2021-08-12 PROCEDURE — 1159F PR MEDICATION LIST DOCUMENTED IN MEDICAL RECORD: ICD-10-PCS | Mod: CPTII,S$GLB,, | Performed by: PHYSICIAN ASSISTANT

## 2021-08-12 PROCEDURE — 1160F PR REVIEW ALL MEDS BY PRESCRIBER/CLIN PHARMACIST DOCUMENTED: ICD-10-PCS | Mod: CPTII,S$GLB,, | Performed by: PHYSICIAN ASSISTANT

## 2021-08-13 ENCOUNTER — PATIENT MESSAGE (OUTPATIENT)
Dept: INTERNAL MEDICINE | Facility: CLINIC | Age: 86
End: 2021-08-13

## 2021-08-18 ENCOUNTER — OFFICE VISIT (OUTPATIENT)
Dept: PODIATRY | Facility: CLINIC | Age: 86
End: 2021-08-18
Payer: MEDICARE

## 2021-08-18 VITALS
SYSTOLIC BLOOD PRESSURE: 136 MMHG | HEART RATE: 64 BPM | HEIGHT: 67 IN | BODY MASS INDEX: 20.4 KG/M2 | DIASTOLIC BLOOD PRESSURE: 77 MMHG | WEIGHT: 130 LBS

## 2021-08-18 DIAGNOSIS — L60.1 ONYCHOLYSIS: ICD-10-CM

## 2021-08-18 DIAGNOSIS — B35.1 ONYCHOMYCOSIS OF GREAT TOE: Primary | ICD-10-CM

## 2021-08-18 PROCEDURE — 1101F PR PT FALLS ASSESS DOC 0-1 FALLS W/OUT INJ PAST YR: ICD-10-PCS | Mod: CPTII,S$GLB,, | Performed by: PODIATRIST

## 2021-08-18 PROCEDURE — 3288F PR FALLS RISK ASSESSMENT DOCUMENTED: ICD-10-PCS | Mod: CPTII,S$GLB,, | Performed by: PODIATRIST

## 2021-08-18 PROCEDURE — 99203 PR OFFICE/OUTPT VISIT, NEW, LEVL III, 30-44 MIN: ICD-10-PCS | Mod: S$GLB,,, | Performed by: PODIATRIST

## 2021-08-18 PROCEDURE — 99999 PR PBB SHADOW E&M-EST. PATIENT-LVL IV: ICD-10-PCS | Mod: PBBFAC,,, | Performed by: PODIATRIST

## 2021-08-18 PROCEDURE — 1126F PR PAIN SEVERITY QUANTIFIED, NO PAIN PRESENT: ICD-10-PCS | Mod: CPTII,S$GLB,, | Performed by: PODIATRIST

## 2021-08-18 PROCEDURE — 1126F AMNT PAIN NOTED NONE PRSNT: CPT | Mod: CPTII,S$GLB,, | Performed by: PODIATRIST

## 2021-08-18 PROCEDURE — 99203 OFFICE O/P NEW LOW 30 MIN: CPT | Mod: S$GLB,,, | Performed by: PODIATRIST

## 2021-08-18 PROCEDURE — 1160F PR REVIEW ALL MEDS BY PRESCRIBER/CLIN PHARMACIST DOCUMENTED: ICD-10-PCS | Mod: CPTII,S$GLB,, | Performed by: PODIATRIST

## 2021-08-18 PROCEDURE — 1160F RVW MEDS BY RX/DR IN RCRD: CPT | Mod: CPTII,S$GLB,, | Performed by: PODIATRIST

## 2021-08-18 PROCEDURE — 99999 PR PBB SHADOW E&M-EST. PATIENT-LVL IV: CPT | Mod: PBBFAC,,, | Performed by: PODIATRIST

## 2021-08-18 PROCEDURE — 1159F PR MEDICATION LIST DOCUMENTED IN MEDICAL RECORD: ICD-10-PCS | Mod: CPTII,S$GLB,, | Performed by: PODIATRIST

## 2021-08-18 PROCEDURE — 1101F PT FALLS ASSESS-DOCD LE1/YR: CPT | Mod: CPTII,S$GLB,, | Performed by: PODIATRIST

## 2021-08-18 PROCEDURE — 3288F FALL RISK ASSESSMENT DOCD: CPT | Mod: CPTII,S$GLB,, | Performed by: PODIATRIST

## 2021-08-18 PROCEDURE — 1159F MED LIST DOCD IN RCRD: CPT | Mod: CPTII,S$GLB,, | Performed by: PODIATRIST

## 2021-08-28 NOTE — PROGRESS NOTES
Subjective:   Patient ID:  Destini Augustine is a 87 y.o. female who presents for follow-up of Atrial Fibrillation (LONGSTANDING PERSISTENT) and ATHEROSCLEROSIS OF AORTA      HPI DOING WELL. NO RECENT HOSPITALIZATIONS OR ED VISITS FOR EXACERBATION OF ARRHYTHMIAS. OR CVA OR ADHF OR ACS  NO ABNORMAL BLEEDING ON NOAC/ ELIQUIS  NO UNUSUAL JACKSON WITH ORDINARY DAILY ACTIVITIES  NO ORTHOPNEA OR PND  NO FOCAL CNS SYMPTOMS OR SIGNS TO SUGGEST TIA OR STROKE  NO CHEST DISCOMFORT= ANGINAL OR PLEURITIC  NO ABDOMINAL DISCOMFORT  NO EDEMA. NO CALVE TENDERNESS    Review of Systems   Constitution: Negative for chills, fever, night sweats, weight gain and weight loss.   HENT: Negative for nosebleeds.    Eyes: Negative for blurred vision, double vision and visual disturbance.   Cardiovascular: Negative for chest pain, dyspnea on exertion, irregular heartbeat, leg swelling, orthopnea, palpitations, paroxysmal nocturnal dyspnea and syncope.   Respiratory: Negative for cough, hemoptysis and wheezing.    Endocrine: Negative for polydipsia and polyuria.   Hematologic/Lymphatic: Does not bruise/bleed easily.   Skin: Negative for rash.   Musculoskeletal: Negative for joint pain, joint swelling, muscle weakness and myalgias.   Gastrointestinal: Negative for abdominal pain, hematemesis, jaundice and melena.   Genitourinary: Negative for dysuria, hematuria and nocturia.   Neurological: Negative for dizziness, focal weakness, headaches, sensory change and weakness.   Psychiatric/Behavioral: Negative for depression. The patient does not have insomnia and is not nervous/anxious.      Family History   Problem Relation Age of Onset    Cataracts Mother     Hypertension Mother     Stroke Mother     Cancer Father         colon    Thyroid disease Father     Cancer Brother         lung    Tuberculosis Brother     Blindness Neg Hx     Diabetes Neg Hx     Macular degeneration Neg Hx     Retinal detachment Neg Hx     Strabismus Neg Hx      Past  Medical History:   Diagnosis Date    Arthritis     knees, hands    Atrial fibrillation     Basal cell carcinoma     face, right thigh    COAG (chronic open-angle glaucoma) - Both Eyes 2013    Ex-smoker     Hamartoma     mandible    Macular degeneration     Osteopenia      shama     Scoliosis 11/30/15    lumbar x-ray     Social History     Socioeconomic History    Marital status:      Spouse name: Not on file    Number of children: 2    Years of education: Not on file    Highest education level: Not on file   Occupational History    Not on file   Social Needs    Financial resource strain: Not hard at all    Food insecurity     Worry: Never true     Inability: Never true    Transportation needs     Medical: No     Non-medical: No   Tobacco Use    Smoking status: Former Smoker     Packs/day: 1.50     Years: 30.00     Pack years: 45.00     Quit date: 1980     Years since quittin.8    Smokeless tobacco: Never Used   Substance and Sexual Activity    Alcohol use: Not Currently     Frequency: Monthly or less     Drinks per session: 1 or 2     Binge frequency: Never    Drug use: No    Sexual activity: Never     Partners: Female   Lifestyle    Physical activity     Days per week: 3 days     Minutes per session: 40 min    Stress: Only a little   Relationships    Social connections     Talks on phone: Three times a week     Gets together: Once a week     Attends Gnosticism service: Not on file     Active member of club or organization: Yes     Attends meetings of clubs or organizations: More than 4 times per year     Relationship status:    Other Topics Concern    Not on file   Social History Narrative    Lives alone, no pets or smokers in household.     Current Outpatient Medications on File Prior to Visit   Medication Sig Dispense Refill    BIOTIN ORAL Take 1 tablet by mouth 2 (two) times daily. Once daily per patient      CALCIUM CARBONATE/VITAMIN D3 (CALCIUM 600  + D,3, ORAL) Take 1 capsule by mouth 2 (two) times daily.      DOCOSAHEXANOIC ACID/EPA (FISH OIL ORAL) Take 1 capsule by mouth 2 (two) times daily.       ELIQUIS 2.5 mg Tab TAKE 1 TABLET TWICE DAILY 180 tablet 3    metoprolol succinate (TOPROL-XL) 25 MG 24 hr tablet Take 1 tablet (25 mg total) by mouth once daily. 90 tablet 3    multivitamin (ONE DAILY MULTIVITAMIN) per tablet Take 1 tablet by mouth once daily.      turmeric root extract 500 mg Cap Take by mouth. Once daily per patient      vitamin A-vitamin C-vit E-min (OCUVITE) Tab Take 1 tablet by mouth Daily.      GLUCOSAMINE HCL/CHONDRO COBB A (GLUCOSAMINE-CHONDROITIN ORAL) Take 1 tablet by mouth 2 (two) times daily.       latanoprost 0.005 % ophthalmic solution PLACE 1 DROP INTO BOTH EYES NIGHTLY 3 Bottle 3    timolol maleate 0.5% (TIMOPTIC) 0.5 % Drop Place 1 drop into the right eye every morning. 5 mL 12     No current facility-administered medications on file prior to visit.      Review of patient's allergies indicates:   Allergen Reactions    Shellfish containing products Diarrhea and Nausea And Vomiting    Venom-wasp Itching and Swelling       Objective:     Physical Exam   Constitutional: She is oriented to person, place, and time. She appears well-developed. No distress.   HENT:   Head: Normocephalic.   Eyes: Pupils are equal, round, and reactive to light. Conjunctivae are normal. No scleral icterus.   Neck: Normal range of motion. Neck supple. Normal carotid pulses, no hepatojugular reflux and no JVD present. Carotid bruit is not present. No edema present. No thyroid mass and no thyromegaly present.   Cardiovascular: Normal rate, S1 normal, S2 normal, normal heart sounds and intact distal pulses. An irregularly irregular rhythm present. PMI is not displaced. Exam reveals no gallop and no friction rub.   No murmur heard.  Pulses:       Carotid pulses are 2+ on the right side and 2+ on the left side.       Radial pulses are 2+ on the right side  and 2+ on the left side.        Femoral pulses are 2+ on the right side and 2+ on the left side.       Popliteal pulses are 2+ on the right side and 2+ on the left side.        Dorsalis pedis pulses are 2+ on the right side and 2+ on the left side.        Posterior tibial pulses are 2+ on the right side and 2+ on the left side.   Pulmonary/Chest: Effort normal and breath sounds normal. She has no wheezes. She has no rales. She exhibits no tenderness.   Abdominal: Soft. Bowel sounds are normal. She exhibits no pulsatile midline mass and no mass. There is no hepatosplenomegaly. There is no abdominal tenderness.   Musculoskeletal: Normal range of motion.         General: No tenderness or edema.      Cervical back: Normal.      Thoracic back: Normal.      Lumbar back: Normal.   Lymphadenopathy:     She has no cervical adenopathy.     She has no axillary adenopathy.        Right: No supraclavicular adenopathy present.        Left: No supraclavicular adenopathy present.   Neurological: She is alert and oriented to person, place, and time. She has normal strength and normal reflexes. No sensory deficit. Gait normal.   Skin: Skin is warm. No rash noted. No cyanosis. No pallor. Nails show no clubbing.   Psychiatric: She has a normal mood and affect. Her speech is normal and behavior is normal. Cognition and memory are normal.       Assessment:     1. Atrial fibrillation, permanent    2. Atherosclerosis of aorta        Plan:     Atrial fibrillation, permanent  RATE CONTROLLED  CNS STATUS STABLE  NO ABNORMAL BLEEDING ON NOAC  NO ACTIVE MYOCARDIAL ISCHEMIA  NO ADHF.    Atherosclerosis of aorta  ASYMPTOMATIC/ STABLE    CONTINUE PRESENT CARD MANAGEMENT  RETURN IN 6 MONTHS.           None

## 2021-09-09 ENCOUNTER — PES CALL (OUTPATIENT)
Dept: ADMINISTRATIVE | Facility: CLINIC | Age: 86
End: 2021-09-09

## 2021-09-13 ENCOUNTER — OFFICE VISIT (OUTPATIENT)
Dept: SPORTS MEDICINE | Facility: CLINIC | Age: 86
End: 2021-09-13
Payer: MEDICARE

## 2021-09-13 VITALS
WEIGHT: 130.06 LBS | BODY MASS INDEX: 20.41 KG/M2 | HEIGHT: 67 IN | DIASTOLIC BLOOD PRESSURE: 81 MMHG | SYSTOLIC BLOOD PRESSURE: 144 MMHG | HEART RATE: 83 BPM

## 2021-09-13 DIAGNOSIS — M19.032 ARTHRITIS OF LEFT WRIST: ICD-10-CM

## 2021-09-13 DIAGNOSIS — M85.80 OSTEOPENIA, UNSPECIFIED LOCATION: ICD-10-CM

## 2021-09-13 DIAGNOSIS — S69.92XA SCAPHOLUNATE LIGAMENT INJURY WITH NO INSTABILITY, LEFT, INITIAL ENCOUNTER: Primary | ICD-10-CM

## 2021-09-13 DIAGNOSIS — Z79.01 ANTICOAGULATED: ICD-10-CM

## 2021-09-13 PROBLEM — S69.90XA SCAPHOLUNATE LIGAMENT INJURY WITH NO INSTABILITY: Status: ACTIVE | Noted: 2021-09-13

## 2021-09-13 PROCEDURE — 99204 PR OFFICE/OUTPT VISIT, NEW, LEVL IV, 45-59 MIN: ICD-10-PCS | Mod: S$GLB,,, | Performed by: FAMILY MEDICINE

## 2021-09-13 PROCEDURE — 99999 PR PBB SHADOW E&M-EST. PATIENT-LVL IV: CPT | Mod: PBBFAC,,, | Performed by: FAMILY MEDICINE

## 2021-09-13 PROCEDURE — 1125F PR PAIN SEVERITY QUANTIFIED, PAIN PRESENT: ICD-10-PCS | Mod: CPTII,S$GLB,, | Performed by: FAMILY MEDICINE

## 2021-09-13 PROCEDURE — 1125F AMNT PAIN NOTED PAIN PRSNT: CPT | Mod: CPTII,S$GLB,, | Performed by: FAMILY MEDICINE

## 2021-09-13 PROCEDURE — 1159F PR MEDICATION LIST DOCUMENTED IN MEDICAL RECORD: ICD-10-PCS | Mod: CPTII,S$GLB,, | Performed by: FAMILY MEDICINE

## 2021-09-13 PROCEDURE — 1101F PT FALLS ASSESS-DOCD LE1/YR: CPT | Mod: CPTII,S$GLB,, | Performed by: FAMILY MEDICINE

## 2021-09-13 PROCEDURE — 1101F PR PT FALLS ASSESS DOC 0-1 FALLS W/OUT INJ PAST YR: ICD-10-PCS | Mod: CPTII,S$GLB,, | Performed by: FAMILY MEDICINE

## 2021-09-13 PROCEDURE — 99999 PR PBB SHADOW E&M-EST. PATIENT-LVL IV: ICD-10-PCS | Mod: PBBFAC,,, | Performed by: FAMILY MEDICINE

## 2021-09-13 PROCEDURE — 3288F PR FALLS RISK ASSESSMENT DOCUMENTED: ICD-10-PCS | Mod: CPTII,S$GLB,, | Performed by: FAMILY MEDICINE

## 2021-09-13 PROCEDURE — 1159F MED LIST DOCD IN RCRD: CPT | Mod: CPTII,S$GLB,, | Performed by: FAMILY MEDICINE

## 2021-09-13 PROCEDURE — 99204 OFFICE O/P NEW MOD 45 MIN: CPT | Mod: S$GLB,,, | Performed by: FAMILY MEDICINE

## 2021-09-13 PROCEDURE — 3288F FALL RISK ASSESSMENT DOCD: CPT | Mod: CPTII,S$GLB,, | Performed by: FAMILY MEDICINE

## 2021-09-14 ENCOUNTER — PATIENT MESSAGE (OUTPATIENT)
Dept: INTERNAL MEDICINE | Facility: CLINIC | Age: 86
End: 2021-09-14

## 2021-10-18 ENCOUNTER — OFFICE VISIT (OUTPATIENT)
Dept: HOME HEALTH SERVICES | Facility: CLINIC | Age: 86
End: 2021-10-18
Payer: MEDICARE

## 2021-10-18 VITALS
DIASTOLIC BLOOD PRESSURE: 75 MMHG | HEART RATE: 55 BPM | TEMPERATURE: 98 F | BODY MASS INDEX: 20.4 KG/M2 | OXYGEN SATURATION: 96 % | HEIGHT: 67 IN | WEIGHT: 130 LBS | SYSTOLIC BLOOD PRESSURE: 127 MMHG

## 2021-10-18 DIAGNOSIS — M17.11 OSTEOARTHRITIS OF RIGHT KNEE, UNSPECIFIED OSTEOARTHRITIS TYPE: ICD-10-CM

## 2021-10-18 DIAGNOSIS — I48.21 ATRIAL FIBRILLATION, PERMANENT: Chronic | ICD-10-CM

## 2021-10-18 DIAGNOSIS — I70.0 ATHEROSCLEROSIS OF AORTA: ICD-10-CM

## 2021-10-18 DIAGNOSIS — M85.80 OSTEOPENIA, UNSPECIFIED LOCATION: ICD-10-CM

## 2021-10-18 DIAGNOSIS — H40.1132 PRIMARY OPEN ANGLE GLAUCOMA OF BOTH EYES, MODERATE STAGE: ICD-10-CM

## 2021-10-18 DIAGNOSIS — Z96.1 PSEUDOPHAKIA OF BOTH EYES: ICD-10-CM

## 2021-10-18 DIAGNOSIS — Z00.00 ENCOUNTER FOR PREVENTIVE HEALTH EXAMINATION: Primary | ICD-10-CM

## 2021-10-18 DIAGNOSIS — I27.9 PULMONARY HEART DISEASE: ICD-10-CM

## 2021-10-18 DIAGNOSIS — H35.3190 NONEXUDATIVE AGE-RELATED MACULAR DEGENERATION, UNSPECIFIED LATERALITY, UNSPECIFIED STAGE: ICD-10-CM

## 2021-10-18 DIAGNOSIS — S69.92XD SCAPHOLUNATE LIGAMENT INJURY WITH NO INSTABILITY, LEFT, SUBSEQUENT ENCOUNTER: ICD-10-CM

## 2021-10-18 PROCEDURE — G0439 PPPS, SUBSEQ VISIT: HCPCS | Mod: S$GLB,,, | Performed by: NURSE PRACTITIONER

## 2021-10-18 PROCEDURE — 1159F MED LIST DOCD IN RCRD: CPT | Mod: CPTII,S$GLB,, | Performed by: NURSE PRACTITIONER

## 2021-10-18 PROCEDURE — 3288F PR FALLS RISK ASSESSMENT DOCUMENTED: ICD-10-PCS | Mod: CPTII,S$GLB,, | Performed by: NURSE PRACTITIONER

## 2021-10-18 PROCEDURE — 3288F FALL RISK ASSESSMENT DOCD: CPT | Mod: CPTII,S$GLB,, | Performed by: NURSE PRACTITIONER

## 2021-10-18 PROCEDURE — 1126F PR PAIN SEVERITY QUANTIFIED, NO PAIN PRESENT: ICD-10-PCS | Mod: CPTII,S$GLB,, | Performed by: NURSE PRACTITIONER

## 2021-10-18 PROCEDURE — 1170F PR FUNCTIONAL STATUS ASSESSED: ICD-10-PCS | Mod: CPTII,S$GLB,, | Performed by: NURSE PRACTITIONER

## 2021-10-18 PROCEDURE — G0439 PR MEDICARE ANNUAL WELLNESS SUBSEQUENT VISIT: ICD-10-PCS | Mod: S$GLB,,, | Performed by: NURSE PRACTITIONER

## 2021-10-18 PROCEDURE — 1159F PR MEDICATION LIST DOCUMENTED IN MEDICAL RECORD: ICD-10-PCS | Mod: CPTII,S$GLB,, | Performed by: NURSE PRACTITIONER

## 2021-10-18 PROCEDURE — 1160F PR REVIEW ALL MEDS BY PRESCRIBER/CLIN PHARMACIST DOCUMENTED: ICD-10-PCS | Mod: CPTII,S$GLB,, | Performed by: NURSE PRACTITIONER

## 2021-10-18 PROCEDURE — 1160F RVW MEDS BY RX/DR IN RCRD: CPT | Mod: CPTII,S$GLB,, | Performed by: NURSE PRACTITIONER

## 2021-10-18 PROCEDURE — 1170F FXNL STATUS ASSESSED: CPT | Mod: CPTII,S$GLB,, | Performed by: NURSE PRACTITIONER

## 2021-10-18 PROCEDURE — 1101F PT FALLS ASSESS-DOCD LE1/YR: CPT | Mod: CPTII,S$GLB,, | Performed by: NURSE PRACTITIONER

## 2021-10-18 PROCEDURE — 1126F AMNT PAIN NOTED NONE PRSNT: CPT | Mod: CPTII,S$GLB,, | Performed by: NURSE PRACTITIONER

## 2021-10-18 PROCEDURE — 1101F PR PT FALLS ASSESS DOC 0-1 FALLS W/OUT INJ PAST YR: ICD-10-PCS | Mod: CPTII,S$GLB,, | Performed by: NURSE PRACTITIONER

## 2021-10-18 RX ORDER — DICLOFENAC SODIUM 10 MG/G
2 GEL TOPICAL 2 TIMES DAILY PRN
COMMUNITY
End: 2022-05-24

## 2021-10-25 ENCOUNTER — OFFICE VISIT (OUTPATIENT)
Dept: SPORTS MEDICINE | Facility: CLINIC | Age: 86
End: 2021-10-25
Payer: MEDICARE

## 2021-10-25 ENCOUNTER — PATIENT MESSAGE (OUTPATIENT)
Dept: OPHTHALMOLOGY | Facility: CLINIC | Age: 86
End: 2021-10-25
Payer: MEDICARE

## 2021-10-25 VITALS — WEIGHT: 130.06 LBS | BODY MASS INDEX: 20.41 KG/M2 | HEIGHT: 67 IN

## 2021-10-25 DIAGNOSIS — S69.92XD SCAPHOLUNATE LIGAMENT INJURY WITH NO INSTABILITY, LEFT, SUBSEQUENT ENCOUNTER: Primary | ICD-10-CM

## 2021-10-25 PROCEDURE — 99213 OFFICE O/P EST LOW 20 MIN: CPT | Mod: HCNC,S$GLB,, | Performed by: FAMILY MEDICINE

## 2021-10-25 PROCEDURE — 99213 PR OFFICE/OUTPT VISIT, EST, LEVL III, 20-29 MIN: ICD-10-PCS | Mod: HCNC,S$GLB,, | Performed by: FAMILY MEDICINE

## 2021-10-25 PROCEDURE — 1101F PR PT FALLS ASSESS DOC 0-1 FALLS W/OUT INJ PAST YR: ICD-10-PCS | Mod: HCNC,CPTII,S$GLB, | Performed by: FAMILY MEDICINE

## 2021-10-25 PROCEDURE — 1125F PR PAIN SEVERITY QUANTIFIED, PAIN PRESENT: ICD-10-PCS | Mod: HCNC,CPTII,S$GLB, | Performed by: FAMILY MEDICINE

## 2021-10-25 PROCEDURE — 1125F AMNT PAIN NOTED PAIN PRSNT: CPT | Mod: HCNC,CPTII,S$GLB, | Performed by: FAMILY MEDICINE

## 2021-10-25 PROCEDURE — 1159F PR MEDICATION LIST DOCUMENTED IN MEDICAL RECORD: ICD-10-PCS | Mod: HCNC,CPTII,S$GLB, | Performed by: FAMILY MEDICINE

## 2021-10-25 PROCEDURE — 99999 PR PBB SHADOW E&M-EST. PATIENT-LVL III: ICD-10-PCS | Mod: PBBFAC,HCNC,, | Performed by: FAMILY MEDICINE

## 2021-10-25 PROCEDURE — 3288F PR FALLS RISK ASSESSMENT DOCUMENTED: ICD-10-PCS | Mod: HCNC,CPTII,S$GLB, | Performed by: FAMILY MEDICINE

## 2021-10-25 PROCEDURE — 1159F MED LIST DOCD IN RCRD: CPT | Mod: HCNC,CPTII,S$GLB, | Performed by: FAMILY MEDICINE

## 2021-10-25 PROCEDURE — 1101F PT FALLS ASSESS-DOCD LE1/YR: CPT | Mod: HCNC,CPTII,S$GLB, | Performed by: FAMILY MEDICINE

## 2021-10-25 PROCEDURE — 3288F FALL RISK ASSESSMENT DOCD: CPT | Mod: HCNC,CPTII,S$GLB, | Performed by: FAMILY MEDICINE

## 2021-10-25 PROCEDURE — 99999 PR PBB SHADOW E&M-EST. PATIENT-LVL III: CPT | Mod: PBBFAC,HCNC,, | Performed by: FAMILY MEDICINE

## 2021-10-28 ENCOUNTER — OFFICE VISIT (OUTPATIENT)
Dept: OPHTHALMOLOGY | Facility: CLINIC | Age: 86
End: 2021-10-28
Payer: MEDICARE

## 2021-10-28 DIAGNOSIS — Z96.1 PSEUDOPHAKIA OF BOTH EYES: ICD-10-CM

## 2021-10-28 DIAGNOSIS — H02.401 PTOSIS OF RIGHT EYELID: ICD-10-CM

## 2021-10-28 DIAGNOSIS — H40.1132 PRIMARY OPEN ANGLE GLAUCOMA OF BOTH EYES, MODERATE STAGE: Primary | ICD-10-CM

## 2021-10-28 PROCEDURE — 92083 HUMPHREY VISUAL FIELD - OU - BOTH EYES: ICD-10-PCS | Mod: HCNC,S$GLB,, | Performed by: OPHTHALMOLOGY

## 2021-10-28 PROCEDURE — 92133 POSTERIOR SEGMENT OCT OPTIC NERVE(OCULAR COHERENCE TOMOGRAPHY) - OU - BOTH EYES: ICD-10-PCS | Mod: HCNC,S$GLB,, | Performed by: OPHTHALMOLOGY

## 2021-10-28 PROCEDURE — 1159F MED LIST DOCD IN RCRD: CPT | Mod: HCNC,CPTII,S$GLB, | Performed by: OPHTHALMOLOGY

## 2021-10-28 PROCEDURE — 99214 PR OFFICE/OUTPT VISIT, EST, LEVL IV, 30-39 MIN: ICD-10-PCS | Mod: HCNC,S$GLB,, | Performed by: OPHTHALMOLOGY

## 2021-10-28 PROCEDURE — 99999 PR PBB SHADOW E&M-EST. PATIENT-LVL III: CPT | Mod: PBBFAC,HCNC,, | Performed by: OPHTHALMOLOGY

## 2021-10-28 PROCEDURE — 1160F PR REVIEW ALL MEDS BY PRESCRIBER/CLIN PHARMACIST DOCUMENTED: ICD-10-PCS | Mod: HCNC,CPTII,S$GLB, | Performed by: OPHTHALMOLOGY

## 2021-10-28 PROCEDURE — 99214 OFFICE O/P EST MOD 30 MIN: CPT | Mod: HCNC,S$GLB,, | Performed by: OPHTHALMOLOGY

## 2021-10-28 PROCEDURE — 1159F PR MEDICATION LIST DOCUMENTED IN MEDICAL RECORD: ICD-10-PCS | Mod: HCNC,CPTII,S$GLB, | Performed by: OPHTHALMOLOGY

## 2021-10-28 PROCEDURE — 99999 PR PBB SHADOW E&M-EST. PATIENT-LVL III: ICD-10-PCS | Mod: PBBFAC,HCNC,, | Performed by: OPHTHALMOLOGY

## 2021-10-28 PROCEDURE — 92133 CPTRZD OPH DX IMG PST SGM ON: CPT | Mod: HCNC,S$GLB,, | Performed by: OPHTHALMOLOGY

## 2021-10-28 PROCEDURE — 92083 EXTENDED VISUAL FIELD XM: CPT | Mod: HCNC,S$GLB,, | Performed by: OPHTHALMOLOGY

## 2021-10-28 PROCEDURE — 1160F RVW MEDS BY RX/DR IN RCRD: CPT | Mod: HCNC,CPTII,S$GLB, | Performed by: OPHTHALMOLOGY

## 2021-11-12 ENCOUNTER — IMMUNIZATION (OUTPATIENT)
Dept: PHARMACY | Facility: CLINIC | Age: 86
End: 2021-11-12
Payer: MEDICARE

## 2021-11-12 DIAGNOSIS — Z23 NEED FOR VACCINATION: Primary | ICD-10-CM

## 2021-11-16 ENCOUNTER — PATIENT MESSAGE (OUTPATIENT)
Dept: OPHTHALMOLOGY | Facility: CLINIC | Age: 86
End: 2021-11-16
Payer: MEDICARE

## 2021-11-16 DIAGNOSIS — H40.1132 PRIMARY OPEN ANGLE GLAUCOMA OF BOTH EYES, MODERATE STAGE: ICD-10-CM

## 2021-11-16 RX ORDER — TIMOLOL MALEATE 5 MG/ML
1 SOLUTION/ DROPS OPHTHALMIC EVERY MORNING
Qty: 10 ML | Refills: 4 | Status: SHIPPED | OUTPATIENT
Start: 2021-11-16 | End: 2023-02-07

## 2021-12-02 ENCOUNTER — PATIENT MESSAGE (OUTPATIENT)
Dept: TRANSPLANT | Facility: CLINIC | Age: 86
End: 2021-12-02
Payer: MEDICARE

## 2021-12-07 DIAGNOSIS — I48.21 ATRIAL FIBRILLATION, PERMANENT: Primary | ICD-10-CM

## 2021-12-08 ENCOUNTER — TELEPHONE (OUTPATIENT)
Dept: ORTHOPEDICS | Facility: CLINIC | Age: 86
End: 2021-12-08
Payer: MEDICARE

## 2021-12-20 ENCOUNTER — PATIENT MESSAGE (OUTPATIENT)
Dept: INTERNAL MEDICINE | Facility: CLINIC | Age: 86
End: 2021-12-20
Payer: MEDICARE

## 2021-12-20 ENCOUNTER — TELEPHONE (OUTPATIENT)
Dept: INTERNAL MEDICINE | Facility: CLINIC | Age: 86
End: 2021-12-20
Payer: MEDICARE

## 2021-12-20 DIAGNOSIS — Z12.31 ENCOUNTER FOR SCREENING MAMMOGRAM FOR MALIGNANT NEOPLASM OF BREAST: Primary | ICD-10-CM

## 2021-12-22 ENCOUNTER — OFFICE VISIT (OUTPATIENT)
Dept: TRANSPLANT | Facility: CLINIC | Age: 86
End: 2021-12-22
Payer: MEDICARE

## 2021-12-22 VITALS
SYSTOLIC BLOOD PRESSURE: 140 MMHG | WEIGHT: 130.94 LBS | OXYGEN SATURATION: 92 % | HEIGHT: 67 IN | BODY MASS INDEX: 20.55 KG/M2 | DIASTOLIC BLOOD PRESSURE: 70 MMHG | HEART RATE: 82 BPM

## 2021-12-22 DIAGNOSIS — I48.21 ATRIAL FIBRILLATION, PERMANENT: Primary | Chronic | ICD-10-CM

## 2021-12-22 DIAGNOSIS — I70.0 ATHEROSCLEROSIS OF AORTA: ICD-10-CM

## 2021-12-22 PROCEDURE — 99999 PR PBB SHADOW E&M-EST. PATIENT-LVL IV: CPT | Mod: PBBFAC,HCNC,, | Performed by: NUCLEAR MEDICINE

## 2021-12-22 PROCEDURE — 1125F AMNT PAIN NOTED PAIN PRSNT: CPT | Mod: HCNC,CPTII,S$GLB, | Performed by: NUCLEAR MEDICINE

## 2021-12-22 PROCEDURE — 1101F PT FALLS ASSESS-DOCD LE1/YR: CPT | Mod: HCNC,CPTII,S$GLB, | Performed by: NUCLEAR MEDICINE

## 2021-12-22 PROCEDURE — 1125F PR PAIN SEVERITY QUANTIFIED, PAIN PRESENT: ICD-10-PCS | Mod: HCNC,CPTII,S$GLB, | Performed by: NUCLEAR MEDICINE

## 2021-12-22 PROCEDURE — 1101F PR PT FALLS ASSESS DOC 0-1 FALLS W/OUT INJ PAST YR: ICD-10-PCS | Mod: HCNC,CPTII,S$GLB, | Performed by: NUCLEAR MEDICINE

## 2021-12-22 PROCEDURE — 3288F PR FALLS RISK ASSESSMENT DOCUMENTED: ICD-10-PCS | Mod: HCNC,CPTII,S$GLB, | Performed by: NUCLEAR MEDICINE

## 2021-12-22 PROCEDURE — 3288F FALL RISK ASSESSMENT DOCD: CPT | Mod: HCNC,CPTII,S$GLB, | Performed by: NUCLEAR MEDICINE

## 2021-12-22 PROCEDURE — 1159F MED LIST DOCD IN RCRD: CPT | Mod: HCNC,CPTII,S$GLB, | Performed by: NUCLEAR MEDICINE

## 2021-12-22 PROCEDURE — 99999 PR PBB SHADOW E&M-EST. PATIENT-LVL IV: ICD-10-PCS | Mod: PBBFAC,HCNC,, | Performed by: NUCLEAR MEDICINE

## 2021-12-22 PROCEDURE — 1160F RVW MEDS BY RX/DR IN RCRD: CPT | Mod: HCNC,CPTII,S$GLB, | Performed by: NUCLEAR MEDICINE

## 2021-12-22 PROCEDURE — 99214 PR OFFICE/OUTPT VISIT, EST, LEVL IV, 30-39 MIN: ICD-10-PCS | Mod: HCNC,S$GLB,, | Performed by: NUCLEAR MEDICINE

## 2021-12-22 PROCEDURE — 1159F PR MEDICATION LIST DOCUMENTED IN MEDICAL RECORD: ICD-10-PCS | Mod: HCNC,CPTII,S$GLB, | Performed by: NUCLEAR MEDICINE

## 2021-12-22 PROCEDURE — 99214 OFFICE O/P EST MOD 30 MIN: CPT | Mod: HCNC,S$GLB,, | Performed by: NUCLEAR MEDICINE

## 2021-12-22 PROCEDURE — 1160F PR REVIEW ALL MEDS BY PRESCRIBER/CLIN PHARMACIST DOCUMENTED: ICD-10-PCS | Mod: HCNC,CPTII,S$GLB, | Performed by: NUCLEAR MEDICINE

## 2021-12-28 ENCOUNTER — PATIENT MESSAGE (OUTPATIENT)
Dept: CARDIOLOGY | Facility: CLINIC | Age: 86
End: 2021-12-28
Payer: MEDICARE

## 2022-01-18 ENCOUNTER — OFFICE VISIT (OUTPATIENT)
Dept: SPORTS MEDICINE | Facility: CLINIC | Age: 87
End: 2022-01-18
Payer: MEDICARE

## 2022-01-18 VITALS — WEIGHT: 130 LBS | HEIGHT: 67 IN | BODY MASS INDEX: 20.4 KG/M2

## 2022-01-18 DIAGNOSIS — M19.032 ARTHRITIS OF LEFT WRIST: Primary | ICD-10-CM

## 2022-01-18 DIAGNOSIS — M25.532 LEFT WRIST PAIN: ICD-10-CM

## 2022-01-18 DIAGNOSIS — S69.92XD SCAPHOLUNATE LIGAMENT INJURY WITH NO INSTABILITY, LEFT, SUBSEQUENT ENCOUNTER: ICD-10-CM

## 2022-01-18 PROCEDURE — 3288F FALL RISK ASSESSMENT DOCD: CPT | Mod: HCNC,CPTII,S$GLB, | Performed by: FAMILY MEDICINE

## 2022-01-18 PROCEDURE — 1125F PR PAIN SEVERITY QUANTIFIED, PAIN PRESENT: ICD-10-PCS | Mod: HCNC,CPTII,S$GLB, | Performed by: FAMILY MEDICINE

## 2022-01-18 PROCEDURE — 99999 PR PBB SHADOW E&M-EST. PATIENT-LVL III: ICD-10-PCS | Mod: PBBFAC,HCNC,, | Performed by: FAMILY MEDICINE

## 2022-01-18 PROCEDURE — 99214 OFFICE O/P EST MOD 30 MIN: CPT | Mod: HCNC,S$GLB,, | Performed by: FAMILY MEDICINE

## 2022-01-18 PROCEDURE — 1159F PR MEDICATION LIST DOCUMENTED IN MEDICAL RECORD: ICD-10-PCS | Mod: HCNC,CPTII,S$GLB, | Performed by: FAMILY MEDICINE

## 2022-01-18 PROCEDURE — 1101F PT FALLS ASSESS-DOCD LE1/YR: CPT | Mod: HCNC,CPTII,S$GLB, | Performed by: FAMILY MEDICINE

## 2022-01-18 PROCEDURE — 1159F MED LIST DOCD IN RCRD: CPT | Mod: HCNC,CPTII,S$GLB, | Performed by: FAMILY MEDICINE

## 2022-01-18 PROCEDURE — 1125F AMNT PAIN NOTED PAIN PRSNT: CPT | Mod: HCNC,CPTII,S$GLB, | Performed by: FAMILY MEDICINE

## 2022-01-18 PROCEDURE — 99999 PR PBB SHADOW E&M-EST. PATIENT-LVL III: CPT | Mod: PBBFAC,HCNC,, | Performed by: FAMILY MEDICINE

## 2022-01-18 PROCEDURE — 99214 PR OFFICE/OUTPT VISIT, EST, LEVL IV, 30-39 MIN: ICD-10-PCS | Mod: HCNC,S$GLB,, | Performed by: FAMILY MEDICINE

## 2022-01-18 PROCEDURE — 1101F PR PT FALLS ASSESS DOC 0-1 FALLS W/OUT INJ PAST YR: ICD-10-PCS | Mod: HCNC,CPTII,S$GLB, | Performed by: FAMILY MEDICINE

## 2022-01-18 PROCEDURE — 3288F PR FALLS RISK ASSESSMENT DOCUMENTED: ICD-10-PCS | Mod: HCNC,CPTII,S$GLB, | Performed by: FAMILY MEDICINE

## 2022-01-18 NOTE — PROGRESS NOTES
Subjective:     Patient ID: Destini Augustine is a 88 y.o. female.    Chief Complaint: Pain of the Left Wrist      HPI:  Long history of intermittent pains of the left wrist that she says only occurs with certain twisting and rotating movements and resolves quickly.  She thinks the left wrist splint is helpful and wears at night.    She does not require any oral medication for the intermittent pain.  She is able to perform ADLs as needed    We discussed working on leg strength and being less dependent on the arms to stand up from a chair.    Patient does not desire any additional workup for treatment and would like to come in just on a p.r.n. basis which is fine.    Past Medical History:   Diagnosis Date    Arthritis     knees, hands    Atrial fibrillation     Basal cell carcinoma     face, right thigh    COAG (chronic open-angle glaucoma) - Both Eyes 8/28/2013    Ex-smoker     Hamartoma     mandible    Macular degeneration     Osteopenia     7/14 shama 7/16    Pulmonary heart disease     Scapholunate ligament injury with no instability     Scoliosis 11/30/15    lumbar x-ray     Past Surgical History:   Procedure Laterality Date    APPENDECTOMY  1970s    CATARACT EXTRACTION Bilateral     DR. Singleton    hysterectomy  1970s    complete    HYSTERECTOMY      about 39yrs old, partial    MOUTH SURGERY Bilateral 02/10/2020    rectocele and cystocele  1998 approx    TONSILLECTOMY, ADENOIDECTOMY  5 y/o     Family History   Problem Relation Age of Onset    Cataracts Mother     Hypertension Mother     Stroke Mother     Cancer Father         colon    Thyroid disease Father     Cancer Brother         lung    Tuberculosis Brother     Blindness Neg Hx     Diabetes Neg Hx     Macular degeneration Neg Hx     Retinal detachment Neg Hx     Strabismus Neg Hx      Social History     Socioeconomic History    Marital status:     Number of children: 2   Tobacco Use    Smoking status: Former Smoker      Packs/day: 1.50     Years: 30.00     Pack years: 45.00     Quit date: 1980     Years since quittin.9    Smokeless tobacco: Never Used   Substance and Sexual Activity    Alcohol use: Not Currently    Drug use: No    Sexual activity: Never     Partners: Female   Social History Narrative    Lives alone, no pets or smokers in household.       Current Outpatient Medications:     alendronate (FOSAMAX) 70 MG tablet, TAKE 1 TAB WEEKLY IN THE MORNING WITH FULL GLASS OF WATER ON EMPTY STOMACH.DO NOT LIE DOWN FOR AT LEAST 30 MINUTES AFTER, Disp: 12 tablet, Rfl: 4    alendronate (FOSAMAX) 70 MG tablet, Take 1 tablet (70 mg total) by mouth every 7 days., Disp: 12 tablet, Rfl: 4    BIOTIN ORAL, Take 1 tablet by mouth 2 (two) times daily. Once daily per patient, Disp: , Rfl:     CALCIUM CARBONATE/VITAMIN D3 (CALCIUM 600 + D,3, ORAL), Take 1 capsule by mouth 2 (two) times daily., Disp: , Rfl:     diclofenac sodium (VOLTAREN) 1 % Gel, Apply 2 g topically 2 (two) times daily as needed (pain)., Disp: , Rfl:     DOCOSAHEXANOIC ACID/EPA (FISH OIL ORAL), Take 1 capsule by mouth 2 (two) times daily. , Disp: , Rfl:     ELIQUIS 2.5 mg Tab, TAKE 1 TABLET TWICE DAILY (Patient taking differently: once.), Disp: 180 tablet, Rfl: 3    GLUCOSAMINE HCL/CHONDRO COBB A (GLUCOSAMINE-CHONDROITIN ORAL), Take 1 tablet by mouth 2 (two) times daily. , Disp: , Rfl:     latanoprost 0.005 % ophthalmic solution, INSTILL 1 DROP INTO BOTH EYES EVERY NIGHT, Disp: 3 mL, Rfl: 12    metoprolol succinate (TOPROL-XL) 25 MG 24 hr tablet, TAKE 1 TABLET EVERY DAY, Disp: 90 tablet, Rfl: 3    multivitamin (THERAGRAN) per tablet, Take 1 tablet by mouth once daily., Disp: , Rfl:     timolol maleate 0.5% (TIMOPTIC) 0.5 % Drop, Place 1 drop into the right eye every morning., Disp: 10 mL, Rfl: 4    turmeric root extract 500 mg Cap, Take by mouth. Once daily per patient, Disp: , Rfl:     vitamin A-vitamin C-vit E-min Tab, Take 1 tablet by mouth Daily.,  Disp: , Rfl:   Review of patient's allergies indicates:   Allergen Reactions    Shellfish containing products Diarrhea and Nausea And Vomiting    Venom-wasp Itching and Swelling     Review of Systems   Constitutional: Negative for chills, fever and weight loss.   Respiratory: Negative for shortness of breath.    Cardiovascular: Negative for chest pain and palpitations.       Objective:   Body mass index is 20.36 kg/m².  There were no vitals filed for this visit.        Ortho/SPM Exam-alert and oriented pleasant ambulatory adult female no acute distress    Respiratory effort normal    Left hand and wrist-D IP joint changes consistent with osteoarthritis but nontender to touch    The wrist and the base of the 1st CMC will reveal some bony enlargement but patient does not have any tenderness to palpation or complain of pain in this area    Incidentally she has some similar changes in the right hand but less prominent    One we tried standing from a chair she is did have substantial weakness in the legs and I asked her to carefully with someone helping her work on standing from a chair without having to push off with her arms so much.    Neurovascular intact    Psychiatric good affect and cognition    Plan can use ice Tylenol or Voltaren gel if becomes more symptomatic and can obtain another appointment for recheck at any time she desires.    IMAGING: Posterior Segment OCT Optic Nerve- Both eyes  Right Eye  Quality was good. Scan locations included Retinal Nerve Fiber Layer   (RNFL).     Left Eye  Quality was good. Scan locations included Retinal Nerve Fiber Layer   (RNFL).     Findings  Right Eye  Progression has worsened.     Left Eye  Normal. Progression has been stable.     Notes  NFL thinning OD, mild progression  Max Visual Field - OU - Extended - Both Eyes  Right Eye  Reliability was borderline. Findings include superior arcuate defect.   Stable.     Left Eye  Reliability was borderline. Findings include  non-specific defects. Stable.        Radiographs / Imaging : Relevant imaging results reviewed by me and interpreted by me, discussed with the patient and / or family -x-rays reviewed by me and agree with report      Assessment:     Encounter Diagnoses   Name Primary?    Arthritis of left wrist Yes    Scapholunate ligament injury with no instability, left, subsequent encounter     Left wrist pain         Plan:   Arthritis of left wrist    Scapholunate ligament injury with no instability, left, subsequent encounter    Left wrist pain        The patient verbalized good understanding of the medical issues discussed today and expressed appreciation for the care provided.  Patient was given the opportunity to ask questions and be an active participant in their medical care. Patient had no further questions or concerns at this time.     The patient was encouraged to participate in appropriate physical activity.      Wes Ervin M.D.  [facility]        This note was dictated using voice recognition software and may have sound a like errors.

## 2022-01-18 NOTE — PATIENT INSTRUCTIONS
Continue splint on left wrist    Use Voltaren gel and ice to hands as needed    Recheck with us on as-needed basis    May work on standing up from a chair with minimal help from pushing off with the arms.

## 2022-01-25 ENCOUNTER — HOSPITAL ENCOUNTER (OUTPATIENT)
Dept: RADIOLOGY | Facility: HOSPITAL | Age: 87
Discharge: HOME | End: 2022-01-25
Attending: FAMILY MEDICINE
Payer: MEDICARE

## 2022-01-25 DIAGNOSIS — Z12.31 ENCOUNTER FOR SCREENING MAMMOGRAM FOR MALIGNANT NEOPLASM OF BREAST: ICD-10-CM

## 2022-01-25 PROCEDURE — 77067 SCR MAMMO BI INCL CAD: CPT | Mod: 26,HCNC,, | Performed by: RADIOLOGY

## 2022-01-25 PROCEDURE — 77063 BREAST TOMOSYNTHESIS BI: CPT | Mod: TC,HCNC

## 2022-01-25 PROCEDURE — 77063 BREAST TOMOSYNTHESIS BI: CPT | Mod: 26,HCNC,, | Performed by: RADIOLOGY

## 2022-01-25 PROCEDURE — 77067 SCR MAMMO BI INCL CAD: CPT | Mod: TC,HCNC

## 2022-01-25 PROCEDURE — 77063 MAMMO DIGITAL SCREENING BILAT WITH TOMO: ICD-10-PCS | Mod: 26,HCNC,, | Performed by: RADIOLOGY

## 2022-01-25 PROCEDURE — 77067 MAMMO DIGITAL SCREENING BILAT WITH TOMO: ICD-10-PCS | Mod: 26,HCNC,, | Performed by: RADIOLOGY

## 2022-01-31 ENCOUNTER — OFFICE VISIT (OUTPATIENT)
Dept: OPHTHALMOLOGY | Facility: CLINIC | Age: 87
End: 2022-01-31
Payer: MEDICARE

## 2022-01-31 DIAGNOSIS — H02.401 PTOSIS OF RIGHT EYELID: ICD-10-CM

## 2022-01-31 DIAGNOSIS — H40.1132 PRIMARY OPEN ANGLE GLAUCOMA OF BOTH EYES, MODERATE STAGE: Primary | ICD-10-CM

## 2022-01-31 DIAGNOSIS — H35.3131 EARLY DRY STAGE NONEXUDATIVE AGE-RELATED MACULAR DEGENERATION OF BOTH EYES: ICD-10-CM

## 2022-01-31 DIAGNOSIS — Z96.1 PSEUDOPHAKIA OF BOTH EYES: ICD-10-CM

## 2022-01-31 PROCEDURE — 1159F PR MEDICATION LIST DOCUMENTED IN MEDICAL RECORD: ICD-10-PCS | Mod: HCNC,CPTII,S$GLB, | Performed by: OPHTHALMOLOGY

## 2022-01-31 PROCEDURE — 1160F PR REVIEW ALL MEDS BY PRESCRIBER/CLIN PHARMACIST DOCUMENTED: ICD-10-PCS | Mod: HCNC,CPTII,S$GLB, | Performed by: OPHTHALMOLOGY

## 2022-01-31 PROCEDURE — 99999 PR PBB SHADOW E&M-EST. PATIENT-LVL III: ICD-10-PCS | Mod: PBBFAC,HCNC,, | Performed by: OPHTHALMOLOGY

## 2022-01-31 PROCEDURE — 99214 PR OFFICE/OUTPT VISIT, EST, LEVL IV, 30-39 MIN: ICD-10-PCS | Mod: HCNC,S$GLB,, | Performed by: OPHTHALMOLOGY

## 2022-01-31 PROCEDURE — 99999 PR PBB SHADOW E&M-EST. PATIENT-LVL III: CPT | Mod: PBBFAC,HCNC,, | Performed by: OPHTHALMOLOGY

## 2022-01-31 PROCEDURE — 1160F RVW MEDS BY RX/DR IN RCRD: CPT | Mod: HCNC,CPTII,S$GLB, | Performed by: OPHTHALMOLOGY

## 2022-01-31 PROCEDURE — 99214 OFFICE O/P EST MOD 30 MIN: CPT | Mod: HCNC,S$GLB,, | Performed by: OPHTHALMOLOGY

## 2022-01-31 PROCEDURE — 1159F MED LIST DOCD IN RCRD: CPT | Mod: HCNC,CPTII,S$GLB, | Performed by: OPHTHALMOLOGY

## 2022-01-31 NOTE — PROGRESS NOTES
SUBJECTIVE  Destini Augustine is 88 y.o. female  Uncorrected distance visual acuity was 20/30 -2 in the right eye and 20/25 -2 in the left eye.   Chief Complaint   Patient presents with    Glaucoma     Patient reports for 3 month IOP check. Using gtts as advised. Denies pain or irritation. Va stable.          HPI     Glaucoma      Additional comments: Patient reports for 3 month IOP check. Using gtts   as advised. Denies pain or irritation. Va stable.              Comments     1. Mod COAG OD>OS (Initial 30/26) goal = 17   2. PCIOL OD w/ ECP & Toric IOL 5/20/09   PCIOL OS w/ ECP & Toric IOL 4/22/09   3. Mild Dry AMD   4. PCO OS   5. Pseudophakia OU      Latanoprost QHS OU   Timolol BID OD     Ocuvite   HAG (does not do)   O3FO  WC/LS            Last edited by Tony Watts on 1/31/2022  2:32 PM. (History)         Assessment /Plan :  1. Primary open angle glaucoma of both eyes, moderate stage Doing well, IOP within acceptable range relative to target IOP and no evidence of progression. Continue current treatment. Reviewed importance of continued compliance with treatment and follow up.      Patient instructed to continue using the following glaucoma medication as follows:  Latanoprost one drop in each eye nightly and Timolol one drop in right eye every 12 hours    Return to clinic in 3 months  or as needed.  With IOP Check and GOCT     2. Pseudophakia of both eyes  -- Condition stable, no therapeutic change required. Monitoring routinely.   3. Ptosis of right eyelid  -- Condition stable, no therapeutic change required. Monitoring routinely.   4. 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 Monitoring routinely.

## 2022-02-01 ENCOUNTER — PATIENT MESSAGE (OUTPATIENT)
Dept: INTERNAL MEDICINE | Facility: CLINIC | Age: 87
End: 2022-02-01
Payer: MEDICARE

## 2022-02-14 ENCOUNTER — OFFICE VISIT (OUTPATIENT)
Dept: INTERNAL MEDICINE | Facility: CLINIC | Age: 87
End: 2022-02-14
Payer: MEDICARE

## 2022-02-14 ENCOUNTER — OFFICE VISIT (OUTPATIENT)
Dept: OTOLARYNGOLOGY | Facility: CLINIC | Age: 87
End: 2022-02-14
Payer: MEDICARE

## 2022-02-14 VITALS
DIASTOLIC BLOOD PRESSURE: 82 MMHG | WEIGHT: 129 LBS | OXYGEN SATURATION: 97 % | HEART RATE: 104 BPM | HEIGHT: 67 IN | BODY MASS INDEX: 20.25 KG/M2 | SYSTOLIC BLOOD PRESSURE: 124 MMHG | TEMPERATURE: 98 F

## 2022-02-14 VITALS — BODY MASS INDEX: 20.09 KG/M2 | WEIGHT: 128 LBS | HEIGHT: 67 IN

## 2022-02-14 DIAGNOSIS — H61.20 IMPACTED CERUMEN, UNSPECIFIED LATERALITY: ICD-10-CM

## 2022-02-14 DIAGNOSIS — I70.0 ATHEROSCLEROSIS OF AORTA: ICD-10-CM

## 2022-02-14 DIAGNOSIS — H61.23 BILATERAL IMPACTED CERUMEN: ICD-10-CM

## 2022-02-14 DIAGNOSIS — Z79.01 ANTICOAGULATED: Primary | ICD-10-CM

## 2022-02-14 DIAGNOSIS — I48.21 ATRIAL FIBRILLATION, PERMANENT: ICD-10-CM

## 2022-02-14 DIAGNOSIS — M85.89 OSTEOPENIA OF MULTIPLE SITES: ICD-10-CM

## 2022-02-14 DIAGNOSIS — I27.9 PULMONARY HEART DISEASE: ICD-10-CM

## 2022-02-14 PROCEDURE — 3288F PR FALLS RISK ASSESSMENT DOCUMENTED: ICD-10-PCS | Mod: HCNC,CPTII,S$GLB, | Performed by: FAMILY MEDICINE

## 2022-02-14 PROCEDURE — 69210 PR REMOVAL IMPACTED CERUMEN REQUIRING INSTRUMENTATION, UNILATERAL: ICD-10-PCS | Mod: HCNC,S$GLB,, | Performed by: OTOLARYNGOLOGY

## 2022-02-14 PROCEDURE — 1101F PT FALLS ASSESS-DOCD LE1/YR: CPT | Mod: HCNC,CPTII,S$GLB, | Performed by: FAMILY MEDICINE

## 2022-02-14 PROCEDURE — 1101F PR PT FALLS ASSESS DOC 0-1 FALLS W/OUT INJ PAST YR: ICD-10-PCS | Mod: HCNC,CPTII,S$GLB, | Performed by: OTOLARYNGOLOGY

## 2022-02-14 PROCEDURE — 1126F AMNT PAIN NOTED NONE PRSNT: CPT | Mod: HCNC,CPTII,S$GLB, | Performed by: OTOLARYNGOLOGY

## 2022-02-14 PROCEDURE — 3288F PR FALLS RISK ASSESSMENT DOCUMENTED: ICD-10-PCS | Mod: HCNC,CPTII,S$GLB, | Performed by: OTOLARYNGOLOGY

## 2022-02-14 PROCEDURE — 3288F FALL RISK ASSESSMENT DOCD: CPT | Mod: HCNC,CPTII,S$GLB, | Performed by: OTOLARYNGOLOGY

## 2022-02-14 PROCEDURE — 99499 UNLISTED E&M SERVICE: CPT | Mod: S$GLB,,, | Performed by: FAMILY MEDICINE

## 2022-02-14 PROCEDURE — 1160F PR REVIEW ALL MEDS BY PRESCRIBER/CLIN PHARMACIST DOCUMENTED: ICD-10-PCS | Mod: HCNC,CPTII,S$GLB, | Performed by: OTOLARYNGOLOGY

## 2022-02-14 PROCEDURE — 99999 PR PBB SHADOW E&M-EST. PATIENT-LVL IV: CPT | Mod: PBBFAC,HCNC,, | Performed by: FAMILY MEDICINE

## 2022-02-14 PROCEDURE — 99999 PR PBB SHADOW E&M-EST. PATIENT-LVL III: ICD-10-PCS | Mod: PBBFAC,HCNC,, | Performed by: OTOLARYNGOLOGY

## 2022-02-14 PROCEDURE — 69210 REMOVE IMPACTED EAR WAX UNI: CPT | Mod: HCNC,S$GLB,, | Performed by: OTOLARYNGOLOGY

## 2022-02-14 PROCEDURE — 1159F MED LIST DOCD IN RCRD: CPT | Mod: HCNC,CPTII,S$GLB, | Performed by: OTOLARYNGOLOGY

## 2022-02-14 PROCEDURE — 99499 RISK ADDL DX/OHS AUDIT: ICD-10-PCS | Mod: S$GLB,,, | Performed by: FAMILY MEDICINE

## 2022-02-14 PROCEDURE — 1101F PR PT FALLS ASSESS DOC 0-1 FALLS W/OUT INJ PAST YR: ICD-10-PCS | Mod: HCNC,CPTII,S$GLB, | Performed by: FAMILY MEDICINE

## 2022-02-14 PROCEDURE — 1159F PR MEDICATION LIST DOCUMENTED IN MEDICAL RECORD: ICD-10-PCS | Mod: HCNC,CPTII,S$GLB, | Performed by: FAMILY MEDICINE

## 2022-02-14 PROCEDURE — 1126F PR PAIN SEVERITY QUANTIFIED, NO PAIN PRESENT: ICD-10-PCS | Mod: HCNC,CPTII,S$GLB, | Performed by: OTOLARYNGOLOGY

## 2022-02-14 PROCEDURE — 99214 OFFICE O/P EST MOD 30 MIN: CPT | Mod: HCNC,S$GLB,, | Performed by: FAMILY MEDICINE

## 2022-02-14 PROCEDURE — 99499 UNLISTED E&M SERVICE: CPT | Mod: HCNC,S$GLB,, | Performed by: OTOLARYNGOLOGY

## 2022-02-14 PROCEDURE — 1101F PT FALLS ASSESS-DOCD LE1/YR: CPT | Mod: HCNC,CPTII,S$GLB, | Performed by: OTOLARYNGOLOGY

## 2022-02-14 PROCEDURE — 1160F RVW MEDS BY RX/DR IN RCRD: CPT | Mod: HCNC,CPTII,S$GLB, | Performed by: OTOLARYNGOLOGY

## 2022-02-14 PROCEDURE — 99499 NO LOS: ICD-10-PCS | Mod: HCNC,S$GLB,, | Performed by: OTOLARYNGOLOGY

## 2022-02-14 PROCEDURE — 3288F FALL RISK ASSESSMENT DOCD: CPT | Mod: HCNC,CPTII,S$GLB, | Performed by: FAMILY MEDICINE

## 2022-02-14 PROCEDURE — 99214 PR OFFICE/OUTPT VISIT, EST, LEVL IV, 30-39 MIN: ICD-10-PCS | Mod: HCNC,S$GLB,, | Performed by: FAMILY MEDICINE

## 2022-02-14 PROCEDURE — 1159F MED LIST DOCD IN RCRD: CPT | Mod: HCNC,CPTII,S$GLB, | Performed by: FAMILY MEDICINE

## 2022-02-14 PROCEDURE — 1126F AMNT PAIN NOTED NONE PRSNT: CPT | Mod: HCNC,CPTII,S$GLB, | Performed by: FAMILY MEDICINE

## 2022-02-14 PROCEDURE — 1159F PR MEDICATION LIST DOCUMENTED IN MEDICAL RECORD: ICD-10-PCS | Mod: HCNC,CPTII,S$GLB, | Performed by: OTOLARYNGOLOGY

## 2022-02-14 PROCEDURE — 99999 PR PBB SHADOW E&M-EST. PATIENT-LVL IV: ICD-10-PCS | Mod: PBBFAC,HCNC,, | Performed by: FAMILY MEDICINE

## 2022-02-14 PROCEDURE — 99999 PR PBB SHADOW E&M-EST. PATIENT-LVL III: CPT | Mod: PBBFAC,HCNC,, | Performed by: OTOLARYNGOLOGY

## 2022-02-14 PROCEDURE — 1126F PR PAIN SEVERITY QUANTIFIED, NO PAIN PRESENT: ICD-10-PCS | Mod: HCNC,CPTII,S$GLB, | Performed by: FAMILY MEDICINE

## 2022-02-14 NOTE — PROGRESS NOTES
Subjective:       Patient ID: Destini Augustine is afemale.    Chief Complaint: Multiple issues see below  HPIafib anticoag utd card  Osteopenia  Fosamax due shama fall 23    Couple months asymt lump left groin    Past Medical History:   Diagnosis Date    Arthritis     knees, hands    Atrial fibrillation     Basal cell carcinoma     face, right thigh    COAG (chronic open-angle glaucoma) - Both Eyes 8/28/2013    Ex-smoker     Hamartoma     mandible    Macular degeneration     Osteopenia     7/14 shama 7/16    Scoliosis 11/30/15    lumbar x-ray     Past Surgical History:   Procedure Laterality Date    APPENDECTOMY  1970s    CATARACT EXTRACTION Bilateral     DR. Singleton    hysterectomy  1970s    complete    HYSTERECTOMY      about 39yrs old, partial    rectocele and cystocele  1998 approx    TONSILLECTOMY, ADENOIDECTOMY  7 y/o     Family History   Problem Relation Age of Onset    Cataracts Mother     Hypertension Mother     Stroke Mother     Cancer Father      colon    Thyroid disease Father     Cancer Brother      lung    Tuberculosis Brother     Blindness Neg Hx     Diabetes Neg Hx     Macular degeneration Neg Hx     Retinal detachment Neg Hx     Strabismus Neg Hx      Social History     Social History    Marital status:      Spouse name: N/A    Number of children: 2    Years of education: N/A     Social History Main Topics    Smoking status: Former Smoker     Packs/day: 1.50     Years: 30.00     Quit date: 2/11/1980    Smokeless tobacco: Never Used    Alcohol use Yes      Comment: 2 drinks/ month    Drug use: No    Sexual activity: No     Other Topics Concern    None     Social History Narrative    None       Review of Systems  Cardiovascular: no chest pain  Chest: no shortness of breath  Abd: no abd pain  Remainder review of systems negative    Objective:      Physical Exam   Constitutional: She is oriented to person, place, and time. She appears well-developed and  well-nourished. No distress.   HENT:   Head: Atraumatic.   Nose: Nose normal.   Mouth/Throat: Oropharynx is clear and moist. No oropharyngeal exudate.   bilat ci  Eyes: Conjunctivae and EOM are normal. Pupils are equal, round, and reactive to light. No scleral icterus.   Neck: Normal range of motion. Neck supple. No thyromegaly present.   Cardiovascular: Normal rate, regular rhythm and normal heart sounds.    No murmur heard.  Pulmonary/Chest: Effort normal and breath sounds normal. No respiratory distress. She has no wheezes. She has no rales.   Abdominal: Soft. Bowel sounds are normal. She exhibits no distension and no mass. There is no hepatosplenomegaly. There is no tenderness. There is no rebound and no guarding.   Musculoskeletal: Normal range of motion. She exhibits no edema or tenderness.   Lymphadenopathy:     She has no cervical adenopathy.   Neurological: She is alert and oriented to person, place, and time. No cranial nerve deficit. She exhibits normal muscle tone. Coordination normal.   Skin: Skin is warm. No rash noted. No erythema. No pallor.   Psychiatric: She has a normal mood and affect. Her behavior is normal. Judgment and thought content normal.   Nursing note and vitals reviewed.        Left groin: nurse chaperone present lft ing line .5cm mobile subcut cyst w open comedone.   Assessment:           2. Anticoagulated      afib  osteopnia  bilat ci  Plan:       **f/u card when due  Lab and follow up after in 6 months robson; annual f/u after that ok if stable  Due dexa fall 23    Reassurance skin cyst if enlarges then derm    Anticoagulated  -     CBC Auto Differential; Future; Expected date: 08/13/2022    Atrial fibrillation, permanent  -     Comprehensive Metabolic Panel; Future; Expected date: 08/13/2022    Atherosclerosis of aorta    Pulmonary heart disease    Osteopenia of multiple sites    Impacted cerumen, unspecified laterality  -     Ambulatory referral/consult to ENT; Future; Expected date:  02/21/2022

## 2022-02-14 NOTE — PROGRESS NOTES
REFERRING PROVIDER  Glenn Love Md  [address] [facility]  [place]  [place] [address]  Subjective:   Patient: Destini Augustine [id number], :3/29/1933   Visit date:2022 3:23 PM    Chief Complaint:  Cerumen Impaction        HPI:     Destini Augustine is a 88 y.o. female whom I am asked to see for evaluation of otalgia or hearing loss in both ears for the past 1-4 weeks.   Destini rates the severity as moderate.  No exacerbating or relieving factors.  There is no drainage from the ears.      Her meds, allergies, medical, surgical, social & family histories were reviewed & updated:  -     She has a current medication list which includes the following prescription(s): alendronate, biotin, calcium carbonate/vitamin d3, diclofenac sodium, docosahexaenoic acid/epa, eliquis, glucosamine hcl/chondroitin portillo, latanoprost, metoprolol succinate, multivitamin, timolol maleate 0.5%, turmeric root extract, and vitamin a-vitamin c-vit e-min.  -     She  has a past medical history of Arthritis, Atrial fibrillation, Basal cell carcinoma, COAG (chronic open-angle glaucoma) - Both Eyes (2013), Ex-smoker, Hamartoma, Macular degeneration, Osteopenia, Pulmonary heart disease, Scapholunate ligament injury with no instability, and Scoliosis (11/30/15).   -     She does not have any pertinent problems on file.   -     She  has a past surgical history that includes Cataract extraction (Bilateral); rectocele and cystocele (1998); TONSILLECTOMY, ADENOIDECTOMY (5 y/o); Appendectomy (); hysterectomy (); Hysterectomy; and Mouth surgery (Bilateral, 02/10/2020).  -     She  reports that she quit smoking about 42 years ago. She has a 45.00 pack-year smoking history. She has never used smokeless tobacco. She reports previous alcohol use. She reports that she does not use drugs.  -     Her family history includes Cancer in her brother and father; Cataracts in her mother; Hypertension in her mother; Stroke in her mother; Thyroid disease  "in her father; Tuberculosis in her brother.  -     She is allergic to shellfish containing products and venom-wasp.      Review of Systems:  -     Allergic/Immunologic: is allergic to shellfish containing products and venom-wasp..  -     Constitutional: Current temp:          Objective:     Physical Exam:  Vitals:  Ht 5' 7" (1.702 m)   Wt 58.1 kg (128 lb)   BMI 20.05 kg/m²   Communication:  Able to communicate, no hoarseness.  Head & Face:  Normocephalic, atraumatic, no sinus tenderness.  Eyes:  Extraocular motions intact.  Ears:  Otoscopy of external auditory canals reveals impaction of bilateral ear canals.  With the patient in the supine position, we used the operating microscope to examine both ears with the appropriate sized ear speculum.  A variety of sterile, micro-instruments were utilized to remove the cerumen atraumatically from the impacted ear(s).   After removal, the ears were reexamined-  Right Ear:  No mass/lesion of auricle. The external auditory canals is without erythema or discharge. Pneumatic otoscopy of the tympanic membrane revealed no perforation and good mobility, with no fluid in middle ear. Clinical speech reception thresholds grossly normal.  Left Ear:  No mass/lesion of auricle. The external auditory canals is without erythema or discharge. Pneumatic otoscopy of the tympanic membrane revealed no perforation and good mobility, with no fluid in middle ear. Clinical speech reception thresholds grossly normal  Nose:  No masses/lesions of external nose, nasal mucosa, septum, and turbinates were within normal limits.  Mouth:  No mass/lesion of lips, teeth, gums, hard/soft palate, tongue, tonsils, or oropharynx.  Neck & Lymphatics:  No cervical lymphadenopathy, no neck mass/crepitus/ asymmetry, trachea is midline, no thyroid enlargement/tenderness/mass.  Neuro/Psych: Alert with normal mood and affect.   Respiration/Chest:  Symmetric expansion during respiration, normal respiratory " effort.  Skin:  Warm and intact.    Assessment & Plan:       -     Cerumen Impaction - Destini has cerumen impaction.  We discussed preventative measures and treatment options.  Q-tips must be avoided, instead the ears can be cleaned with OTC ear rinses (or a mixture of alcohol & vinegar in equal parts).   For hard wax, Destini may place mineral oil/baby oil in the ear with a cotton ball at night and remove in the shower.  This will assist in softening the wax and allow it to drain out on its own. If the cerumen impacts the ear canal and causes hearing loss or infection she needs to follow-up in the clinic for treatment and cleaning.

## 2022-02-14 NOTE — PATIENT INSTRUCTIONS
Earwax (Treated)    Everyone produces earwax from the lining of the ear canal. It lubricates and protects the ear. The wax that forms in the canal slowly moves toward the outside of the ear and falls out. Sometimes wax can build up in the ear canal. This can cause a blockage and loss of hearing. A buildup of earwax was removed from your ear today.  Home care  If you have a tendency to build up wax in the ear canal, you should clear the wax at home regularly, before it causes discomfort. This should be about once every six months.  · Unless a medicine was prescribed, you may use an over-the-counter product made for clearing earwax. These contain carbamide peroxide and are available over-the-counter in a kit with a small bulb syringe.  · Lie down with the blocked ear facing upward. Apply one dropper full of medicine and wait a few minutes. Grasp the outer ear and wiggle it to help the solution enter the canal.  · Lean over a sink or basin with the blocked ear turned downward. Use a rubber bulb syringe filled with warm (not hot or cold) water to rinse the ear several times. Use gentle pressure only. You may need to repeat the irrigation several times before the wax flows out.  · If you are having trouble draining all the water out of your ear canal, put a few drops of rubbing alcohol into the ear canal. This will help remove the remaining water.  Don'ts  · Dont use cold water to rinse the ear. This will make you dizzy.  · Dont do this procedure if you have an ear infection. Symptoms include ear pain, fever, or fluid draining from the ear.  · Dont do this procedure if you have a punctured eardrum.  · Dont use cotton swabs, matches, hairpins, keys, or other objects to clean the ear canal. This can cause infection of the ear canal or rupture of the eardrum. Because of their size and shape, cotton swabs can push the earwax deeper into the ear canal instead of removing it.  Follow-up care  Follow up with your  healthcare provider, or as advised.  When to seek medical advice  Call your healthcare provider right away if any of these occur:  · Worsening ear pain  · Fever of 100.4°F (38°C) or higher, or as directed by your healthcare provider  · Hearing does not return to normal after three days of treatment  · Fluid drainage or bleeding from the ear canal  · Swelling, redness, or tenderness of the outer ear  · Headache, neck pain, or stiff neck  Date Last Reviewed: 3/22/2015  © 3602-4945 [employer]. [address] [place] [address]. All rights reserved. This information is not intended as a substitute for professional medical care. Always follow your healthcare professional's instructions.

## 2022-05-01 ENCOUNTER — NURSE TRIAGE (OUTPATIENT)
Dept: ADMINISTRATIVE | Facility: CLINIC | Age: 87
End: 2022-05-01
Payer: MEDICARE

## 2022-05-01 ENCOUNTER — PATIENT OUTREACH (OUTPATIENT)
Dept: ADMINISTRATIVE | Facility: OTHER | Age: 87
End: 2022-05-01
Payer: MEDICARE

## 2022-05-01 NOTE — TELEPHONE ENCOUNTER
Reason for Disposition   Health Information question, no triage required and triager able to answer question   Nursing judgment    Protocols used: INFORMATION ONLY CALL - NO TRIAGE--, NO GUIDELINE OR REFERENCE [id number]    Pt stated she is cold and has chills. Stated she is a pt of Dr. Love. Denies any other symptoms. Pt asked if that means she has COVID. Pt advised chills could indicate fever or several health issues. Pt stated she has a thermometer but refused to check her temp when asked. Pt advised to have someone bring her to Urgent Care for evaluation. Advised a message will be sent to Dr. Love requesting follow up when the office opens tomorrow. Pt seems dismissive and eager to end the call.

## 2022-05-02 ENCOUNTER — OFFICE VISIT (OUTPATIENT)
Dept: OPHTHALMOLOGY | Facility: CLINIC | Age: 87
End: 2022-05-02
Payer: MEDICARE

## 2022-05-02 DIAGNOSIS — Z96.1 PSEUDOPHAKIA OF BOTH EYES: ICD-10-CM

## 2022-05-02 DIAGNOSIS — H40.1132 PRIMARY OPEN ANGLE GLAUCOMA OF BOTH EYES, MODERATE STAGE: Primary | ICD-10-CM

## 2022-05-02 DIAGNOSIS — H02.401 PTOSIS OF RIGHT EYELID: ICD-10-CM

## 2022-05-02 DIAGNOSIS — H04.129 DRY EYE: ICD-10-CM

## 2022-05-02 PROCEDURE — 92133 CPTRZD OPH DX IMG PST SGM ON: CPT | Mod: S$GLB,,, | Performed by: OPHTHALMOLOGY

## 2022-05-02 PROCEDURE — 99214 PR OFFICE/OUTPT VISIT, EST, LEVL IV, 30-39 MIN: ICD-10-PCS | Mod: S$GLB,,, | Performed by: OPHTHALMOLOGY

## 2022-05-02 PROCEDURE — 99999 PR PBB SHADOW E&M-EST. PATIENT-LVL III: CPT | Mod: PBBFAC,,, | Performed by: OPHTHALMOLOGY

## 2022-05-02 PROCEDURE — 99214 OFFICE O/P EST MOD 30 MIN: CPT | Mod: S$GLB,,, | Performed by: OPHTHALMOLOGY

## 2022-05-02 PROCEDURE — 92133 POSTERIOR SEGMENT OCT OPTIC NERVE(OCULAR COHERENCE TOMOGRAPHY) - OU - BOTH EYES: ICD-10-PCS | Mod: S$GLB,,, | Performed by: OPHTHALMOLOGY

## 2022-05-02 PROCEDURE — 99999 PR PBB SHADOW E&M-EST. PATIENT-LVL III: ICD-10-PCS | Mod: PBBFAC,,, | Performed by: OPHTHALMOLOGY

## 2022-05-02 NOTE — TELEPHONE ENCOUNTER
Tried to contact patient, was unsuccessful. The phone did not give me an option to LVM    SNT

## 2022-05-02 NOTE — PROGRESS NOTES
SUBJECTIVE  Destini Augustine is 89 y.o. female  Uncorrected distance visual acuity was 20/30 -2 in the right eye and 20/40 +1 in the left eye.   Chief Complaint   Patient presents with    Glaucoma     Pt reports for 3m IOP check with GOCT. Denies any pain or irritation. Va stable. 100% compliant with gtts.           HPI     Glaucoma      Additional comments: Pt reports for 3m IOP check with GOCT. Denies any   pain or irritation. Va stable. 100% compliant with gtts.               Comments     1. Mod COAG OD>OS (Initial 30/26) goal = 17   2. PCIOL OD w/ ECP & Toric IOL 5/20/09   PCIOL OS w/ ECP & Toric IOL 4/22/09   3. Mild Dry AMD   4. PCO OS   5. Pseudophakia OU      Latanoprost QHS OU   Timolol BID OD     Ocuvite   HAG (does not do)   O3FO  WC/LS            Last edited by Benjie Daniel on 5/2/2022 11:02 AM. (History)         Assessment /Plan :  1. Primary open angle glaucoma of both eyes, moderate stage Doing well, IOP within acceptable range relative to target IOP and no evidence of progression. Continue current treatment. Reviewed importance of continued compliance with treatment and follow up.      Patient instructed to continue using the following glaucoma medication as follows:  Latanoprost one drop in each eye nightly and Timolol one drop in the right eye every 12 hours    Return to clinic in 4 months  or as needed.  With IOP Check and HVF 24-2       2. Pseudophakia of both eyes  -- Condition stable, no therapeutic change required. Monitoring routinely.     3. Ptosis of right eyelid - monitor for now   4.      Dry Eyes - recommend artificial tears one drop in each eye as needed

## 2022-05-02 NOTE — PROGRESS NOTES
Health Maintenance Due   Topic Date Due    COVID-19 Vaccine (4 - Booster for Pfizer series) 03/12/2022     Updates were requested from care everywhere.  Chart was reviewed for overdue Proactive [facility] Encounters (AYSHA) topics (CRS, Breast Cancer Screening, Eye exam)  Health Maintenance has been updated.  LINKS immunization registry triggered.  Immunizations were reconciled.

## 2022-05-04 DIAGNOSIS — I48.19 ATRIAL FIBRILLATION, PERSISTENT: ICD-10-CM

## 2022-05-04 RX ORDER — METOPROLOL SUCCINATE 25 MG/1
25 TABLET, EXTENDED RELEASE ORAL DAILY
Qty: 90 TABLET | Refills: 3 | OUTPATIENT
Start: 2022-05-04

## 2022-05-10 ENCOUNTER — PATIENT MESSAGE (OUTPATIENT)
Dept: CARDIOLOGY | Facility: CLINIC | Age: 87
End: 2022-05-10
Payer: MEDICARE

## 2022-05-24 ENCOUNTER — HOSPITAL ENCOUNTER (OUTPATIENT)
Dept: CARDIOLOGY | Facility: HOSPITAL | Age: 87
Discharge: HOME | End: 2022-05-24
Payer: MEDICARE

## 2022-05-24 ENCOUNTER — OFFICE VISIT (OUTPATIENT)
Dept: CARDIOLOGY | Facility: CLINIC | Age: 87
End: 2022-05-24
Payer: MEDICARE

## 2022-05-24 VITALS
BODY MASS INDEX: 20.35 KG/M2 | HEART RATE: 75 BPM | OXYGEN SATURATION: 98 % | WEIGHT: 129.63 LBS | SYSTOLIC BLOOD PRESSURE: 140 MMHG | HEIGHT: 67 IN | DIASTOLIC BLOOD PRESSURE: 80 MMHG

## 2022-05-24 DIAGNOSIS — I48.21 ATRIAL FIBRILLATION, PERMANENT: Primary | Chronic | ICD-10-CM

## 2022-05-24 DIAGNOSIS — Z79.01 ANTICOAGULATED: ICD-10-CM

## 2022-05-24 DIAGNOSIS — R94.31 ABNORMAL ECG: ICD-10-CM

## 2022-05-24 DIAGNOSIS — I36.1 NONRHEUMATIC TRICUSPID VALVE REGURGITATION: ICD-10-CM

## 2022-05-24 DIAGNOSIS — I48.21 ATRIAL FIBRILLATION, PERMANENT: ICD-10-CM

## 2022-05-24 DIAGNOSIS — I34.0 NONRHEUMATIC MITRAL VALVE REGURGITATION: ICD-10-CM

## 2022-05-24 DIAGNOSIS — I48.19 ATRIAL FIBRILLATION, PERSISTENT: ICD-10-CM

## 2022-05-24 DIAGNOSIS — I27.20 PULMONARY HYPERTENSION: ICD-10-CM

## 2022-05-24 PROCEDURE — 99499 UNLISTED E&M SERVICE: CPT | Mod: ,,, | Performed by: INTERNAL MEDICINE

## 2022-05-24 PROCEDURE — 3288F FALL RISK ASSESSMENT DOCD: CPT | Mod: CPTII,S$GLB,, | Performed by: INTERNAL MEDICINE

## 2022-05-24 PROCEDURE — 1159F PR MEDICATION LIST DOCUMENTED IN MEDICAL RECORD: ICD-10-PCS | Mod: CPTII,S$GLB,, | Performed by: INTERNAL MEDICINE

## 2022-05-24 PROCEDURE — 1101F PR PT FALLS ASSESS DOC 0-1 FALLS W/OUT INJ PAST YR: ICD-10-PCS | Mod: CPTII,S$GLB,, | Performed by: INTERNAL MEDICINE

## 2022-05-24 PROCEDURE — 1126F PR PAIN SEVERITY QUANTIFIED, NO PAIN PRESENT: ICD-10-PCS | Mod: CPTII,S$GLB,, | Performed by: INTERNAL MEDICINE

## 2022-05-24 PROCEDURE — 1159F MED LIST DOCD IN RCRD: CPT | Mod: CPTII,S$GLB,, | Performed by: INTERNAL MEDICINE

## 2022-05-24 PROCEDURE — 99999 PR PBB SHADOW E&M-EST. PATIENT-LVL III: ICD-10-PCS | Mod: PBBFAC,,, | Performed by: INTERNAL MEDICINE

## 2022-05-24 PROCEDURE — 99214 PR OFFICE/OUTPT VISIT, EST, LEVL IV, 30-39 MIN: ICD-10-PCS | Mod: S$GLB,,, | Performed by: INTERNAL MEDICINE

## 2022-05-24 PROCEDURE — 1126F AMNT PAIN NOTED NONE PRSNT: CPT | Mod: CPTII,S$GLB,, | Performed by: INTERNAL MEDICINE

## 2022-05-24 PROCEDURE — 1160F RVW MEDS BY RX/DR IN RCRD: CPT | Mod: CPTII,S$GLB,, | Performed by: INTERNAL MEDICINE

## 2022-05-24 PROCEDURE — 93010 EKG 12-LEAD: ICD-10-PCS | Mod: ,,, | Performed by: INTERNAL MEDICINE

## 2022-05-24 PROCEDURE — 99214 OFFICE O/P EST MOD 30 MIN: CPT | Mod: S$GLB,,, | Performed by: INTERNAL MEDICINE

## 2022-05-24 PROCEDURE — 3288F PR FALLS RISK ASSESSMENT DOCUMENTED: ICD-10-PCS | Mod: CPTII,S$GLB,, | Performed by: INTERNAL MEDICINE

## 2022-05-24 PROCEDURE — 99499 RISK ADDL DX/OHS AUDIT: ICD-10-PCS | Mod: ,,, | Performed by: INTERNAL MEDICINE

## 2022-05-24 PROCEDURE — 93005 ELECTROCARDIOGRAM TRACING: CPT

## 2022-05-24 PROCEDURE — 1101F PT FALLS ASSESS-DOCD LE1/YR: CPT | Mod: CPTII,S$GLB,, | Performed by: INTERNAL MEDICINE

## 2022-05-24 PROCEDURE — 1160F PR REVIEW ALL MEDS BY PRESCRIBER/CLIN PHARMACIST DOCUMENTED: ICD-10-PCS | Mod: CPTII,S$GLB,, | Performed by: INTERNAL MEDICINE

## 2022-05-24 PROCEDURE — 99999 PR PBB SHADOW E&M-EST. PATIENT-LVL III: CPT | Mod: PBBFAC,,, | Performed by: INTERNAL MEDICINE

## 2022-05-24 PROCEDURE — 93010 ELECTROCARDIOGRAM REPORT: CPT | Mod: ,,, | Performed by: INTERNAL MEDICINE

## 2022-05-24 RX ORDER — METOPROLOL SUCCINATE 25 MG/1
25 TABLET, EXTENDED RELEASE ORAL DAILY
Qty: 90 TABLET | Refills: 3 | Status: CANCELLED | OUTPATIENT
Start: 2022-05-24

## 2022-05-24 RX ORDER — METOPROLOL SUCCINATE 25 MG/1
25 TABLET, EXTENDED RELEASE ORAL DAILY
Qty: 90 TABLET | Refills: 4 | Status: SHIPPED | OUTPATIENT
Start: 2022-05-24 | End: 2023-08-01

## 2022-05-24 NOTE — PROGRESS NOTES
Subjective:    Patient ID:  Destini Augustine is a 89 y.o. female who presents for evaluation of hospitals Care        HPI  Pt presents to establish care.  Her current med conditions include permanent a fib, PHTN, TR, MR, aortic valve sclerosis.  Nonsmoker.  F/u by Dr Gibson, Cardiology, who retired.  Chart reviewed over last many years.  This is my first clinic visit w pt.  On Eliquis.  Takes once daily to minimize risk of bleeding.  Echo 2016 normal EF, aortic valve sclerosis, mild TR, mild-mod TR, PAP 46 mmHg.  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  ecg today a fib w controlled VR, left axis, chronic septal infarct.  No angina.  No CHF sxs.  BP stable.  No abnl bleeding on Eliquis.  No falls.  No palpitations.  Somewhat active.         Past Medical History:   Diagnosis Date    Arthritis     knees, hands    Atrial fibrillation     Basal cell carcinoma     face, right thigh    COAG (chronic open-angle glaucoma) - Both Eyes 8/28/2013    Ex-smoker     Hamartoma     mandible    Macular degeneration     Osteopenia     7/14 shama 7/16    Pulmonary heart disease     Scapholunate ligament injury with no instability     Scoliosis 11/30/15    lumbar x-ray     Current Outpatient Medications   Medication Instructions    alendronate (FOSAMAX) 70 mg, Oral, Every 7 days    BIOTIN ORAL 1 tablet, Oral, 2 times daily, Once daily per patient    CALCIUM CARBONATE/VITAMIN D3 (CALCIUM 600 + D,3, ORAL) 1 capsule, Oral, 2 times daily    diclofenac sodium (VOLTAREN) 2 g, Topical (Top), 2 times daily PRN    DOCOSAHEXANOIC ACID/EPA (FISH OIL ORAL) 1 capsule, Oral, 2 times daily    ELIQUIS 2.5 mg Tab TAKE 1 TABLET TWICE DAILY    GLUCOSAMINE HCL/CHONDRO COBB A (GLUCOSAMINE-CHONDROITIN ORAL) 1 tablet, Oral, 2 times daily    latanoprost 0.005 % ophthalmic solution INSTILL 1 DROP INTO BOTH EYES EVERY NIGHT    metoprolol succinate (TOPROL-XL) 25 MG 24 hr tablet TAKE 1 TABLET EVERY DAY     "multivitamin (THERAGRAN) per tablet 1 tablet, Oral, Daily    timolol maleate 0.5% (TIMOPTIC) 0.5 % Drop 1 drop, Right Eye, Every morning    turmeric root extract 500 mg Cap Oral, Once daily per patient    vitamin A-vitamin C-vit E-min Tab 1 tablet, Daily         Review of Systems   Constitutional: Negative.   HENT: Negative.    Eyes: Negative.    Cardiovascular: Negative.    Respiratory: Negative.    Endocrine: Negative.    Hematologic/Lymphatic: Negative.    Skin: Negative.    Musculoskeletal: Positive for arthritis.   Gastrointestinal: Negative.    Genitourinary: Negative.    Neurological: Negative.    Psychiatric/Behavioral: Negative.    Allergic/Immunologic: Negative.        BP (!) 140/80 (BP Location: Right arm, Patient Position: Sitting, BP Method: Large (Manual))   Pulse 75   Ht 5' 7" (1.702 m)   Wt 58.8 kg (129 lb 10.1 oz)   SpO2 98%   BMI 20.30 kg/m²     Wt Readings from Last 3 Encounters:   05/24/22 58.8 kg (129 lb 10.1 oz)   02/14/22 58.1 kg (128 lb)   02/14/22 58.5 kg (128 lb 15.5 oz)     Temp Readings from Last 3 Encounters:   02/14/22 98.4 °F (36.9 °C) (Tympanic)   10/18/21 98.4 °F (36.9 °C) (Temporal)   08/12/21 98.8 °F (37.1 °C) (Tympanic)     BP Readings from Last 3 Encounters:   05/24/22 (!) 140/80   02/14/22 124/82   12/22/21 (!) 140/70     Pulse Readings from Last 3 Encounters:   05/24/22 75   02/14/22 104   12/22/21 82          Objective:    Physical Exam  Vitals and nursing note reviewed.   Constitutional:       General: She is not in acute distress.     Appearance: Normal appearance. She is well-developed. She is not ill-appearing or diaphoretic.   HENT:      Head: Normocephalic.   Neck:      Thyroid: No thyromegaly.      Vascular: Normal carotid pulses. No carotid bruit, hepatojugular reflux or JVD.   Cardiovascular:      Rate and Rhythm: Normal rate. Rhythm irregularly irregular.      Chest Wall: PMI is not displaced.      Pulses: Normal pulses.           Radial pulses are 2+ on the " right side and 2+ on the left side.      Heart sounds: Normal heart sounds, S1 normal and S2 normal. No murmur heard.    No friction rub. No gallop.   Pulmonary:      Effort: Pulmonary effort is normal.      Breath sounds: Normal breath sounds. No wheezing or rales.   Abdominal:      General: Bowel sounds are normal. There is no abdominal bruit.      Palpations: Abdomen is soft. There is no hepatomegaly, splenomegaly or mass.      Tenderness: There is no abdominal tenderness.   Musculoskeletal:      Cervical back: Neck supple.   Lymphadenopathy:      Cervical: No cervical adenopathy.   Skin:     General: Skin is warm.   Neurological:      Mental Status: She is alert and oriented to person, place, and time.   Psychiatric:         Behavior: Behavior normal. Behavior is cooperative.       I have reviewed all pertinent labs and cardiac studies.      Chemistry        Component Value Date/Time     08/12/2021 0956    K 4.5 08/12/2021 0956     08/12/2021 0956    CO2 29 08/12/2021 0956    BUN 22 08/12/2021 0956    CREATININE 0.8 08/12/2021 0956    GLU 92 08/12/2021 0956        Component Value Date/Time    CALCIUM 10.1 08/12/2021 0956    ALKPHOS 61 08/12/2021 0956    AST 27 08/12/2021 0956    ALT 21 08/12/2021 0956    BILITOT 0.7 08/12/2021 0956    ESTGFRAFRICA >60.0 08/12/2021 0956    EGFRNONAA >60.0 08/12/2021 0956        Lab Results   Component Value Date    WBC 4.81 08/12/2021    HGB 15.0 08/12/2021    HCT 46.0 08/12/2021    MCV 90 08/12/2021     08/12/2021       Lab Results   Component Value Date    TSH 1.055 10/05/2017     No results found for: LABA1C, HGBA1C  Lab Results   Component Value Date    CHOL 140 08/12/2021    CHOL 149 10/05/2017    CHOL 148 08/17/2016     Lab Results   Component Value Date    HDL 66 08/12/2021    HDL 68 10/05/2017    HDL 65 08/17/2016     Lab Results   Component Value Date    LDLCALC 61.2 (L) 08/12/2021    LDLCALC 68.4 10/05/2017    LDLCALC 71.0 08/17/2016     Lab Results    Component Value Date    TRIG 64 08/12/2021    TRIG 63 10/05/2017    TRIG 60 08/17/2016     Lab Results   Component Value Date    CHOLHDL 47.1 08/12/2021    CHOLHDL 45.6 10/05/2017    CHOLHDL 43.9 08/17/2016           Assessment:       1. Atrial fibrillation, permanent    2. Anticoagulated    3. Abnormal ECG    4. Pulmonary hypertension    5. Nonrheumatic tricuspid valve regurgitation    6. Nonrheumatic mitral valve regurgitation         Plan:               Stable cardiovascular conditions at present time on current medical treatment.  Continue Eliquis: takes qd due to bleeding on bid.  Reviewed all tests and above medical conditions with patient in detail and formulated treatment plan.  Cardiac low salt diet advised.  Daily exercise encouraged, as tolerated.  F/u in 1 year w echo.      I have reviewed all pertinent labs and cardiac studies independently. Plans and recommendations have been formulated under my direct supervision. All questions answered and patient voiced understanding.

## 2022-08-10 ENCOUNTER — PATIENT MESSAGE (OUTPATIENT)
Dept: CARDIOLOGY | Facility: CLINIC | Age: 87
End: 2022-08-10
Payer: MEDICARE

## 2022-08-10 ENCOUNTER — LAB VISIT (OUTPATIENT)
Dept: LAB | Facility: HOSPITAL | Age: 87
End: 2022-08-10
Attending: FAMILY MEDICINE
Payer: MEDICARE

## 2022-08-10 DIAGNOSIS — I48.19 ATRIAL FIBRILLATION, PERSISTENT: ICD-10-CM

## 2022-08-10 DIAGNOSIS — Z79.01 ANTICOAGULATED: ICD-10-CM

## 2022-08-10 DIAGNOSIS — I48.21 ATRIAL FIBRILLATION, PERMANENT: ICD-10-CM

## 2022-08-10 LAB
ALBUMIN SERPL BCP-MCNC: 3.7 G/DL (ref 3.5–5.2)
ALP SERPL-CCNC: 50 U/L (ref 55–135)
ALT SERPL W/O P-5'-P-CCNC: 16 U/L (ref 10–44)
ANION GAP SERPL CALC-SCNC: 10 MMOL/L (ref 8–16)
AST SERPL-CCNC: 24 U/L (ref 10–40)
BASOPHILS # BLD AUTO: 0.03 K/UL (ref 0–0.2)
BASOPHILS NFR BLD: 0.7 % (ref 0–1.9)
BILIRUB SERPL-MCNC: 0.9 MG/DL (ref 0.1–1)
BUN SERPL-MCNC: 19 MG/DL (ref 8–23)
CALCIUM SERPL-MCNC: 9.9 MG/DL (ref 8.7–10.5)
CHLORIDE SERPL-SCNC: 103 MMOL/L (ref 95–110)
CO2 SERPL-SCNC: 25 MMOL/L (ref 23–29)
CREAT SERPL-MCNC: 0.8 MG/DL (ref 0.5–1.4)
DIFFERENTIAL METHOD: NORMAL
EOSINOPHIL # BLD AUTO: 0.2 K/UL (ref 0–0.5)
EOSINOPHIL NFR BLD: 4 % (ref 0–8)
ERYTHROCYTE [DISTWIDTH] IN BLOOD BY AUTOMATED COUNT: 13.9 % (ref 11.5–14.5)
EST. GFR  (NO RACE VARIABLE): >60 ML/MIN/1.73 M^2
GLUCOSE SERPL-MCNC: 76 MG/DL (ref 70–110)
HCT VFR BLD AUTO: 43.5 % (ref 37–48.5)
HGB BLD-MCNC: 14.2 G/DL (ref 12–16)
IMM GRANULOCYTES # BLD AUTO: 0.01 K/UL (ref 0–0.04)
IMM GRANULOCYTES NFR BLD AUTO: 0.2 % (ref 0–0.5)
LYMPHOCYTES # BLD AUTO: 1.2 K/UL (ref 1–4.8)
LYMPHOCYTES NFR BLD: 28.9 % (ref 18–48)
MCH RBC QN AUTO: 29.8 PG (ref 27–31)
MCHC RBC AUTO-ENTMCNC: 32.6 G/DL (ref 32–36)
MCV RBC AUTO: 91 FL (ref 82–98)
MONOCYTES # BLD AUTO: 0.3 K/UL (ref 0.3–1)
MONOCYTES NFR BLD: 7.7 % (ref 4–15)
NEUTROPHILS # BLD AUTO: 2.4 K/UL (ref 1.8–7.7)
NEUTROPHILS NFR BLD: 58.5 % (ref 38–73)
NRBC BLD-RTO: 0 /100 WBC
PLATELET # BLD AUTO: 165 K/UL (ref 150–450)
PMV BLD AUTO: 12.2 FL (ref 9.2–12.9)
POTASSIUM SERPL-SCNC: 4.2 MMOL/L (ref 3.5–5.1)
PROT SERPL-MCNC: 6.9 G/DL (ref 6–8.4)
RBC # BLD AUTO: 4.76 M/UL (ref 4–5.4)
SODIUM SERPL-SCNC: 138 MMOL/L (ref 136–145)
WBC # BLD AUTO: 4.05 K/UL (ref 3.9–12.7)

## 2022-08-10 PROCEDURE — 80053 COMPREHEN METABOLIC PANEL: CPT | Performed by: FAMILY MEDICINE

## 2022-08-10 PROCEDURE — 36415 COLL VENOUS BLD VENIPUNCTURE: CPT | Performed by: FAMILY MEDICINE

## 2022-08-10 PROCEDURE — 85025 COMPLETE CBC W/AUTO DIFF WBC: CPT | Performed by: FAMILY MEDICINE

## 2022-08-15 ENCOUNTER — OFFICE VISIT (OUTPATIENT)
Dept: INTERNAL MEDICINE | Facility: CLINIC | Age: 87
End: 2022-08-15
Payer: MEDICARE

## 2022-08-15 VITALS
OXYGEN SATURATION: 95 % | HEIGHT: 67 IN | DIASTOLIC BLOOD PRESSURE: 70 MMHG | SYSTOLIC BLOOD PRESSURE: 118 MMHG | WEIGHT: 131.19 LBS | BODY MASS INDEX: 20.59 KG/M2 | TEMPERATURE: 97 F | HEART RATE: 104 BPM

## 2022-08-15 DIAGNOSIS — I27.9 PULMONARY HEART DISEASE: ICD-10-CM

## 2022-08-15 DIAGNOSIS — I48.21 ATRIAL FIBRILLATION, PERMANENT: Primary | Chronic | ICD-10-CM

## 2022-08-15 DIAGNOSIS — M85.89 OSTEOPENIA OF MULTIPLE SITES: ICD-10-CM

## 2022-08-15 DIAGNOSIS — I70.0 ATHEROSCLEROSIS OF AORTA: ICD-10-CM

## 2022-08-15 DIAGNOSIS — I27.20 PULMONARY HYPERTENSION: ICD-10-CM

## 2022-08-15 PROCEDURE — 1160F RVW MEDS BY RX/DR IN RCRD: CPT | Mod: CPTII,S$GLB,, | Performed by: PHYSICIAN ASSISTANT

## 2022-08-15 PROCEDURE — 3288F PR FALLS RISK ASSESSMENT DOCUMENTED: ICD-10-PCS | Mod: CPTII,S$GLB,, | Performed by: PHYSICIAN ASSISTANT

## 2022-08-15 PROCEDURE — 99213 PR OFFICE/OUTPT VISIT, EST, LEVL III, 20-29 MIN: ICD-10-PCS | Mod: S$GLB,,, | Performed by: PHYSICIAN ASSISTANT

## 2022-08-15 PROCEDURE — 1126F AMNT PAIN NOTED NONE PRSNT: CPT | Mod: CPTII,S$GLB,, | Performed by: PHYSICIAN ASSISTANT

## 2022-08-15 PROCEDURE — 99213 OFFICE O/P EST LOW 20 MIN: CPT | Mod: S$GLB,,, | Performed by: PHYSICIAN ASSISTANT

## 2022-08-15 PROCEDURE — 1101F PT FALLS ASSESS-DOCD LE1/YR: CPT | Mod: CPTII,S$GLB,, | Performed by: PHYSICIAN ASSISTANT

## 2022-08-15 PROCEDURE — 1159F PR MEDICATION LIST DOCUMENTED IN MEDICAL RECORD: ICD-10-PCS | Mod: CPTII,S$GLB,, | Performed by: PHYSICIAN ASSISTANT

## 2022-08-15 PROCEDURE — 1101F PR PT FALLS ASSESS DOC 0-1 FALLS W/OUT INJ PAST YR: ICD-10-PCS | Mod: CPTII,S$GLB,, | Performed by: PHYSICIAN ASSISTANT

## 2022-08-15 PROCEDURE — 99999 PR PBB SHADOW E&M-EST. PATIENT-LVL III: CPT | Mod: PBBFAC,,, | Performed by: PHYSICIAN ASSISTANT

## 2022-08-15 PROCEDURE — 1159F MED LIST DOCD IN RCRD: CPT | Mod: CPTII,S$GLB,, | Performed by: PHYSICIAN ASSISTANT

## 2022-08-15 PROCEDURE — 1126F PR PAIN SEVERITY QUANTIFIED, NO PAIN PRESENT: ICD-10-PCS | Mod: CPTII,S$GLB,, | Performed by: PHYSICIAN ASSISTANT

## 2022-08-15 PROCEDURE — 1160F PR REVIEW ALL MEDS BY PRESCRIBER/CLIN PHARMACIST DOCUMENTED: ICD-10-PCS | Mod: CPTII,S$GLB,, | Performed by: PHYSICIAN ASSISTANT

## 2022-08-15 PROCEDURE — 3288F FALL RISK ASSESSMENT DOCD: CPT | Mod: CPTII,S$GLB,, | Performed by: PHYSICIAN ASSISTANT

## 2022-08-15 PROCEDURE — 99999 PR PBB SHADOW E&M-EST. PATIENT-LVL III: ICD-10-PCS | Mod: PBBFAC,,, | Performed by: PHYSICIAN ASSISTANT

## 2022-09-29 ENCOUNTER — OFFICE VISIT (OUTPATIENT)
Dept: OPHTHALMOLOGY | Facility: CLINIC | Age: 87
End: 2022-09-29
Payer: MEDICARE

## 2022-09-29 DIAGNOSIS — Z96.1 PSEUDOPHAKIA OF BOTH EYES: ICD-10-CM

## 2022-09-29 DIAGNOSIS — H02.401 PTOSIS OF RIGHT EYELID: ICD-10-CM

## 2022-09-29 DIAGNOSIS — H04.129 DRY EYE: ICD-10-CM

## 2022-09-29 DIAGNOSIS — H40.1132 PRIMARY OPEN ANGLE GLAUCOMA OF BOTH EYES, MODERATE STAGE: Primary | ICD-10-CM

## 2022-09-29 PROCEDURE — 1160F RVW MEDS BY RX/DR IN RCRD: CPT | Mod: CPTII,S$GLB,, | Performed by: OPHTHALMOLOGY

## 2022-09-29 PROCEDURE — 1160F PR REVIEW ALL MEDS BY PRESCRIBER/CLIN PHARMACIST DOCUMENTED: ICD-10-PCS | Mod: CPTII,S$GLB,, | Performed by: OPHTHALMOLOGY

## 2022-09-29 PROCEDURE — 92083 EXTENDED VISUAL FIELD XM: CPT | Mod: S$GLB,,, | Performed by: OPHTHALMOLOGY

## 2022-09-29 PROCEDURE — 99999 PR PBB SHADOW E&M-EST. PATIENT-LVL II: ICD-10-PCS | Mod: PBBFAC,,, | Performed by: OPHTHALMOLOGY

## 2022-09-29 PROCEDURE — 99214 OFFICE O/P EST MOD 30 MIN: CPT | Mod: S$GLB,,, | Performed by: OPHTHALMOLOGY

## 2022-09-29 PROCEDURE — 1159F MED LIST DOCD IN RCRD: CPT | Mod: CPTII,S$GLB,, | Performed by: OPHTHALMOLOGY

## 2022-09-29 PROCEDURE — 99214 PR OFFICE/OUTPT VISIT, EST, LEVL IV, 30-39 MIN: ICD-10-PCS | Mod: S$GLB,,, | Performed by: OPHTHALMOLOGY

## 2022-09-29 PROCEDURE — 92083 HUMPHREY VISUAL FIELD - OU - BOTH EYES: ICD-10-PCS | Mod: S$GLB,,, | Performed by: OPHTHALMOLOGY

## 2022-09-29 PROCEDURE — 99999 PR PBB SHADOW E&M-EST. PATIENT-LVL II: CPT | Mod: PBBFAC,,, | Performed by: OPHTHALMOLOGY

## 2022-09-29 PROCEDURE — 1159F PR MEDICATION LIST DOCUMENTED IN MEDICAL RECORD: ICD-10-PCS | Mod: CPTII,S$GLB,, | Performed by: OPHTHALMOLOGY

## 2022-09-29 NOTE — PROGRESS NOTES
SUBJECTIVE  Destini Augustine is 89 y.o. female  Uncorrected distance visual acuity was 20/40 in the right eye and 20/30 in the left eye.   Chief Complaint   Patient presents with    Glaucoma     Patient reports for 4 month IOP check. Using gtts as advised. Denies pain or irritation at this time. Patient reports of visual stability since previous visit.             HPI     Glaucoma     Additional comments: Patient reports for 4 month IOP check. Using gtts as   advised. Denies pain or irritation at this time. Patient reports of visual   stability since previous visit.              Comments    1. Mod COAG OD>OS (Initial 30/26) goal = 17   2. PCIOL OD w/ ECP & Toric IOL 5/20/09   PCIOL OS w/ ECP & Toric IOL 4/22/09   3. Mild Dry AMD   4. PCO OS   5. Pseudophakia OU      Latanoprost QHS OU   Timolol BID OD     Ocuvite   HAG (does not do)   O3FO  WC/LS            Last edited by Tony Watts on 9/29/2022 10:22 AM.         Assessment /Plan :  1. Primary open angle glaucoma of both eyes, moderate stage Doing well, intraocular pressure (IOP) within acceptable range relative to target IOP and no evidence of progression. Continue current treatment. Reviewed importance of continued compliance with treatment and follow up.      Patient instructed to continue using the following glaucoma medication as follows:  Latanoprost one drop in each eye nightly and Timolol one drop in each eye every 12 hours    Return to clinic in 4 months  or as needed.  With GOCT and Dilation     2. Pseudophakia of both eyes  -- Condition stable, no therapeutic change required. Monitoring routinely.    3. Ptosis of right eyelid    4. Dry eye  -- Condition stable, no therapeutic change required. Monitoring routinely.

## 2022-11-21 ENCOUNTER — TELEPHONE (OUTPATIENT)
Dept: ADMINISTRATIVE | Facility: HOSPITAL | Age: 87
End: 2022-11-21
Payer: MEDICARE

## 2022-12-07 ENCOUNTER — HOSPITAL ENCOUNTER (EMERGENCY)
Facility: HOSPITAL | Age: 87
Discharge: HOME | End: 2022-12-07
Attending: EMERGENCY MEDICINE
Payer: MEDICARE

## 2022-12-07 VITALS
TEMPERATURE: 98 F | DIASTOLIC BLOOD PRESSURE: 98 MMHG | RESPIRATION RATE: 16 BRPM | SYSTOLIC BLOOD PRESSURE: 148 MMHG | BODY MASS INDEX: 20.46 KG/M2 | HEART RATE: 91 BPM | WEIGHT: 130.63 LBS | OXYGEN SATURATION: 98 %
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
## 2022-12-07 DIAGNOSIS — S09.90XA INJURY OF HEAD, INITIAL ENCOUNTER: ICD-10-CM

## 2022-12-07 DIAGNOSIS — S01.01XA LACERATION OF SCALP, INITIAL ENCOUNTER: ICD-10-CM

## 2022-12-07 LAB
ALBUMIN SERPL BCP-MCNC: 3.9 G/DL (ref 3.5–5.2)
ALP SERPL-CCNC: 60 U/L (ref 55–135)
ALT SERPL W/O P-5'-P-CCNC: 16 U/L (ref 10–44)
ANION GAP SERPL CALC-SCNC: 12 MMOL/L (ref 8–16)
APTT BLDCRRT: 25.9 SEC (ref 21–32)
AST SERPL-CCNC: 27 U/L (ref 10–40)
BASOPHILS # BLD AUTO: 0.05 K/UL (ref 0–0.2)
BASOPHILS NFR BLD: 0.8 % (ref 0–1.9)
BILIRUB SERPL-MCNC: 0.8 MG/DL (ref 0.1–1)
BUN SERPL-MCNC: 20 MG/DL (ref 8–23)
CALCIUM SERPL-MCNC: 9.7 MG/DL (ref 8.7–10.5)
CHLORIDE SERPL-SCNC: 100 MMOL/L (ref 95–110)
CO2 SERPL-SCNC: 26 MMOL/L (ref 23–29)
CREAT SERPL-MCNC: 0.8 MG/DL (ref 0.5–1.4)
DIFFERENTIAL METHOD: NORMAL
EOSINOPHIL # BLD AUTO: 0.1 K/UL (ref 0–0.5)
EOSINOPHIL NFR BLD: 2.3 % (ref 0–8)
ERYTHROCYTE [DISTWIDTH] IN BLOOD BY AUTOMATED COUNT: 13.5 % (ref 11.5–14.5)
EST. GFR  (NO RACE VARIABLE): >60 ML/MIN/1.73 M^2
GLUCOSE SERPL-MCNC: 97 MG/DL (ref 70–110)
HCT VFR BLD AUTO: 41.4 % (ref 37–48.5)
HGB BLD-MCNC: 13.9 G/DL (ref 12–16)
IMM GRANULOCYTES # BLD AUTO: 0.02 K/UL (ref 0–0.04)
IMM GRANULOCYTES NFR BLD AUTO: 0.3 % (ref 0–0.5)
INR PPP: 1 (ref 0.8–1.2)
LYMPHOCYTES # BLD AUTO: 1.5 K/UL (ref 1–4.8)
LYMPHOCYTES NFR BLD: 24.3 % (ref 18–48)
MCH RBC QN AUTO: 29.6 PG (ref 27–31)
MCHC RBC AUTO-ENTMCNC: 33.6 G/DL (ref 32–36)
MCV RBC AUTO: 88 FL (ref 82–98)
MONOCYTES # BLD AUTO: 0.3 K/UL (ref 0.3–1)
MONOCYTES NFR BLD: 5.1 % (ref 4–15)
NEUTROPHILS # BLD AUTO: 4.1 K/UL (ref 1.8–7.7)
NEUTROPHILS NFR BLD: 67.2 % (ref 38–73)
NRBC BLD-RTO: 0 /100 WBC
PLATELET # BLD AUTO: 167 K/UL (ref 150–450)
PMV BLD AUTO: 10.5 FL (ref 9.2–12.9)
POTASSIUM SERPL-SCNC: 4.5 MMOL/L (ref 3.5–5.1)
PROT SERPL-MCNC: 7.3 G/DL (ref 6–8.4)
PROTHROMBIN TIME: 11.2 SEC (ref 9–12.5)
RBC # BLD AUTO: 4.69 M/UL (ref 4–5.4)
SODIUM SERPL-SCNC: 138 MMOL/L (ref 136–145)
WBC # BLD AUTO: 6.04 K/UL (ref 3.9–12.7)

## 2022-12-07 PROCEDURE — 80053 COMPREHEN METABOLIC PANEL: CPT | Mod: HCNC | Performed by: NURSE PRACTITIONER

## 2022-12-07 PROCEDURE — 25000003 PHARM REV CODE 250: Mod: HCNC | Performed by: EMERGENCY MEDICINE

## 2022-12-07 PROCEDURE — 85730 THROMBOPLASTIN TIME PARTIAL: CPT | Mod: HCNC | Performed by: NURSE PRACTITIONER

## 2022-12-07 PROCEDURE — 85610 PROTHROMBIN TIME: CPT | Mod: HCNC | Performed by: NURSE PRACTITIONER

## 2022-12-07 PROCEDURE — 85025 COMPLETE CBC W/AUTO DIFF WBC: CPT | Mod: HCNC | Performed by: NURSE PRACTITIONER

## 2022-12-07 PROCEDURE — 99284 EMERGENCY DEPT VISIT MOD MDM: CPT | Mod: 25,HCNC

## 2022-12-07 PROCEDURE — 12002 RPR S/N/AX/GEN/TRNK2.6-7.5CM: CPT | Mod: HCNC

## 2022-12-07 RX ORDER — MUPIROCIN 20 MG/G
1 OINTMENT TOPICAL
Status: COMPLETED | OUTPATIENT
Start: 2022-12-07 | End: 2022-12-07

## 2022-12-07 RX ORDER — MUPIROCIN 20 MG/G
OINTMENT TOPICAL 3 TIMES DAILY
Qty: 22 G | Refills: 0 | Status: SHIPPED | OUTPATIENT
Start: 2022-12-07 | End: 2022-12-14

## 2022-12-07 RX ADMIN — Medication 1 ML: at 10:12

## 2022-12-07 RX ADMIN — MUPIROCIN 22 G: 20 OINTMENT TOPICAL at 11:12

## 2022-12-08 NOTE — ED PROVIDER NOTES
SCRIBE #1 NOTE: I, Nilton Jane, am scribing for, and in the presence of, No att. providers found. I have scribed the entire note.       History     Chief Complaint   Patient presents with    Fall     Pt was exercising in kitchen and fell. Does not remember being lightheaded but remembers waking up and calling for help. Lac to posterior head. Bleeding controlled in triage. + blood thinners. Ambulatory to triage     Review of patient's allergies indicates:   Allergen Reactions    Shellfish containing products Diarrhea and Nausea And Vomiting    Venom-wasp Itching and Swelling         History of Present Illness     HPI    12/7/2022, 10:26 PM  History obtained from the patient      History of Present Illness: Destini Augustine is a 89 y.o. female patient with a PMHx of COAG, A-fib and pulmonary heart disease who presents to the Emergency Department for evaluation of a fall which onset gradually today. Pt states she lost her balance while exercising causing her to hit her head. Symptoms are constant and moderate in severity. No mitigating or exacerbating factors reported. No associated sxs . Patient denies any SOB, CP, fever, chills, abd pain, N/V/D, hip pain, neck pain, back pain, weakness, numbness and all other sxs at this time. No prior Tx . No further complaints or concerns at this time.       Arrival mode: Personal vehicle        PCP: Glenn Love MD        Past Medical History:  Past Medical History:   Diagnosis Date    Arthritis     knees, hands    Atrial fibrillation     Basal cell carcinoma     face, right thigh    COAG (chronic open-angle glaucoma) - Both Eyes 8/28/2013    Ex-smoker     Ham[place]     mandible    Macular degeneration     Osteopenia     7/14 shama 7/16    Pulmonary heart disease     Scapholunate ligament injury with no instability     Scoliosis 11/30/15    lumbar x-ray       Past Surgical History:  Past Surgical History:   Procedure Laterality Date    APPENDECTOMY  1970s    CATARACT  EXTRACTION Bilateral     DR. Singleton    hysterectomy  1970s    complete    HYSTERECTOMY      about 39yrs old, partial    MOUTH SURGERY Bilateral 02/10/2020    rectocele and cystocele  1998 approx    TONSILLECTOMY, ADENOIDECTOMY  7 y/o         Family History:  Family History   Problem Relation Age of Onset    Cataracts Mother     Hypertension Mother     Stroke Mother     Cancer Father         colon    Thyroid disease Father     Cancer Brother         lung    Tuberculosis Brother     Blindness Neg Hx     Diabetes Neg Hx     Macular degeneration Neg Hx     Retinal detachment Neg Hx     Strabismus Neg Hx        Social History:  Social History     Tobacco Use    Smoking status: Former     Packs/day: 1.50     Years: 30.00     Pack years: 45.00     Types: Cigarettes     Quit date: 1980     Years since quittin.8    Smokeless tobacco: Never   Substance and Sexual Activity    Alcohol use: Not Currently    Drug use: No    Sexual activity: Never     Partners: Female        Review of Systems     Review of Systems   Constitutional:  Negative for fever.   HENT:  Negative for sore throat.    Respiratory:  Negative for shortness of breath.    Cardiovascular:  Negative for chest pain.   Gastrointestinal:  Negative for nausea.   Genitourinary:  Negative for dysuria.   Musculoskeletal:  Negative for back pain.   Skin:  Positive for wound. Negative for rash.   Neurological:  Negative for weakness and headaches.   Hematological:  Does not bruise/bleed easily.   All other systems reviewed and are negative.     Physical Exam     Initial Vitals [22]   BP Pulse Resp Temp SpO2   (!) 180/97 86 16 98.2 °F (36.8 °C) 98 %      MAP       --          Physical Exam  Nursing Notes and Vital Signs Reviewed.  Constitutional: Patient is in no acute distress. Well-developed and well-nourished.  Head: Normocephalic. 3 cm stellate laceration to crown of the scalp.   Eyes: PERRL. EOM intact. Conjunctivae are not pale. No scleral  icterus.  ENT: Mucous membranes are moist. Oropharynx is clear and symmetric.    Neck: Supple. Full ROM. No lymphadenopathy.   Cardiovascular: Regular rate. Regular rhythm. No murmurs, rubs, or gallops. Distal pulses are 2+ and symmetric.  Pulmonary/Chest: No respiratory distress. Clear to auscultation bilaterally. No wheezing or rales.  Abdominal: Soft and non-distended.  There is no tenderness.  No rebound, guarding, or rigidity.   Genitourinary: No CVA tenderness  Musculoskeletal: Moves all extremities. No obvious deformities. No edema. No calf tenderness.No midline spinal tenderness. No long bone tenderness. No hip or other joint tenderness.   Skin: Warm and dry.  Neurological:  Alert, awake, and appropriate.  Normal speech.  No acute focal neurological deficits are appreciated.  Psychiatric: Normal affect. Good eye contact. Appropriate in content.     ED Course   Lac Repair    Date/Time: 12/8/2022 1:30 AM  Performed by: Nilton Jane MD  Authorized by: Nilton Jane MD     Consent:     Consent obtained:  Verbal    Consent given by:  Patient    Risks, benefits, and alternatives were discussed: yes      Risks discussed:  Infection, pain and poor wound healing    Alternatives discussed:  No treatment  Universal protocol:     Patient identity confirmed:  Verbally with patient and arm band  Anesthesia:     Anesthesia method:  Topical application    Topical anesthetic:  LET  Laceration details:     Location:  Scalp    Scalp location:  Crown    Length (cm):  3    Depth (mm):  0  Pre-procedure details:     Preparation:  Patient was prepped and draped in usual sterile fashion and imaging obtained to evaluate for foreign bodies  Exploration:     Limited defect created (wound extended): no      Hemostasis achieved with:  LET    Imaging obtained comment:  CT    Imaging outcome: foreign body not noted      Wound exploration: wound explored through full range of motion and entire depth of wound visualized      Wound  extent: no fascia violation noted, no foreign bodies/material noted, no muscle damage noted, no nerve damage noted, no tendon damage noted, no underlying fracture noted and no vascular damage noted      Contaminated: no    Treatment:     Area cleansed with:  Saline    Amount of cleaning:  Standard    Irrigation solution:  Sterile saline    Irrigation method:  Syringe    Debridement:  Minimal    Undermining:  None    Scar revision: no    Skin repair:     Repair method:  Sutures    Suture size:  3-0    Suture material:  Prolene    Suture technique:  Simple interrupted    Number of sutures:  6  Approximation:     Approximation:  Close  Repair type:     Repair type:  Simple  Post-procedure details:     Dressing:  Antibiotic ointment and sterile dressing    Procedure completion:  Tolerated well, no immediate complications  ED Vital Signs:  Vitals:    12/07/22 1927 12/07/22 2231   BP: (!) 180/97 (!) 148/98   Pulse: 86 91   Resp: 16    Temp: 98.2 °F (36.8 °C)    TempSrc: Oral    SpO2: 98% 98%   Weight: 59.2 kg (130 lb 10 oz)        Abnormal Lab Results:  Labs Reviewed   CBC W/ AUTO DIFFERENTIAL   COMPREHENSIVE METABOLIC PANEL   APTT   PROTIME-INR        All Lab Results:  Results for orders placed or performed during the hospital encounter of 12/07/22   CBC auto differential   Result Value Ref Range    WBC 6.04 3.90 - 12.70 K/uL    RBC 4.69 4.00 - 5.40 M/uL    Hemoglobin 13.9 12.0 - 16.0 g/dL    Hematocrit 41.4 37.0 - 48.5 %    MCV 88 82 - 98 fL    MCH 29.6 27.0 - 31.0 pg    MCHC 33.6 32.0 - 36.0 g/dL    RDW 13.5 11.5 - 14.5 %    Platelets 167 150 - 450 K/uL    MPV 10.5 9.2 - 12.9 fL    Immature Granulocytes 0.3 0.0 - 0.5 %    Gran # (ANC) 4.1 1.8 - 7.7 K/uL    Immature Grans (Abs) 0.02 0.00 - 0.04 K/uL    Lymph # 1.5 1.0 - 4.8 K/uL    Mono # 0.3 0.3 - 1.0 K/uL    Eos # 0.1 0.0 - 0.5 K/uL    Baso # 0.05 0.00 - 0.20 K/uL    nRBC 0 0 /100 WBC    Gran % 67.2 38.0 - 73.0 %    Lymph % 24.3 18.0 - 48.0 %    Mono % 5.1 4.0 - 15.0  %    Eosinophil % 2.3 0.0 - 8.0 %    Basophil % 0.8 0.0 - 1.9 %    Differential Method Automated    Comprehensive metabolic panel   Result Value Ref Range    Sodium 138 136 - 145 mmol/L    Potassium 4.5 3.5 - 5.1 mmol/L    Chloride 100 95 - 110 mmol/L    CO2 26 23 - 29 mmol/L    Glucose 97 70 - 110 mg/dL    BUN 20 8 - 23 mg/dL    Creatinine 0.8 0.5 - 1.4 mg/dL    Calcium 9.7 8.7 - 10.5 mg/dL    Total Protein 7.3 6.0 - 8.4 g/dL    Albumin 3.9 3.5 - 5.2 g/dL    Total Bilirubin 0.8 0.1 - 1.0 mg/dL    Alkaline Phosphatase 60 55 - 135 U/L    AST 27 10 - 40 U/L    ALT 16 10 - 44 U/L    Anion Gap 12 8 - 16 mmol/L    eGFR >60 >60 mL/min/1.73 m^2   APTT   Result Value Ref Range    aPTT 25.9 21.0 - 32.0 sec   Protime-INR   Result Value Ref Range    Prothrombin Time 11.2 9.0 - 12.5 sec    INR 1.0 0.8 - 1.2         Imaging Results:  Imaging Results              CT Cervical Spine Without Contrast (Final result)  Result time 12/07/22 20:19:05      Final result by Sathish Tejeda MD (12/07/22 20:19:05)                   Impression:      No acute fracture or dislocation.    Mild degenerative joint disease of the spine    Right hypodense hypodense lesion.  Recommend non emergent thyroid ultrasound    Extensive apical emphysema with apical nodular scarring.  Recommend non emergent follow-up.    All CT scans   are performed using dose optimization techniques including the following: automated exposure control; adjustment of the mA and/or kV; use of iterative reconstruction technique.  Dose modulation was employed for ALARA by means of: Automated exposure control; adjustment of the mA and/or kV according to patient size (this includes techniques or standardized protocols for targeted exams where dose is matched to indication/reason for exam; i.e. extremities or head); and/or use of iterative reconstructive technique.      Electronically signed by: Nikhil Bower  Date:    12/07/2022  Time:    20:19               Narrative:     EXAMINATION:  CT CERVICAL SPINE WITHOUT CONTRAST    CLINICAL HISTORY:  Polytrauma, blunt;    TECHNIQUE:  Low dose axial images, sagittal and coronal reformations were performed though the cervical spine.  Contrast was not administered.    COMPARISON:  None    FINDINGS:  Normal vertebral body heights without evidence for spondylolisthesis.  Mild degenerative joint disease.  No prevertebral soft tissue swelling.  Facet joints are congruent.    Extensive apical emphysema with apical nodular scarring.  Recommend non emergent follow-up.    Right thyroid hypodense lesion.  Decreased bone mineral density                                       CT Head Without Contrast (Final result)  Result time 12/07/22 20:15:24      Final result by Sathish Tejeda MD (12/07/22 20:15:24)                   Impression:      No acute abnormality.    Atrophy and chronic white matter changes    All CT scans   are performed using dose optimization techniques including the following: automated exposure control; adjustment of the mA and/or kV; use of iterative reconstruction technique.  Dose modulation was employed for ALARA by means of: Automated exposure control; adjustment of the mA and/or kV according to patient size (this includes techniques or standardized protocols for targeted exams where dose is matched to indication/reason for exam; i.e. extremities or head); and/or use of iterative reconstructive technique.      Electronically signed by: Nikhil Bower  Date:    12/07/2022  Time:    20:15               Narrative:    EXAMINATION:  CT HEAD WITHOUT CONTRAST    CLINICAL HISTORY:  Head trauma, visual loss;    TECHNIQUE:  Low dose axial CT images obtained throughout the head without intravenous contrast. Sagittal and coronal reconstructions were performed.    COMPARISON:  None.    FINDINGS:  Atrophy and chronic white matter changes.  No extra-axial blood or fluid collections.    No parenchymal mass, hemorrhage, edema or major vascular  distribution infarct.    Skull/extracranial contents (limited evaluation): No fracture. Mastoid air cells and paranasal sinuses are essentially clear.                                                The Emergency Provider reviewed the vital signs and test results, which are outlined above.     ED Discussion     11:00 PM: Pateint received 6 stitches to the posterior head. 3.0 prolene.        11:34 PM Reassessment: Dr. Jane reassessed the pt.  The pt is resting comfortably and is NAD.  \ Discussed test results, shared treatment plan, specific conditions for return, and the need for f/u.  Answered their questions at this time.  Pt understands and agrees to the plan.  The pt has remained hemodynamically stable through ED course and is stable for discharge.     11:35 PM - Counseling: Spoke with the patient and discussed todays findings, in addition to providing specific details for the plan of care and counseling regarding the diagnosis and prognosis. Questions are answered at this time.        The above vital signs and test results have been reviewed by the emergency provider.       ED MEDICATIONS:  Medications   LETS (LIDOcaine-TETRAcaine-EPINEPHrine) gel solution (1 mL Topical (Top) Given 12/7/22 2225)   mupirocin 2 % ointment 22 g (22 g Topical (Top) Given 12/7/22 2327)                 Medical Decision Making:   Clinical Tests:   Lab Tests: Ordered and Reviewed  Radiological Study: Reviewed and Ordered         ED Medication(s):  Medications   LETS (LIDOcaine-TETRAcaine-EPINEPHrine) gel solution (1 mL Topical (Top) Given 12/7/22 2225)   mupirocin 2 % ointment 22 g (22 g Topical (Top) Given 12/7/22 2327)       Discharge Medication List as of 12/7/2022 11:34 PM        START taking these medications    Details   mupirocin (BACTROBAN) 2 % ointment Apply topically 3 (three) times daily. for 7 days, Starting Wed 12/7/2022, Until Wed 12/14/2022, Print              Follow-up Information       Glenn Love MD. Schedule  an appointment as soon as possible for a visit in 10 days.    Specialty: Family Medicine  Why: For suture removal, For re-evaluation and further treatment  Contact information:  [id number]  [place] [address] [phone]               [facility] - Emergency Dept.. Go today.    Specialty: Emergency Medicine  Why: If symptoms worsen, For re-evaluation and further treatment, As needed  Contact information:  [address] [facility] [address] [phone]                               Scribe Attestation:   Scribe #1: I performed the above scribed service and the documentation accurately describes the services I performed. I attest to the accuracy of the note.     Attending:   Physician Attestation Statement for Scribe #1: I, Nilton Jane MD, personally performed the services described in this documentation, as scribed by Vickie Asencio, in my presence, and it is both accurate and complete.           Clinical Impression       ICD-10-CM ICD-9-CM   1. Fall, initial encounter  W19.XXXA E888.9   2. Injury of head, initial encounter  S09.90XA 959.01   3. Laceration of scalp, initial encounter  S01.01XA 873.0       Disposition:   Disposition: Discharged  Condition: Stable       Nilton Jane MD  12/08/22 0132

## 2022-12-08 NOTE — FIRST PROVIDER EVALUATION
Medical screening examination initiated.  I have conducted a focused provider triage encounter, findings are as follows:    Brief history of present illness:  Patient presents with laceration to the top of the scalp after syncopal episode and fall today.    There were no vitals filed for this visit.    Pertinent physical exam:      Brief workup plan:      Preliminary workup initiated; this workup will be continued and followed by the physician or advanced practice provider that is assigned to the patient when roomed.

## 2022-12-08 NOTE — ED NOTES
Bed: 02  Expected date: 12/7/22  Expected time: 7:06 PM  Means of arrival: Personal Transportation  Comments:

## 2022-12-19 ENCOUNTER — OFFICE VISIT (OUTPATIENT)
Dept: INTERNAL MEDICINE | Facility: CLINIC | Age: 87
End: 2022-12-19
Payer: MEDICARE

## 2022-12-19 VITALS
HEIGHT: 67 IN | HEART RATE: 109 BPM | BODY MASS INDEX: 20.59 KG/M2 | OXYGEN SATURATION: 95 % | SYSTOLIC BLOOD PRESSURE: 138 MMHG | WEIGHT: 131.19 LBS | DIASTOLIC BLOOD PRESSURE: 88 MMHG | TEMPERATURE: 96 F

## 2022-12-19 DIAGNOSIS — J43.9 PULMONARY EMPHYSEMA, UNSPECIFIED EMPHYSEMA TYPE: ICD-10-CM

## 2022-12-19 DIAGNOSIS — Z48.02 VISIT FOR SUTURE REMOVAL: ICD-10-CM

## 2022-12-19 DIAGNOSIS — R91.1 SOLITARY PULMONARY NODULE: ICD-10-CM
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
## 2022-12-19 DIAGNOSIS — S01.01XA LACERATION OF SCALP WITHOUT FOREIGN BODY, INITIAL ENCOUNTER: ICD-10-CM

## 2022-12-19 DIAGNOSIS — R93.89 ABNORMAL CT SCAN: ICD-10-CM

## 2022-12-19 DIAGNOSIS — E04.1 THYROID NODULE: ICD-10-CM

## 2022-12-19 DIAGNOSIS — J98.4 APICAL LUNG SCARRING: ICD-10-CM

## 2022-12-19 PROCEDURE — 99499 UNLISTED E&M SERVICE: CPT | Mod: HCNC,S$GLB,, | Performed by: PHYSICIAN ASSISTANT

## 2022-12-19 PROCEDURE — 1126F AMNT PAIN NOTED NONE PRSNT: CPT | Mod: HCNC,CPTII,S$GLB, | Performed by: PHYSICIAN ASSISTANT

## 2022-12-19 PROCEDURE — 1160F RVW MEDS BY RX/DR IN RCRD: CPT | Mod: HCNC,CPTII,S$GLB, | Performed by: PHYSICIAN ASSISTANT

## 2022-12-19 PROCEDURE — 99214 OFFICE O/P EST MOD 30 MIN: CPT | Mod: HCNC,S$GLB,, | Performed by: PHYSICIAN ASSISTANT

## 2022-12-19 PROCEDURE — 1159F PR MEDICATION LIST DOCUMENTED IN MEDICAL RECORD: ICD-10-PCS | Mod: HCNC,CPTII,S$GLB, | Performed by: PHYSICIAN ASSISTANT

## 2022-12-19 PROCEDURE — 1126F PR PAIN SEVERITY QUANTIFIED, NO PAIN PRESENT: ICD-10-PCS | Mod: HCNC,CPTII,S$GLB, | Performed by: PHYSICIAN ASSISTANT

## 2022-12-19 PROCEDURE — 1159F MED LIST DOCD IN RCRD: CPT | Mod: HCNC,CPTII,S$GLB, | Performed by: PHYSICIAN ASSISTANT

## 2022-12-19 PROCEDURE — 99214 PR OFFICE/OUTPT VISIT, EST, LEVL IV, 30-39 MIN: ICD-10-PCS | Mod: HCNC,S$GLB,, | Performed by: PHYSICIAN ASSISTANT

## 2022-12-19 PROCEDURE — 3288F FALL RISK ASSESSMENT DOCD: CPT | Mod: HCNC,CPTII,S$GLB, | Performed by: PHYSICIAN ASSISTANT

## 2022-12-19 PROCEDURE — 1100F PTFALLS ASSESS-DOCD GE2>/YR: CPT | Mod: HCNC,CPTII,S$GLB, | Performed by: PHYSICIAN ASSISTANT

## 2022-12-19 PROCEDURE — 1160F PR REVIEW ALL MEDS BY PRESCRIBER/CLIN PHARMACIST DOCUMENTED: ICD-10-PCS | Mod: HCNC,CPTII,S$GLB, | Performed by: PHYSICIAN ASSISTANT

## 2022-12-19 PROCEDURE — 1100F PR PT FALLS ASSESS DOC 2+ FALLS/FALL W/INJURY/YR: ICD-10-PCS | Mod: HCNC,CPTII,S$GLB, | Performed by: PHYSICIAN ASSISTANT

## 2022-12-19 PROCEDURE — 99999 PR PBB SHADOW E&M-EST. PATIENT-LVL IV: ICD-10-PCS | Mod: PBBFAC,HCNC,, | Performed by: PHYSICIAN ASSISTANT

## 2022-12-19 PROCEDURE — 99999 PR PBB SHADOW E&M-EST. PATIENT-LVL IV: CPT | Mod: PBBFAC,HCNC,, | Performed by: PHYSICIAN ASSISTANT

## 2022-12-19 PROCEDURE — 3288F PR FALLS RISK ASSESSMENT DOCUMENTED: ICD-10-PCS | Mod: HCNC,CPTII,S$GLB, | Performed by: PHYSICIAN ASSISTANT

## 2022-12-19 PROCEDURE — 99499 RISK ADDL DX/OHS AUDIT: ICD-10-PCS | Mod: HCNC,S$GLB,, | Performed by: PHYSICIAN ASSISTANT

## 2022-12-19 NOTE — PROGRESS NOTES
sd  Subjective:      Patient ID: Destini Augustine is a 89 y.o. female.    Chief Complaint: Suture / Staple Removal    HPI  Here today for an ER follow up after a fall and to have her 6 sutures removed from the back of her head.   Fall happened 12/7/2022 at home while exercising. Fell backwards and cut the back of her head. No LOC.   No headache, changes in vision, confusion, or dizziness. Pt doing well and all symptoms have resolved. No drainage from suture that pt has noticed. No new pain or redness.     Need to check thyroid ultrasound for abnormal lesion seen on CT.     Also pt states that her CT scan showed an abnormality on her lung that needs to be evaluated. Pt admits a history of smoking heavily. Denies any cough, fever, night sweats, involuntary weight loss, or shortness of breath.       Patient Active Problem List   Diagnosis    Osteopenia    Nonexudative senile macular degeneration of retina    Atherosclerosis of aorta    Primary open angle glaucoma of both eyes, moderate stage    Pulmonary heart disease    Atrial fibrillation, permanent    Pseudophakia of both eyes    Anticoagulated    Chronic pain of right knee    Osteoarthritis of right knee    Scapholunate ligament injury with no instability    Arthritis of left wrist    Left wrist pain    Abnormal ECG    Pulmonary hypertension    Nonrheumatic tricuspid valve regurgitation    Nonrheumatic mitral valve regurgitation         Current Outpatient Medications:     alendronate (FOSAMAX) 70 MG tablet, TAKE 1 TAB WEEKLY IN THE MORNING WITH FULL GLASS OF WATER ON EMPTY STOMACH.DO NOT LIE DOWN FOR AT LEAST 30 MINUTES AFTER, Disp: 12 tablet, Rfl: 4    apixaban (ELIQUIS) 2.5 mg Tab, Take 1 tablet (2.5 mg total) by mouth 2 (two) times daily., Disp: 180 tablet, Rfl: 3    BIOTIN ORAL, Take 1 tablet by mouth 2 (two) times daily. Once daily per patient, Disp: , Rfl:     CALCIUM CARBONATE/VITAMIN D3 (CALCIUM 600 + D,3, ORAL), Take 1 capsule by mouth 2 (two) times daily.,  Disp: , Rfl:     DOCOSAHEXANOIC ACID/EPA (FISH OIL ORAL), Take 1 capsule by mouth 2 (two) times daily. , Disp: , Rfl:     GLUCOSAMINE HCL/CHONDRO COBB A (GLUCOSAMINE-CHONDROITIN ORAL), Take 1 tablet by mouth 2 (two) times daily. , Disp: , Rfl:     latanoprost 0.005 % ophthalmic solution, INSTILL 1 DROP INTO BOTH EYES EVERY NIGHT, Disp: 3 mL, Rfl: 12    metoprolol succinate (TOPROL-XL) 25 MG 24 hr tablet, Take 1 tablet (25 mg total) by mouth once daily., Disp: 90 tablet, Rfl: 4    multivitamin (THERAGRAN) per tablet, Take 1 tablet by mouth once daily., Disp: , Rfl:     timolol maleate 0.5% (TIMOPTIC) 0.5 % Drop, Place 1 drop into the right eye every morning., Disp: 10 mL, Rfl: 4    turmeric root extract 500 mg Cap, Take by mouth. Once daily per patient, Disp: , Rfl:     Review of Systems   Constitutional:  Negative for activity change, appetite change, chills, diaphoresis, fatigue, fever and unexpected weight change.   HENT: Negative.  Negative for congestion, hearing loss, postnasal drip, rhinorrhea, sore throat, trouble swallowing and voice change.    Eyes: Negative.  Negative for visual disturbance.   Respiratory: Negative.  Negative for cough, choking, chest tightness and shortness of breath.    Cardiovascular:  Negative for chest pain, palpitations and leg swelling.   Gastrointestinal:  Negative for abdominal distention, abdominal pain, blood in stool, constipation, diarrhea, nausea and vomiting.   Endocrine: Negative for cold intolerance, heat intolerance, polydipsia and polyuria.   Genitourinary: Negative.  Negative for difficulty urinating and frequency.   Musculoskeletal:  Negative for arthralgias, back pain, gait problem, joint swelling and myalgias.   Skin:  Negative for color change, pallor, rash and wound.   Neurological:  Negative for dizziness, tremors, weakness, light-headedness, numbness and headaches.   Hematological:  Negative for adenopathy.   Psychiatric/Behavioral:  Negative for behavioral  "problems, confusion, self-injury, sleep disturbance and suicidal ideas. The patient is not nervous/anxious.    Objective:   /88 (BP Location: Right arm, Patient Position: Sitting, BP Method: Medium (Manual))   Pulse 109   Temp 96 °F (35.6 °C) (Tympanic)   Ht 5' 7" (1.702 m)   Wt 59.5 kg (131 lb 2.8 oz)   SpO2 95%   BMI 20.54 kg/m²     Physical Exam  Vitals reviewed.   Constitutional:       General: She is not in acute distress.     Appearance: Normal appearance. She is well-developed. She is not ill-appearing, toxic-appearing or diaphoretic.   HENT:      Head: Normocephalic. Laceration present.        Right Ear: External ear normal.      Left Ear: External ear normal.      Nose: Nose normal.   Eyes:      Conjunctiva/sclera: Conjunctivae normal.      Pupils: Pupils are equal, round, and reactive to light.   Cardiovascular:      Rate and Rhythm: Normal rate and regular rhythm.      Heart sounds: Normal heart sounds. No murmur heard.    No friction rub. No gallop.   Pulmonary:      Effort: Pulmonary effort is normal. No respiratory distress.      Breath sounds: Normal breath sounds. No wheezing or rales.   Chest:      Chest wall: No tenderness.   Abdominal:      Palpations: Abdomen is soft.   Musculoskeletal:         General: Normal range of motion.      Cervical back: Normal range of motion and neck supple.   Lymphadenopathy:      Cervical: No cervical adenopathy.   Skin:     General: Skin is warm and dry.      Capillary Refill: Capillary refill takes less than 2 seconds.      Findings: No rash.   Neurological:      Mental Status: She is alert and oriented to person, place, and time.      Motor: No weakness.      Coordination: Coordination normal.      Gait: Gait normal.   Psychiatric:         Mood and Affect: Mood normal.         Behavior: Behavior normal.         Thought Content: Thought content normal.         Judgment: Judgment normal.     CT Cervical Spine Without Contrast  Narrative: EXAMINATION:  CT " CERVICAL SPINE WITHOUT CONTRAST    CLINICAL HISTORY:  Polytrauma, blunt;    TECHNIQUE:  Low dose axial images, sagittal and coronal reformations were performed though the cervical spine.  Contrast was not administered.    COMPARISON:  None    FINDINGS:  Normal vertebral body heights without evidence for spondylolisthesis.  Mild degenerative joint disease.  No prevertebral soft tissue swelling.  Facet joints are congruent.    Extensive apical emphysema with apical nodular scarring.  Recommend non emergent follow-up.    Right thyroid hypodense lesion.  Decreased bone mineral density  Impression: No acute fracture or dislocation.    Mild degenerative joint disease of the spine    Right hypodense hypodense lesion.  Recommend non emergent thyroid ultrasound    Extensive apical emphysema with apical nodular scarring.  Recommend non emergent follow-up.    All CT scans   are performed using dose optimization techniques including the following: automated exposure control; adjustment of the mA and/or kV; use of iterative reconstruction technique.  Dose modulation was employed for ALARA by means of: Automated exposure control; adjustment of the mA and/or kV according to patient size (this includes techniques or standardized protocols for targeted exams where dose is matched to indication/reason for exam; i.e. extremities or head); and/or use of iterative reconstructive technique.    Electronically signed by: Nikhil Bower  Date:    12/07/2022  Time:    20:19  CT Head Without Contrast  Narrative: EXAMINATION:  CT HEAD WITHOUT CONTRAST    CLINICAL HISTORY:  Head trauma, visual loss;    TECHNIQUE:  Low dose axial CT images obtained throughout the head without intravenous contrast. Sagittal and coronal reconstructions were performed.    COMPARISON:  None.    FINDINGS:  Atrophy and chronic white matter changes.  No extra-axial blood or fluid collections.    No parenchymal mass, hemorrhage, edema or major vascular distribution  infarct.    Skull/extracranial contents (limited evaluation): No fracture. Mastoid air cells and paranasal sinuses are essentially clear.  Impression: No acute abnormality.    Atrophy and chronic white matter changes    All CT scans   are performed using dose optimization techniques including the following: automated exposure control; adjustment of the mA and/or kV; use of iterative reconstruction technique.  Dose modulation was employed for ALARA by means of: Automated exposure control; adjustment of the mA and/or kV according to patient size (this includes techniques or standardized protocols for targeted exams where dose is matched to indication/reason for exam; i.e. extremities or head); and/or use of iterative reconstructive technique.    Electronically signed by: Nikhil Bower  Date:    12/07/2022  Time:    20:15       6 sutures removed today without difficulty. Wounds are well healed. No evidence of infection.   Assessment:     1. Fall, initial encounter    2. Laceration of scalp without foreign body, initial encounter    3. Visit for suture removal    4. Thyroid nodule    5. Pulmonary emphysema, unspecified emphysema type    6. Apical lung scarring      Plan:   Fall, initial encounter    Laceration of scalp without foreign body, initial encounter  -healing well. Removed without difficulty. No infection .    Visit for suture removal    Thyroid nodule  -     US Soft Tissue Head Neck Thyroid; Future; Expected date: 12/19/2022  -incidental finding on CT scan. Will schedule thyroid ultrasound     Pulmonary emphysema, unspecified emphysema type  Comments:  CT cervical spine 12/7/2022   Apical lung scarring  -batista end over an econsult to pulmonology regarding follow up for abnormal lung findings on CT scan.   -PER  ECONSULT WITH PULM. WILL GET HER AN APT WITH PULM FOR FURTHER EVALUATION AND ORDER CT OF THE CHEST    Follow up if symptoms worsen or fail to improve.

## 2022-12-20 ENCOUNTER — E-CONSULT (OUTPATIENT)
Dept: SLEEP MEDICINE | Facility: CLINIC | Age: 87
End: 2022-12-20
Payer: MEDICARE

## 2022-12-20 DIAGNOSIS — J98.4 APICAL LUNG SCARRING: ICD-10-CM

## 2022-12-20 DIAGNOSIS — J43.2 CENTRILOBULAR EMPHYSEMA: ICD-10-CM

## 2022-12-20 PROBLEM — J43.9 PULMONARY EMPHYSEMA: Status: ACTIVE | Noted: 2022-12-20

## 2022-12-20 PROCEDURE — 99499 NO LOS: ICD-10-PCS | Mod: S$GLB,,, | Performed by: INTERNAL MEDICINE

## 2022-12-20 PROCEDURE — 99499 UNLISTED E&M SERVICE: CPT | Mod: S$GLB,,, | Performed by: INTERNAL MEDICINE

## 2022-12-20 NOTE — PROGRESS NOTES
[facility] 3rd Fl  Response for E-Consult     Patient Name: Destini Augustine  MRN: [id number]  Primary Care Provider: Glenn Love MD   Requesting Provider: Krista Leon*      Findings:   Incidental findings  Emphysema and scarring    4 pack year smoking  [facility]    Will need further non emergent assessment with following  Complete pulmonary history  Chest CT  Complete PFT    Referal to pulmonary medicine    I did not speak to the requesting provider verbally about this.     Total time of Consultation: 15 minute    Percentage of time spent on verbal/written discussion: 100%     *This eConsult is based on the clinical data available to me and is furnished without benefit of a physical examination. The eConsult will need to be interpreted in light of any clinical issues or changes in patient status not available to me at the time of filing this eConsults. Significant changes in patient condition or level of acuity should result in immediate formal consultation and reevaluation. Please alert me if you have further questions.    Thank you for your consult.     Elgin Bae MD  The [address]      Problem List Items Addressed This Visit       Pulmonary emphysema    Apical lung scarring

## 2022-12-22 ENCOUNTER — PATIENT MESSAGE (OUTPATIENT)
Dept: PULMONOLOGY | Facility: CLINIC | Age: 87
End: 2022-12-22
Payer: MEDICARE

## 2022-12-28 ENCOUNTER — PATIENT MESSAGE (OUTPATIENT)
Dept: INTERNAL MEDICINE | Facility: CLINIC | Age: 87
End: 2022-12-28
Payer: MEDICARE

## 2022-12-28 ENCOUNTER — HOSPITAL ENCOUNTER (OUTPATIENT)
Dept: RADIOLOGY | Facility: HOSPITAL | Age: 87
Discharge: HOME | End: 2022-12-28
Attending: PHYSICIAN ASSISTANT
Payer: MEDICARE

## 2022-12-28 DIAGNOSIS — E04.1 THYROID NODULE: ICD-10-CM

## 2022-12-28 DIAGNOSIS — E04.2 MULTIPLE THYROID NODULES: ICD-10-CM

## 2022-12-28 PROCEDURE — 76536 US EXAM OF HEAD AND NECK: CPT | Mod: 26,HCNC,, | Performed by: RADIOLOGY

## 2022-12-28 PROCEDURE — 76536 US EXAM OF HEAD AND NECK: CPT | Mod: TC,HCNC

## 2022-12-28 PROCEDURE — 76536 US SOFT TISSUE HEAD NECK THYROID: ICD-10-PCS | Mod: 26,HCNC,, | Performed by: RADIOLOGY

## 2023-01-04 ENCOUNTER — PATIENT MESSAGE (OUTPATIENT)
Dept: INTERNAL MEDICINE | Facility: CLINIC | Age: 88
End: 2023-01-04
Payer: MEDICARE

## 2023-01-04 ENCOUNTER — TELEPHONE (OUTPATIENT)
Dept: ADMINISTRATIVE | Facility: HOSPITAL | Age: 88
End: 2023-01-04
Payer: MEDICARE

## 2023-01-04 ENCOUNTER — TELEPHONE (OUTPATIENT)
Dept: INTERNAL MEDICINE | Facility: CLINIC | Age: 88
End: 2023-01-04
Payer: MEDICARE

## 2023-01-04 NOTE — TELEPHONE ENCOUNTER
Called pt regarding a missed call. Informed pt that call came from [facility] and that a message will be sent to the department asking them to return the call. Verbalized understanding.//ddw

## 2023-01-30 ENCOUNTER — OFFICE VISIT (OUTPATIENT)
Dept: OPHTHALMOLOGY | Facility: CLINIC | Age: 88
End: 2023-01-30
Payer: MEDICARE

## 2023-01-30 DIAGNOSIS — Z96.1 PSEUDOPHAKIA OF BOTH EYES: ICD-10-CM

## 2023-01-30 DIAGNOSIS — H40.1132 PRIMARY OPEN ANGLE GLAUCOMA OF BOTH EYES, MODERATE STAGE: Primary | ICD-10-CM

## 2023-01-30 DIAGNOSIS — H35.3131 EARLY DRY STAGE NONEXUDATIVE AGE-RELATED MACULAR DEGENERATION OF BOTH EYES: ICD-10-CM

## 2023-01-30 DIAGNOSIS — H52.7 REFRACTIVE ERROR: ICD-10-CM

## 2023-01-30 PROCEDURE — 92133 POSTERIOR SEGMENT OCT OPTIC NERVE(OCULAR COHERENCE TOMOGRAPHY) - OU - BOTH EYES: ICD-10-PCS | Mod: HCNC,S$GLB,, | Performed by: OPHTHALMOLOGY

## 2023-01-30 PROCEDURE — 92015 PR REFRACTION: ICD-10-PCS | Mod: HCNC,S$GLB,, | Performed by: OPHTHALMOLOGY

## 2023-01-30 PROCEDURE — 99999 PR PBB SHADOW E&M-EST. PATIENT-LVL I: CPT | Mod: PBBFAC,HCNC,, | Performed by: OPHTHALMOLOGY

## 2023-01-30 PROCEDURE — 1160F RVW MEDS BY RX/DR IN RCRD: CPT | Mod: HCNC,CPTII,S$GLB, | Performed by: OPHTHALMOLOGY

## 2023-01-30 PROCEDURE — 99999 PR PBB SHADOW E&M-EST. PATIENT-LVL I: ICD-10-PCS | Mod: PBBFAC,HCNC,, | Performed by: OPHTHALMOLOGY

## 2023-01-30 PROCEDURE — 92133 CPTRZD OPH DX IMG PST SGM ON: CPT | Mod: HCNC,S$GLB,, | Performed by: OPHTHALMOLOGY

## 2023-01-30 PROCEDURE — 92015 DETERMINE REFRACTIVE STATE: CPT | Mod: HCNC,S$GLB,, | Performed by: OPHTHALMOLOGY

## 2023-01-30 PROCEDURE — 92014 COMPRE OPH EXAM EST PT 1/>: CPT | Mod: HCNC,S$GLB,, | Performed by: OPHTHALMOLOGY

## 2023-01-30 PROCEDURE — 1159F MED LIST DOCD IN RCRD: CPT | Mod: HCNC,CPTII,S$GLB, | Performed by: OPHTHALMOLOGY

## 2023-01-30 PROCEDURE — 1159F PR MEDICATION LIST DOCUMENTED IN MEDICAL RECORD: ICD-10-PCS | Mod: HCNC,CPTII,S$GLB, | Performed by: OPHTHALMOLOGY

## 2023-01-30 PROCEDURE — 1160F PR REVIEW ALL MEDS BY PRESCRIBER/CLIN PHARMACIST DOCUMENTED: ICD-10-PCS | Mod: HCNC,CPTII,S$GLB, | Performed by: OPHTHALMOLOGY

## 2023-01-30 PROCEDURE — 92014 PR EYE EXAM, EST PATIENT,COMPREHESV: ICD-10-PCS | Mod: HCNC,S$GLB,, | Performed by: OPHTHALMOLOGY

## 2023-01-30 NOTE — PROGRESS NOTES
SUBJECTIVE  Destini Augustine is 89 y.o. female  Uncorrected distance visual acuity was 20/30 in the right eye and 20/30-2 in the left eye.   Chief Complaint   Patient presents with    Glaucoma     4 month GOCT and Dilation          HPI     Glaucoma     Additional comments: 4 month GOCT and Dilation           Comments    States that her vision is and that she has been     1. Mod COAG OD>OS (Initial 30/26) goal = 17   2. PCIOL OD w/ ECP & Toric IOL 5/20/09   PCIOL OS w/ ECP & Toric IOL 4/22/09   3. Mild Dry AMD   4. PCO OS   5. Pseudophakia OU      Latanoprost QHS OU   Timolol BID OD     Ocuvite   HAG (does not do)   O3FO  WC/LS            Last edited by Harini Rubi on 1/30/2023 10:39 AM.         Assessment /Plan :  1. Primary open angle glaucoma of both eyes, moderate stage Doing well, intraocular pressure (IOP) within acceptable range relative to target IOP and no evidence of progression. Continue current treatment. Reviewed importance of continued compliance with treatment and follow up.      Patient instructed to continue using the following glaucoma medication as follows:  Latanoprost one drop in each eye nightly and Timolol one drop in the right eye every 12 hours    Return to clinic in 4 months  or as needed.  With IOP Check     2. Pseudophakia of both eyes  -- Condition stable, no therapeutic change required. Monitoring routinely.     3. 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 Monitoring routinely.     4.      Refractive Error - distance rx

## 2023-02-01 ENCOUNTER — HOSPITAL ENCOUNTER (OUTPATIENT)
Dept: RADIOLOGY | Facility: HOSPITAL | Age: 88
Discharge: HOME | End: 2023-02-01
Attending: INTERNAL MEDICINE
Payer: MEDICARE

## 2023-02-01 ENCOUNTER — OFFICE VISIT (OUTPATIENT)
Dept: PULMONOLOGY | Facility: CLINIC | Age: 88
End: 2023-02-01
Payer: MEDICARE

## 2023-02-01 VITALS
BODY MASS INDEX: 20.52 KG/M2 | WEIGHT: 130.75 LBS | DIASTOLIC BLOOD PRESSURE: 70 MMHG | RESPIRATION RATE: 20 BRPM | HEIGHT: 67 IN | HEART RATE: 65 BPM | OXYGEN SATURATION: 95 % | SYSTOLIC BLOOD PRESSURE: 114 MMHG

## 2023-02-01 DIAGNOSIS — R91.1 SOLITARY PULMONARY NODULE: Primary | ICD-10-CM

## 2023-02-01 DIAGNOSIS — R91.1 SOLITARY PULMONARY NODULE: ICD-10-CM

## 2023-02-01 DIAGNOSIS — R93.89 ABNORMAL CT SCAN: ICD-10-CM

## 2023-02-01 PROCEDURE — 71250 CT THORAX DX C-: CPT | Mod: 26,HCNC,, | Performed by: RADIOLOGY

## 2023-02-01 PROCEDURE — 99203 OFFICE O/P NEW LOW 30 MIN: CPT | Mod: HCNC,S$GLB,, | Performed by: INTERNAL MEDICINE

## 2023-02-01 PROCEDURE — 3288F PR FALLS RISK ASSESSMENT DOCUMENTED: ICD-10-PCS | Mod: HCNC,CPTII,S$GLB, | Performed by: INTERNAL MEDICINE

## 2023-02-01 PROCEDURE — 1100F PTFALLS ASSESS-DOCD GE2>/YR: CPT | Mod: HCNC,CPTII,S$GLB, | Performed by: INTERNAL MEDICINE

## 2023-02-01 PROCEDURE — 71250 CT THORAX DX C-: CPT | Mod: TC,HCNC

## 2023-02-01 PROCEDURE — 3288F FALL RISK ASSESSMENT DOCD: CPT | Mod: HCNC,CPTII,S$GLB, | Performed by: INTERNAL MEDICINE

## 2023-02-01 PROCEDURE — 1160F RVW MEDS BY RX/DR IN RCRD: CPT | Mod: HCNC,CPTII,S$GLB, | Performed by: INTERNAL MEDICINE

## 2023-02-01 PROCEDURE — 1159F PR MEDICATION LIST DOCUMENTED IN MEDICAL RECORD: ICD-10-PCS | Mod: HCNC,CPTII,S$GLB, | Performed by: INTERNAL MEDICINE

## 2023-02-01 PROCEDURE — 1159F MED LIST DOCD IN RCRD: CPT | Mod: HCNC,CPTII,S$GLB, | Performed by: INTERNAL MEDICINE

## 2023-02-01 PROCEDURE — 99999 PR PBB SHADOW E&M-EST. PATIENT-LVL IV: CPT | Mod: PBBFAC,HCNC,, | Performed by: INTERNAL MEDICINE

## 2023-02-01 PROCEDURE — 1160F PR REVIEW ALL MEDS BY PRESCRIBER/CLIN PHARMACIST DOCUMENTED: ICD-10-PCS | Mod: HCNC,CPTII,S$GLB, | Performed by: INTERNAL MEDICINE

## 2023-02-01 PROCEDURE — 99999 PR PBB SHADOW E&M-EST. PATIENT-LVL IV: ICD-10-PCS | Mod: PBBFAC,HCNC,, | Performed by: INTERNAL MEDICINE

## 2023-02-01 PROCEDURE — 1100F PR PT FALLS ASSESS DOC 2+ FALLS/FALL W/INJURY/YR: ICD-10-PCS | Mod: HCNC,CPTII,S$GLB, | Performed by: INTERNAL MEDICINE

## 2023-02-01 PROCEDURE — 99203 PR OFFICE/OUTPT VISIT, NEW, LEVL III, 30-44 MIN: ICD-10-PCS | Mod: HCNC,S$GLB,, | Performed by: INTERNAL MEDICINE

## 2023-02-01 PROCEDURE — 71250 CT CHEST WITHOUT CONTRAST: ICD-10-PCS | Mod: 26,HCNC,, | Performed by: RADIOLOGY

## 2023-02-01 NOTE — PROGRESS NOTES
Subjective:       Patient ID: Destini Augustine is a 89 y.o. female.    Chief Complaint: Abnormal Ct Scan    Abnormal Ct Scan      Patient is an 89-year-old female with a past history of osteoporosis, former cigarette smoker quit many years ago who had a fall recently with head laceration which required sutures.  As consequent to that injury, she had a CT of the head which was unremarkable.  She had a CT of the cervical spine which contained upper cuts of the lungs which were interpreted as biapical scarring versus nodules as well as emphysema.  She is here today for that reason.  She has no pulmonary complaints.  She specifically denies any dyspnea on exertion, cough, wheezing or chest pain.    Past Medical History:   Diagnosis Date    Arthritis     knees, hands    Atrial fibrillation     Basal cell carcinoma     face, right thigh    COAG (chronic open-angle glaucoma) - Both Eyes 2013    Ex-smoker     Hamartoma     mandible    Macular degeneration     Osteopenia      shama     Pulmonary heart disease     Scapholunate ligament injury with no instability     Scoliosis 11/30/15    lumbar x-ray     Past Surgical History:   Procedure Laterality Date    APPENDECTOMY      CATARACT EXTRACTION Bilateral     DR. Singleton    hysterectomy      complete    HYSTERECTOMY      about 39yrs old, partial    MOUTH SURGERY Bilateral 02/10/2020    rectocele and cystocele   approx    TONSILLECTOMY, ADENOIDECTOMY  5 y/o     Social History     Tobacco Use    Smoking status: Former     Packs/day: 1.50     Years: 30.00     Pack years: 45.00     Types: Cigarettes     Quit date: 1980     Years since quittin.0    Smokeless tobacco: Never   Substance Use Topics    Alcohol use: Not Currently    Drug use: No     Family History   Problem Relation Age of Onset    Cataracts Mother     Hypertension Mother     Stroke Mother     Cancer Father         colon    Thyroid disease Father     Cancer Brother         lung     Tuberculosis Brother     Blindness Neg Hx     Diabetes Neg Hx     Macular degeneration Neg Hx     Retinal detachment Neg Hx     Strabismus Neg Hx        Review of Systems  as per history of present illness otherwise negative    Objective:      Physical Exam   Constitutional: She is oriented to person, place, and time. She appears well-developed and well-nourished.   HENT:   Head: Normocephalic.   Neck: No JVD present.   Cardiovascular: Normal rate and regular rhythm.   No murmur heard.  Pulmonary/Chest: Normal expansion and breath sounds normal.   Abdominal: Soft.   Musculoskeletal:         General: No edema.      Cervical back: Neck supple.   Neurological: She is alert and oriented to person, place, and time.   Psychiatric: She has a normal mood and affect.   Nursing note and vitals reviewed.        Assessment:       1. Solitary pulmonary nodule    2. Abnormal CT scan          Orders Placed This Encounter   Procedures    CT Chest Without Contrast     Standing Status:   Future     Standing Expiration Date:   2/1/2024     Order Specific Question:   May the Radiologist modify the order per protocol to meet the clinical needs of the patient?     Answer:   Yes     I reviewed CT of the cervical spine with the findings as described in the history of present illness    Plan:       We will get full chest CT without contrast today.  As long as findings are consistent with biapical pleural capping and apical scarring will likely recommend repeat CT in 6 months for stability.  I will notify the patient of results once available.  I discussed this with her in detail and she voiced understanding and agreement

## 2023-02-03 DIAGNOSIS — R91.1 SOLITARY PULMONARY NODULE: Primary | ICD-10-CM

## 2023-02-07 DIAGNOSIS — Z00.00 ENCOUNTER FOR MEDICARE ANNUAL WELLNESS EXAM: ICD-10-CM

## 2023-02-09 DIAGNOSIS — Z00.00 ENCOUNTER FOR MEDICARE ANNUAL WELLNESS EXAM: ICD-10-CM

## 2023-02-15 ENCOUNTER — TELEPHONE (OUTPATIENT)
Dept: INTERNAL MEDICINE | Facility: CLINIC | Age: 88
End: 2023-02-15
Payer: MEDICARE

## 2023-02-15 DIAGNOSIS — Z12.31 SCREENING MAMMOGRAM FOR HIGH-RISK PATIENT: Primary | ICD-10-CM

## 2023-02-15 NOTE — TELEPHONE ENCOUNTER
----- Message from Festus Harkins sent at 2/15/2023  3:18 PM CST -----  Contact: self  Pt is asking for an  return call in reference to having orders put in to have mammogram done, also have questions about paper work she is needing filled out please call back at [phone] Thx CJ

## 2023-02-15 NOTE — TELEPHONE ENCOUNTER
I spoke with the patient she stated that it is time for her yearly mammogram. I scheduled the patient a mammogram on 4/12/23 at 2:00 pm. Reminder notice mailed out to patient. Also, patient need a medical examiner certification of mobility form filled out. Patient informed that paperwork is ready for Dr Love.

## 2023-04-12 ENCOUNTER — HOSPITAL ENCOUNTER (OUTPATIENT)
Dept: RADIOLOGY | Facility: HOSPITAL | Age: 88
Discharge: HOME | End: 2023-04-12
Attending: FAMILY MEDICINE
Payer: MEDICARE

## 2023-04-12 DIAGNOSIS — Z12.31 SCREENING MAMMOGRAM FOR HIGH-RISK PATIENT: ICD-10-CM

## 2023-04-12 PROCEDURE — 77067 MAMMO DIGITAL SCREENING BILAT WITH TOMO: ICD-10-PCS | Mod: 26,HCNC,, | Performed by: RADIOLOGY

## 2023-04-12 PROCEDURE — 77067 SCR MAMMO BI INCL CAD: CPT | Mod: TC,HCNC

## 2023-04-12 PROCEDURE — 77063 BREAST TOMOSYNTHESIS BI: CPT | Mod: 26,HCNC,, | Performed by: RADIOLOGY

## 2023-04-12 PROCEDURE — 77067 SCR MAMMO BI INCL CAD: CPT | Mod: 26,HCNC,, | Performed by: RADIOLOGY

## 2023-04-12 PROCEDURE — 77063 MAMMO DIGITAL SCREENING BILAT WITH TOMO: ICD-10-PCS | Mod: 26,HCNC,, | Performed by: RADIOLOGY

## 2023-05-12 ENCOUNTER — TELEPHONE (OUTPATIENT)
Dept: ADMINISTRATIVE | Facility: CLINIC | Age: 88
End: 2023-05-12
Payer: MEDICARE

## 2023-05-12 NOTE — TELEPHONE ENCOUNTER
Called pt, informed pt I was calling to remind pt of her in office EAWV on 5/15/23; clinic location provided to patient; pt confirmed appointment

## 2023-05-15 ENCOUNTER — OFFICE VISIT (OUTPATIENT)
Dept: INTERNAL MEDICINE | Facility: CLINIC | Age: 88
End: 2023-05-15
Payer: MEDICARE

## 2023-05-15 VITALS
HEART RATE: 92 BPM | BODY MASS INDEX: 20.38 KG/M2 | WEIGHT: 129.88 LBS | TEMPERATURE: 99 F | SYSTOLIC BLOOD PRESSURE: 130 MMHG | OXYGEN SATURATION: 95 % | DIASTOLIC BLOOD PRESSURE: 80 MMHG | HEIGHT: 67 IN

## 2023-05-15 DIAGNOSIS — H40.1132 PRIMARY OPEN ANGLE GLAUCOMA OF BOTH EYES, MODERATE STAGE: ICD-10-CM

## 2023-05-15 DIAGNOSIS — J43.9 PULMONARY EMPHYSEMA, UNSPECIFIED EMPHYSEMA TYPE: ICD-10-CM

## 2023-05-15 DIAGNOSIS — E04.2 MULTIPLE THYROID NODULES: ICD-10-CM

## 2023-05-15 DIAGNOSIS — I70.0 ATHEROSCLEROSIS OF AORTA: ICD-10-CM

## 2023-05-15 DIAGNOSIS — M85.89 OSTEOPENIA OF MULTIPLE SITES: ICD-10-CM

## 2023-05-15 DIAGNOSIS — I27.20 PULMONARY HYPERTENSION: ICD-10-CM

## 2023-05-15 DIAGNOSIS — D68.69 OTHER THROMBOPHILIA: ICD-10-CM

## 2023-05-15 DIAGNOSIS — R26.9 ABNORMALITY OF GAIT AND MOBILITY: ICD-10-CM

## 2023-05-15 DIAGNOSIS — I27.9 PULMONARY HEART DISEASE: ICD-10-CM

## 2023-05-15 DIAGNOSIS — Z79.01 ANTICOAGULATED: ICD-10-CM

## 2023-05-15 DIAGNOSIS — Z00.00 ENCOUNTER FOR MEDICARE ANNUAL WELLNESS EXAM: Primary | ICD-10-CM

## 2023-05-15 DIAGNOSIS — I48.21 ATRIAL FIBRILLATION, PERMANENT: ICD-10-CM

## 2023-05-15 PROCEDURE — 99999 PR PBB SHADOW E&M-EST. PATIENT-LVL IV: CPT | Mod: PBBFAC,,, | Performed by: PHYSICIAN ASSISTANT

## 2023-05-15 PROCEDURE — 3288F PR FALLS RISK ASSESSMENT DOCUMENTED: ICD-10-PCS | Mod: HCNC,CPTII,, | Performed by: PHYSICIAN ASSISTANT

## 2023-05-15 PROCEDURE — 99214 OFFICE O/P EST MOD 30 MIN: CPT | Mod: HCNC | Performed by: PHYSICIAN ASSISTANT

## 2023-05-15 PROCEDURE — 1126F AMNT PAIN NOTED NONE PRSNT: CPT | Mod: HCNC,CPTII,, | Performed by: PHYSICIAN ASSISTANT

## 2023-05-15 PROCEDURE — 1159F MED LIST DOCD IN RCRD: CPT | Mod: HCNC,CPTII,, | Performed by: PHYSICIAN ASSISTANT

## 2023-05-15 PROCEDURE — 1101F PR PT FALLS ASSESS DOC 0-1 FALLS W/OUT INJ PAST YR: ICD-10-PCS | Mod: HCNC,CPTII,, | Performed by: PHYSICIAN ASSISTANT

## 2023-05-15 PROCEDURE — 1101F PT FALLS ASSESS-DOCD LE1/YR: CPT | Mod: HCNC,CPTII,, | Performed by: PHYSICIAN ASSISTANT

## 2023-05-15 PROCEDURE — 1160F PR REVIEW ALL MEDS BY PRESCRIBER/CLIN PHARMACIST DOCUMENTED: ICD-10-PCS | Mod: HCNC,CPTII,, | Performed by: PHYSICIAN ASSISTANT

## 2023-05-15 PROCEDURE — 3288F FALL RISK ASSESSMENT DOCD: CPT | Mod: HCNC,CPTII,, | Performed by: PHYSICIAN ASSISTANT

## 2023-05-15 PROCEDURE — 99999 PR PBB SHADOW E&M-EST. PATIENT-LVL IV: ICD-10-PCS | Mod: PBBFAC,,, | Performed by: PHYSICIAN ASSISTANT

## 2023-05-15 PROCEDURE — G0439 PR MEDICARE ANNUAL WELLNESS SUBSEQUENT VISIT: ICD-10-PCS | Mod: HCNC,,, | Performed by: PHYSICIAN ASSISTANT

## 2023-05-15 PROCEDURE — 1126F PR PAIN SEVERITY QUANTIFIED, NO PAIN PRESENT: ICD-10-PCS | Mod: HCNC,CPTII,, | Performed by: PHYSICIAN ASSISTANT

## 2023-05-15 PROCEDURE — 1159F PR MEDICATION LIST DOCUMENTED IN MEDICAL RECORD: ICD-10-PCS | Mod: HCNC,CPTII,, | Performed by: PHYSICIAN ASSISTANT

## 2023-05-15 PROCEDURE — G0439 PPPS, SUBSEQ VISIT: HCPCS | Mod: HCNC,,, | Performed by: PHYSICIAN ASSISTANT

## 2023-05-15 PROCEDURE — 1160F RVW MEDS BY RX/DR IN RCRD: CPT | Mod: HCNC,CPTII,, | Performed by: PHYSICIAN ASSISTANT

## 2023-05-15 NOTE — PATIENT INSTRUCTIONS
Counseling and Referral of Other Preventative  (Italic type indicates deductible and co-insurance are waived)    Patient Name: Destini Augustine  Today's Date: 5/15/2023    Health Maintenance       Date Due Completion Date    COVID-19 Vaccine (4 - Booster for Pfizer series) 01/07/2022 11/12/2021    TETANUS VACCINE 08/01/2022 8/1/2012    Lipid Panel 08/12/2026 8/12/2021        No orders of the defined types were placed in this encounter.    The following information is provided to all patients.  This information is to help you find resources for any of the problems found today that may be affecting your health:                Living healthy guide: www.[facility].[place].[facility]      Understanding Diabetes: www.diabetes.org      Eating healthy: www.cdc.gov/healthyweight      CDC home safety checklist: www.cdc.gov/steadi/patient.html      Agency on Aging: www.goea.[place].[facility]      Alcoholics anonymous (AA): www.aa.org      Physical Activity: www.deidra.nih.gov/gf4vzib      Tobacco use: www.quitwithusla.org

## 2023-05-15 NOTE — PROGRESS NOTES
"  Destini Augustine presented for a  Medicare AWV and comprehensive Health Risk Assessment today. The following components were reviewed and updated:    Medical history  Family History  Social history  Allergies and Current Medications  Health Risk Assessment  Health Maintenance  Care Team         ** See Completed Assessments for Annual Wellness Visit within the encounter summary.**         The following assessments were completed:  Living Situation  CAGE  Depression Screening  Timed Get Up and Go  Whisper Test  Cognitive Function Screening  Nutrition Screening  ADL Screening  PAQ Screening        Vitals:    05/15/23 1415   BP: 130/80   BP Location: Left arm   Patient Position: Sitting   BP Method: Medium (Manual)   Pulse: 92   Temp: 98.6 °F (37 °C)   TempSrc: Tympanic   SpO2: 95%   Weight: 58.9 kg (129 lb 13.6 oz)   Height: 5' 7" (1.702 m)     Body mass index is 20.34 kg/m².  Physical Exam  Vitals and nursing note reviewed.   Constitutional:       General: She is not in acute distress.     Appearance: Normal appearance. She is well-developed. She is not ill-appearing, toxic-appearing or diaphoretic.   HENT:      Head: Normocephalic and atraumatic.   Cardiovascular:      Rate and Rhythm: Normal rate and regular rhythm.      Heart sounds: Normal heart sounds. No murmur heard.    No friction rub. No gallop.   Pulmonary:      Effort: Pulmonary effort is normal. No respiratory distress.      Breath sounds: Normal breath sounds. No wheezing or rales.   Musculoskeletal:         General: Normal range of motion.   Skin:     General: Skin is warm.      Capillary Refill: Capillary refill takes less than 2 seconds.      Findings: No rash.   Neurological:      Mental Status: She is alert and oriented to person, place, and time.      Motor: No weakness.      Gait: Gait normal.   Psychiatric:         Mood and Affect: Mood normal.         Behavior: Behavior normal.         Thought Content: Thought content normal.         Judgment: " Judgment normal.               Diagnoses and health risks identified today and associated recommendations/orders:    1. Encounter for Medicare annual wellness exam  Review for opioid use disorders: Patient does not use opioids     Review for Substance Use Disorders: Patient does not use substance     - Ambulatory Referral/Consult to Enhanced Annual Wellness Visit (eAWV)    2. Abnormality of gait and mobility  -Stable and controlled. Continue current treatment plan as previously prescribed with your PCP.     3. Other thrombophilia  Lab Results   Component Value Date    WBC 6.04 12/07/2022    HGB 13.9 12/07/2022    HCT 41.4 12/07/2022    MCV 88 12/07/2022     12/07/2022     ]  -Stable and controlled. Continue current treatment plan as previously prescribed with your hematologist.      4. Pulmonary hypertension  -TTE 5/16/2023  -Stable and controlled. Continue current treatment plan as previously prescribed with your cardiologist.    5. Pulmonary heart disease  -TTE 5/16/2023  -Stable and controlled. Continue current treatment plan as previously prescribed with your cardiologist.    6. Atrial fibrillation, permanent  -Stable and controlled on eliquis and metoprolol. Continue current treatment plan as previously prescribed with your cardiologist.     7. Atherosclerosis of aorta  -Stable and controlled. Continue current treatment plan as previously prescribed with your PCP.   -CT 2/1/2023    8. Pulmonary emphysema, unspecified emphysema type  -CT 2/1/2023  -Stable and controlled. Continue current treatment plan as previously prescribed with your pulmonologist    9. Primary open angle glaucoma of both eyes, moderate stage  -Stable and controlled. Continue current treatment plan as previously prescribed with your ophthalmologist.    10. Osteopenia of multiple sites  -DEXA 11/10/2020  -Stable and controlled on fosamax. Continue current treatment plan as previously prescribed with your PCP.       11. Multiple thyroid  nodules  -u/s 12/28/2022  -Stable and being monitored. Continue current treatment plan as previously prescribed with your PCP.       12. Anticoagulated  -Stable and controlled on eliquis. Continue current treatment plan as previously prescribed with your PCP.         Provided Destini with a 5-10 year written screening schedule and personal prevention plan. Recommendations were developed using the USPSTF age appropriate recommendations. Education, counseling, and referrals were provided as needed. After Visit Summary printed and given to patient which includes a list of additional screenings\tests needed.    Follow up in about 1 year (around 5/15/2024), or if symptoms worsen or fail to improve.    Krista Leon PA-C  I offered to discuss advanced care planning, including how to pick a person who would make decisions for you if you were unable to make them for yourself, called a health care power of , and what kind of decisions you might make such as use of life sustaining treatments such as ventilators and tube feeding when faced with a life limiting illness recorded on a living will that they will need to know. (How you want to be cared for as you near the end of your natural life)     X Patient is interested in learning more about how to make advanced directives.  I provided them paperwork and offered to discuss this with them.   pain/grossly assessed due to

## 2023-05-16 ENCOUNTER — HOSPITAL ENCOUNTER (OUTPATIENT)
Dept: CARDIOLOGY | Facility: HOSPITAL | Age: 88
Discharge: HOME | End: 2023-05-16
Attending: INTERNAL MEDICINE
Payer: MEDICARE

## 2023-05-16 VITALS
HEIGHT: 67 IN | BODY MASS INDEX: 20.4 KG/M2 | DIASTOLIC BLOOD PRESSURE: 70 MMHG | SYSTOLIC BLOOD PRESSURE: 114 MMHG | WEIGHT: 130 LBS

## 2023-05-16 DIAGNOSIS — I27.20 PULMONARY HYPERTENSION: ICD-10-CM

## 2023-05-16 DIAGNOSIS — R94.31 ABNORMAL ECG: ICD-10-CM

## 2023-05-16 DIAGNOSIS — I48.21 ATRIAL FIBRILLATION, PERMANENT: ICD-10-CM

## 2023-05-16 DIAGNOSIS — Z79.01 ANTICOAGULATED: ICD-10-CM

## 2023-05-16 DIAGNOSIS — I34.0 NONRHEUMATIC MITRAL VALVE REGURGITATION: ICD-10-CM

## 2023-05-16 DIAGNOSIS — I36.1 NONRHEUMATIC TRICUSPID VALVE REGURGITATION: ICD-10-CM

## 2023-05-16 DIAGNOSIS — I48.19 ATRIAL FIBRILLATION, PERSISTENT: ICD-10-CM

## 2023-05-16 LAB
AORTIC ROOT ANNULUS: 3.13 CM
ASCENDING AORTA: 3.16 CM
AV INDEX (PROSTH): 0.96
AV MEAN GRADIENT: 2 MMHG
AV PEAK GRADIENT: 4 MMHG
AV REGURGITATION PRESSURE HALF TIME: 583.52 MS
AV VALVE AREA: 3.4 CM2
AV VELOCITY RATIO: 0.75
BSA FOR ECHO PROCEDURE: 1.67 M2
CV ECHO LV RWT: 0.52 CM
DOP CALC AO PEAK VEL: 1.03 M/S
DOP CALC AO VTI: 18.1 CM
DOP CALC LVOT AREA: 3.6 CM2
DOP CALC LVOT DIAMETER: 2.13 CM
DOP CALC LVOT PEAK VEL: 0.77 M/S
DOP CALC LVOT STROKE VOLUME: 61.61 CM3
DOP CALC RVOT PEAK VEL: 0.38 M/S
DOP CALC RVOT VTI: 8.1 CM
DOP CALCLVOT PEAK VEL VTI: 17.3 CM
ECHO LV POSTERIOR WALL: 1 CM (ref 0.6–1.1)
EJECTION FRACTION: 60 %
FRACTIONAL SHORTENING: 32 % (ref 28–44)
INTERVENTRICULAR SEPTUM: 1.05 CM (ref 0.6–1.1)
IVC DIAMETER: 3.06 CM
IVRT: 68.51 MSEC
LA MAJOR: 5.59 CM
LA MINOR: 6.52 CM
LA WIDTH: 5 CM
LEFT ATRIUM SIZE: 4.84 CM
LEFT ATRIUM VOLUME INDEX MOD: 62.2 ML/M2
LEFT ATRIUM VOLUME INDEX: 73.7 ML/M2
LEFT ATRIUM VOLUME MOD: 104.48 CM3
LEFT ATRIUM VOLUME: 123.82 CM3
LEFT INTERNAL DIMENSION IN SYSTOLE: 2.63 CM (ref 2.1–4)
LEFT VENTRICLE DIASTOLIC VOLUME INDEX: 38.08 ML/M2
LEFT VENTRICLE DIASTOLIC VOLUME: 63.97 ML
LEFT VENTRICLE MASS INDEX: 74 G/M2
LEFT VENTRICLE SYSTOLIC VOLUME INDEX: 15.1 ML/M2
LEFT VENTRICLE SYSTOLIC VOLUME: 25.42 ML
LEFT VENTRICULAR INTERNAL DIMENSION IN DIASTOLE: 3.85 CM (ref 3.5–6)
LEFT VENTRICULAR MASS: 124 G
LVOT MG: 1.23 MMHG
LVOT MV: 0.51 CM/S
MR PISA EROA: 0.24 CM2
PISA AR MAX VEL: 3.65 M/S
PISA MRMAX VEL: 5.39 M/S
PISA RADIUS: 0.77 CM
PISA TR MAX VEL: 4.25 M/S
PISA VN NYQUIST MS: 0.35 M/S
PISA VN NYQUIST: 0.35 M/S
PV MEAN GRADIENT: 0.3 MMHG
PV PEAK VELOCITY: 0.63 CM/S
RA MAJOR: 6.27 CM
RA PRESSURE: 15 MMHG
RA WIDTH: 4.81 CM
RIGHT VENTRICULAR END-DIASTOLIC DIMENSION: 3.9 CM
SINUS: 3.24 CM
STJ: 2.93 CM
TR MAX PG: 72 MMHG
TRICUSPID ANNULAR PLANE SYSTOLIC EXCURSION: 1.13 CM
TV REST PULMONARY ARTERY PRESSURE: 87 MMHG

## 2023-05-16 PROCEDURE — 93306 TTE W/DOPPLER COMPLETE: CPT | Mod: HCNC

## 2023-05-16 PROCEDURE — 93306 TTE W/DOPPLER COMPLETE: CPT | Mod: 26,HCNC,, | Performed by: INTERNAL MEDICINE

## 2023-05-16 PROCEDURE — 93306 ECHO (CUPID ONLY): ICD-10-PCS | Mod: 26,HCNC,, | Performed by: INTERNAL MEDICINE

## 2023-05-26 ENCOUNTER — TELEPHONE (OUTPATIENT)
Dept: CARDIOLOGY | Facility: CLINIC | Age: 88
End: 2023-05-26
Payer: MEDICARE

## 2023-05-27 ENCOUNTER — HOSPITAL ENCOUNTER (INPATIENT)
Facility: HOSPITAL | Age: 88
LOS: 1 days | Discharge: HOME | DRG: 191 | End: 2023-05-28
Attending: EMERGENCY MEDICINE | Admitting: HOSPITALIST
Payer: MEDICARE

## 2023-05-27 DIAGNOSIS — R07.9 CHEST PAIN: ICD-10-CM

## 2023-05-27 DIAGNOSIS — J44.1 COPD EXACERBATION: Primary | ICD-10-CM

## 2023-05-27 DIAGNOSIS — J96.01 ACUTE HYPOXEMIC RESPIRATORY FAILURE: ICD-10-CM

## 2023-05-27 PROBLEM — R06.00 DYSPNEA: Status: ACTIVE | Noted: 2023-05-27

## 2023-05-27 PROBLEM — I27.20 PULMONARY HYPERTENSION: Status: RESOLVED | Noted: 2022-05-24 | Resolved: 2023-05-27

## 2023-05-27 PROBLEM — R79.89 ELEVATED BRAIN NATRIURETIC PEPTIDE (BNP) LEVEL: Status: ACTIVE | Noted: 2023-05-27

## 2023-05-27 PROBLEM — J98.4 APICAL LUNG SCARRING: Status: RESOLVED | Noted: 2022-12-20 | Resolved: 2023-05-27

## 2023-05-27 LAB
ALBUMIN SERPL BCP-MCNC: 3.9 G/DL (ref 3.5–5.2)
ALP SERPL-CCNC: 54 U/L (ref 55–135)
ALT SERPL W/O P-5'-P-CCNC: 17 U/L (ref 10–44)
ANION GAP SERPL CALC-SCNC: 12 MMOL/L (ref 8–16)
ANION GAP SERPL CALC-SCNC: 12 MMOL/L (ref 8–16)
AST SERPL-CCNC: 24 U/L (ref 10–40)
BASOPHILS # BLD AUTO: 0.02 K/UL (ref 0–0.2)
BASOPHILS # BLD AUTO: 0.02 K/UL (ref 0–0.2)
BASOPHILS NFR BLD: 0.2 % (ref 0–1.9)
BASOPHILS NFR BLD: 0.2 % (ref 0–1.9)
BILIRUB SERPL-MCNC: 1.2 MG/DL (ref 0.1–1)
BNP SERPL-MCNC: 302 PG/ML (ref 0–99)
BUN SERPL-MCNC: 20 MG/DL (ref 8–23)
BUN SERPL-MCNC: 21 MG/DL (ref 8–23)
CALCIUM SERPL-MCNC: 9.3 MG/DL (ref 8.7–10.5)
CALCIUM SERPL-MCNC: 9.6 MG/DL (ref 8.7–10.5)
CHLORIDE SERPL-SCNC: 97 MMOL/L (ref 95–110)
CHLORIDE SERPL-SCNC: 97 MMOL/L (ref 95–110)
CO2 SERPL-SCNC: 27 MMOL/L (ref 23–29)
CO2 SERPL-SCNC: 27 MMOL/L (ref 23–29)
CREAT SERPL-MCNC: 0.7 MG/DL (ref 0.5–1.4)
CREAT SERPL-MCNC: 0.8 MG/DL (ref 0.5–1.4)
D DIMER PPP IA.FEU-MCNC: 0.51 MG/L FEU
DIFFERENTIAL METHOD: ABNORMAL
DIFFERENTIAL METHOD: ABNORMAL
EOSINOPHIL # BLD AUTO: 0 K/UL (ref 0–0.5)
EOSINOPHIL # BLD AUTO: 0 K/UL (ref 0–0.5)
EOSINOPHIL NFR BLD: 0 % (ref 0–8)
EOSINOPHIL NFR BLD: 0.1 % (ref 0–8)
ERYTHROCYTE [DISTWIDTH] IN BLOOD BY AUTOMATED COUNT: 13.6 % (ref 11.5–14.5)
ERYTHROCYTE [DISTWIDTH] IN BLOOD BY AUTOMATED COUNT: 13.6 % (ref 11.5–14.5)
EST. GFR  (NO RACE VARIABLE): >60 ML/MIN/1.73 M^2
EST. GFR  (NO RACE VARIABLE): >60 ML/MIN/1.73 M^2
GLUCOSE SERPL-MCNC: 138 MG/DL (ref 70–110)
GLUCOSE SERPL-MCNC: 144 MG/DL (ref 70–110)
HCT VFR BLD AUTO: 39.1 % (ref 37–48.5)
HCT VFR BLD AUTO: 40.6 % (ref 37–48.5)
HGB BLD-MCNC: 13 G/DL (ref 12–16)
HGB BLD-MCNC: 13.7 G/DL (ref 12–16)
IMM GRANULOCYTES # BLD AUTO: 0.04 K/UL (ref 0–0.04)
IMM GRANULOCYTES # BLD AUTO: 0.05 K/UL (ref 0–0.04)
IMM GRANULOCYTES NFR BLD AUTO: 0.5 % (ref 0–0.5)
IMM GRANULOCYTES NFR BLD AUTO: 0.5 % (ref 0–0.5)
INFLUENZA A, MOLECULAR: NEGATIVE
INFLUENZA B, MOLECULAR: NEGATIVE
LYMPHOCYTES # BLD AUTO: 0.7 K/UL (ref 1–4.8)
LYMPHOCYTES # BLD AUTO: 0.8 K/UL (ref 1–4.8)
LYMPHOCYTES NFR BLD: 7.2 % (ref 18–48)
LYMPHOCYTES NFR BLD: 8.8 % (ref 18–48)
MCH RBC QN AUTO: 29 PG (ref 27–31)
MCH RBC QN AUTO: 29.7 PG (ref 27–31)
MCHC RBC AUTO-ENTMCNC: 33.2 G/DL (ref 32–36)
MCHC RBC AUTO-ENTMCNC: 33.7 G/DL (ref 32–36)
MCV RBC AUTO: 87 FL (ref 82–98)
MCV RBC AUTO: 88 FL (ref 82–98)
MONOCYTES # BLD AUTO: 0.4 K/UL (ref 0.3–1)
MONOCYTES # BLD AUTO: 0.6 K/UL (ref 0.3–1)
MONOCYTES NFR BLD: 5 % (ref 4–15)
MONOCYTES NFR BLD: 6 % (ref 4–15)
NEUTROPHILS # BLD AUTO: 7.4 K/UL (ref 1.8–7.7)
NEUTROPHILS # BLD AUTO: 8.7 K/UL (ref 1.8–7.7)
NEUTROPHILS NFR BLD: 85.4 % (ref 38–73)
NEUTROPHILS NFR BLD: 86.1 % (ref 38–73)
NRBC BLD-RTO: 0 /100 WBC
NRBC BLD-RTO: 0 /100 WBC
PLATELET # BLD AUTO: 130 K/UL (ref 150–450)
PLATELET # BLD AUTO: 131 K/UL (ref 150–450)
PMV BLD AUTO: 10.3 FL (ref 9.2–12.9)
PMV BLD AUTO: 10.7 FL (ref 9.2–12.9)
POTASSIUM SERPL-SCNC: 3.8 MMOL/L (ref 3.5–5.1)
POTASSIUM SERPL-SCNC: 4.2 MMOL/L (ref 3.5–5.1)
PROT SERPL-MCNC: 7 G/DL (ref 6–8.4)
RBC # BLD AUTO: 4.48 M/UL (ref 4–5.4)
RBC # BLD AUTO: 4.62 M/UL (ref 4–5.4)
SARS-COV-2 RDRP RESP QL NAA+PROBE: NEGATIVE
SODIUM SERPL-SCNC: 136 MMOL/L (ref 136–145)
SODIUM SERPL-SCNC: 136 MMOL/L (ref 136–145)
SPECIMEN SOURCE: NORMAL
TROPONIN I SERPL DL<=0.01 NG/ML-MCNC: 0.01 NG/ML (ref 0–0.03)
WBC # BLD AUTO: 10.04 K/UL (ref 3.9–12.7)
WBC # BLD AUTO: 8.61 K/UL (ref 3.9–12.7)

## 2023-05-27 PROCEDURE — 80048 BASIC METABOLIC PNL TOTAL CA: CPT | Mod: XB | Performed by: HOSPITALIST

## 2023-05-27 PROCEDURE — 63600175 PHARM REV CODE 636 W HCPCS: Performed by: EMERGENCY MEDICINE

## 2023-05-27 PROCEDURE — 93010 EKG 12-LEAD: ICD-10-PCS | Mod: ,,, | Performed by: INTERNAL MEDICINE

## 2023-05-27 PROCEDURE — 25000242 PHARM REV CODE 250 ALT 637 W/ HCPCS: Performed by: FAMILY MEDICINE

## 2023-05-27 PROCEDURE — 21400001 HC TELEMETRY ROOM

## 2023-05-27 PROCEDURE — 84484 ASSAY OF TROPONIN QUANT: CPT | Performed by: EMERGENCY MEDICINE

## 2023-05-27 PROCEDURE — U0002 COVID-19 LAB TEST NON-CDC: HCPCS | Performed by: EMERGENCY MEDICINE

## 2023-05-27 PROCEDURE — 93005 ELECTROCARDIOGRAM TRACING: CPT

## 2023-05-27 PROCEDURE — 85379 FIBRIN DEGRADATION QUANT: CPT | Performed by: EMERGENCY MEDICINE

## 2023-05-27 PROCEDURE — 96374 THER/PROPH/DIAG INJ IV PUSH: CPT

## 2023-05-27 PROCEDURE — 25000003 PHARM REV CODE 250: Performed by: FAMILY MEDICINE

## 2023-05-27 PROCEDURE — 85025 COMPLETE CBC W/AUTO DIFF WBC: CPT | Performed by: EMERGENCY MEDICINE

## 2023-05-27 PROCEDURE — 25000003 PHARM REV CODE 250: Performed by: HOSPITALIST

## 2023-05-27 PROCEDURE — 94640 AIRWAY INHALATION TREATMENT: CPT

## 2023-05-27 PROCEDURE — 85025 COMPLETE CBC W/AUTO DIFF WBC: CPT | Mod: 91 | Performed by: HOSPITALIST

## 2023-05-27 PROCEDURE — 83880 ASSAY OF NATRIURETIC PEPTIDE: CPT | Performed by: EMERGENCY MEDICINE

## 2023-05-27 PROCEDURE — 27000221 HC OXYGEN, UP TO 24 HOURS

## 2023-05-27 PROCEDURE — 93010 ELECTROCARDIOGRAM REPORT: CPT | Mod: ,,, | Performed by: INTERNAL MEDICINE

## 2023-05-27 PROCEDURE — 94761 N-INVAS EAR/PLS OXIMETRY MLT: CPT

## 2023-05-27 PROCEDURE — 99223 1ST HOSP IP/OBS HIGH 75: CPT | Mod: ,,, | Performed by: INTERNAL MEDICINE

## 2023-05-27 PROCEDURE — 99900035 HC TECH TIME PER 15 MIN (STAT)

## 2023-05-27 PROCEDURE — 87502 INFLUENZA DNA AMP PROBE: CPT | Performed by: EMERGENCY MEDICINE

## 2023-05-27 PROCEDURE — 63600175 PHARM REV CODE 636 W HCPCS: Performed by: HOSPITALIST

## 2023-05-27 PROCEDURE — 94799 UNLISTED PULMONARY SVC/PX: CPT

## 2023-05-27 PROCEDURE — 80053 COMPREHEN METABOLIC PANEL: CPT | Performed by: EMERGENCY MEDICINE

## 2023-05-27 PROCEDURE — 99291 CRITICAL CARE FIRST HOUR: CPT

## 2023-05-27 PROCEDURE — 99223 PR INITIAL HOSPITAL CARE,LEVL III: ICD-10-PCS | Mod: ,,, | Performed by: INTERNAL MEDICINE

## 2023-05-27 RX ORDER — IPRATROPIUM BROMIDE AND ALBUTEROL SULFATE 2.5; .5 MG/3ML; MG/3ML
3 SOLUTION RESPIRATORY (INHALATION)
Status: DISCONTINUED | OUTPATIENT
Start: 2023-05-27 | End: 2023-05-28 | Stop reason: HOSPADM

## 2023-05-27 RX ORDER — METOPROLOL SUCCINATE 25 MG/1
25 TABLET, EXTENDED RELEASE ORAL DAILY
Status: DISCONTINUED | OUTPATIENT
Start: 2023-05-27 | End: 2023-05-28

## 2023-05-27 RX ORDER — ACETAMINOPHEN 325 MG/1
650 TABLET ORAL EVERY 4 HOURS PRN
Status: DISCONTINUED | OUTPATIENT
Start: 2023-05-27 | End: 2023-05-28 | Stop reason: HOSPADM

## 2023-05-27 RX ORDER — ENOXAPARIN SODIUM 100 MG/ML
40 INJECTION SUBCUTANEOUS EVERY 24 HOURS
Status: DISCONTINUED | OUTPATIENT
Start: 2023-05-27 | End: 2023-05-27 | Stop reason: ALTCHOICE

## 2023-05-27 RX ORDER — PREDNISONE 20 MG/1
40 TABLET ORAL DAILY
Status: DISCONTINUED | OUTPATIENT
Start: 2023-05-27 | End: 2023-05-28 | Stop reason: HOSPADM

## 2023-05-27 RX ORDER — LATANOPROST 50 UG/ML
1 SOLUTION/ DROPS OPHTHALMIC NIGHTLY
Status: DISCONTINUED | OUTPATIENT
Start: 2023-05-27 | End: 2023-05-28 | Stop reason: HOSPADM

## 2023-05-27 RX ORDER — IPRATROPIUM BROMIDE AND ALBUTEROL SULFATE 2.5; .5 MG/3ML; MG/3ML
3 SOLUTION RESPIRATORY (INHALATION) EVERY 4 HOURS PRN
Status: DISCONTINUED | OUTPATIENT
Start: 2023-05-27 | End: 2023-05-27

## 2023-05-27 RX ORDER — SODIUM CHLORIDE 0.9 % (FLUSH) 0.9 %
10 SYRINGE (ML) INJECTION
Status: DISCONTINUED | OUTPATIENT
Start: 2023-05-27 | End: 2023-05-28 | Stop reason: HOSPADM

## 2023-05-27 RX ORDER — AMOXICILLIN AND CLAVULANATE POTASSIUM 875; 125 MG/1; MG/1
1 TABLET, FILM COATED ORAL EVERY 12 HOURS
Status: DISCONTINUED | OUTPATIENT
Start: 2023-05-27 | End: 2023-05-28 | Stop reason: HOSPADM

## 2023-05-27 RX ORDER — FUROSEMIDE 10 MG/ML
20 INJECTION INTRAMUSCULAR; INTRAVENOUS
Status: COMPLETED | OUTPATIENT
Start: 2023-05-27 | End: 2023-05-27

## 2023-05-27 RX ADMIN — IPRATROPIUM BROMIDE AND ALBUTEROL SULFATE 3 ML: .5; 3 SOLUTION RESPIRATORY (INHALATION) at 02:05

## 2023-05-27 RX ADMIN — PREDNISONE 40 MG: 20 TABLET ORAL at 09:05

## 2023-05-27 RX ADMIN — IPRATROPIUM BROMIDE AND ALBUTEROL SULFATE 3 ML: .5; 3 SOLUTION RESPIRATORY (INHALATION) at 07:05

## 2023-05-27 RX ADMIN — AMOXICILLIN AND CLAVULANATE POTASSIUM 1 TABLET: 875; 125 TABLET, FILM COATED ORAL at 09:05

## 2023-05-27 RX ADMIN — FUROSEMIDE 20 MG: 10 INJECTION, SOLUTION INTRAMUSCULAR; INTRAVENOUS at 03:05

## 2023-05-27 RX ADMIN — LATANOPROST 1 DROP: 50 SOLUTION OPHTHALMIC at 09:05

## 2023-05-27 RX ADMIN — IPRATROPIUM BROMIDE AND ALBUTEROL SULFATE 3 ML: .5; 3 SOLUTION RESPIRATORY (INHALATION) at 08:05

## 2023-05-27 RX ADMIN — ACETAMINOPHEN 650 MG: 325 TABLET ORAL at 09:05

## 2023-05-27 RX ADMIN — APIXABAN 2.5 MG: 2.5 TABLET, FILM COATED ORAL at 09:05

## 2023-05-27 NOTE — ASSESSMENT & PLAN NOTE
Patient presented with acute onset dyspnea 7 elevated BNP measuring 302. Currently follows Dr. Rhodes from cardiology with last reported echo noted below. CXR negative for edema/effusions with physical exam negative for elevated JVD, crackles, or lower extremity edema. Patient s/p 20 mg IV lasix x1 in the ED.     Echo 5/16/23   The left ventricle is normal in size with concentric remodeling and normal systolic function.   Severe left atrial enlargement.   Indeterminate left ventricular diastolic function.   The estimated PA systolic pressure is 87 mmHg.   Moderate right ventricular enlargement with moderately reduced right ventricular systolic function.   There is pulmonary hypertension.   Elevated central venous pressure (15 mmHg).   The estimated ejection fraction is 60%.   There is abnormal septal wall motion. There is systolic flattening of the interventricular septum consistent with right ventricle pressure overload.   Severe right atrial enlargement.   Mild aortic regurgitation.   Moderate mitral regurgitation.   Moderate to severe tricuspid regurgitation.   Mild pulmonic regurgitation.    Plan:  -monitor I/O  -continue home medications  -f/u outpatient with cardiology

## 2023-05-27 NOTE — HPI
presented to the ED complaining of acute onset substernal chest pain and dyspnea prior to arrival.      89 yo female, cardiology consult for afib and chest pain  PMH permanent a fib, PHTN, TR, MR, aortic valve sclerosis. Copd/emphysema. F/u cardiologist Dr. Juaquin Rhodes  C/o chest pain and SOB for a day.   Echo in  EF 60% mod RV dysfunction, pulm HTN mod to severe TR, mod MR  Today ekg AFIB VR C  Cxr no acute issue, , Troponin negative, Cr 0.8 and HGb 13  Rx for copd exacerbation  Now AFIB VR C and chest pain free

## 2023-05-27 NOTE — PHARMACY MED REC
"Admission Medication History     The home medication history was taken by Alexx Leung.    You may go to "Admission" then "Reconcile Home Medications" tabs to review and/or act upon these items.     The home medication list has been updated by the Pharmacy department.   Please read ALL comments highlighted in yellow.   Please address this information as you see fit.    Feel free to contact us if you have any questions or require assistance.      Medications listed below were obtained from: [employer] and Medical records  (Not in a hospital admission)      Alexx Leung  [id number]    Current Outpatient Medications on File Prior to Encounter   Medication Sig Dispense Refill Last Dose    alendronate (FOSAMAX) 70 MG tablet TAKE 1 TAB WEEKLY IN THE MORNING WITH FULL GLASS OF WATER ON EMPTY STOMACH.DO NOT LIE DOWN FOR AT LEAST 30 MINUTES AFTER 12 tablet 4     apixaban (ELIQUIS) 2.5 mg Tab Take 1 tablet (2.5 mg total) by mouth 2 (two) times daily. 180 tablet 3     latanoprost 0.005 % ophthalmic solution INSTILL 1 DROP INTO BOTH EYES EVERY NIGHT 3 mL 12     metoprolol succinate (TOPROL-XL) 25 MG 24 hr tablet Take 1 tablet (25 mg total) by mouth once daily. 90 tablet 4     multivitamin (THERAGRAN) per tablet Take 1 tablet by mouth once daily.       timolol maleate 0.5% (TIMOPTIC) 0.5 % Drop PLACE 1 DROP INTO THE RIGHT EYE EVERY MORNING. 10 mL 4     turmeric root extract 500 mg Cap Take 500 mg by mouth once daily.                            .        "

## 2023-05-27 NOTE — ED PROVIDER NOTES
SCRIBE #1 NOTE: I, Trent Armenta, am scribing for, and in the presence of, Nilton Jane MD. I have scribed the entire note.       History     Chief Complaint   Patient presents with    Chest Pain     Chest pain, SOB, nausea onset 8 hrs PTA. Hx MI,HTN     Review of patient's allergies indicates:   Allergen Reactions    Shellfish containing products Diarrhea and Nausea And Vomiting    Venom-wasp Itching and Swelling         History of Present Illness     HPI    5/27/2023, 1:49 AM  History obtained from the patient      History of Present Illness: Destini Augustine is a 89 y.o. female patient with a PMHx of a-fib who presents to the Emergency Department for evaluation of CP which onset gradually 9 hours ago. EMS states the pt also started having SOB 2 hours ago with n/v. They note they gave the pt morphine, ASA, and Zofran en route. Symptoms are constant and moderate in severity. No mitigating or exacerbating factors reported. Patient denies any fever, chills, leg swelling, abdominal pain, and all other sxs at this time. No further complaints or concerns at this time.       Arrival mode: EMS    PCP: Glenn Love MD        Past Medical History:  Past Medical History:   Diagnosis Date    Arthritis     knees, hands    Atrial fibrillation     Basal cell carcinoma     face, right thigh    COAG (chronic open-angle glaucoma) - Both Eyes 8/28/2013    Ex-smoker     Hamartoma     mandible    Macular degeneration     Osteopenia     7/14 shama 7/16    Pulmonary heart disease     Scapholunate ligament injury with no instability     Scoliosis 11/30/15    lumbar x-ray       Past Surgical History:  Past Surgical History:   Procedure Laterality Date    APPENDECTOMY  1970s    CATARACT EXTRACTION Bilateral     DR. Singleton    hysterectomy  1970s    complete    HYSTERECTOMY      about 39yrs old, partial    MOUTH SURGERY Bilateral 02/10/2020    rectocele and cystocele  1998 approx    TONSILLECTOMY, ADENOIDECTOMY  7 y/o         Family  History:  Family History   Problem Relation Age of Onset    Cataracts Mother     Hypertension Mother     Stroke Mother     Cancer Father         colon    Thyroid disease Father     Cancer Brother         lung    Tuberculosis Brother     Blindness Neg Hx     Diabetes Neg Hx     Macular degeneration Neg Hx     Retinal detachment Neg Hx     Strabismus Neg Hx        Social History:  Social History     Tobacco Use    Smoking status: Former     Packs/day: 1.50     Years: 30.00     Pack years: 45.00     Types: Cigarettes     Quit date: 1980     Years since quittin.3    Smokeless tobacco: Never   Substance and Sexual Activity    Alcohol use: Not Currently    Drug use: No    Sexual activity: Never     Partners: Female        Review of Systems     Review of Systems   Constitutional:  Negative for fever.   HENT:  Negative for sore throat.    Respiratory:  Positive for shortness of breath.    Cardiovascular:  Positive for chest pain.   Gastrointestinal:  Positive for nausea and vomiting. Negative for abdominal pain.   Genitourinary:  Negative for dysuria.   Musculoskeletal:  Negative for back pain.   Skin:  Negative for rash.   Neurological:  Negative for weakness.   Hematological:  Does not bruise/bleed easily.   All other systems reviewed and are negative.     Physical Exam     Initial Vitals [23 0107]   BP Pulse Resp Temp SpO2   118/76 85 16 98 °F (36.7 °C) 96 %      MAP       --          Physical Exam   Nursing Notes and Vital Signs Reviewed.  Constitutional: Patient is in no acute distress. Well-developed and well-nourished.  Head: Atraumatic. Normocephalic.  Eyes: PERRL. EOM intact. Conjunctivae are not pale. No scleral icterus.  ENT: Mucous membranes are moist. Oropharynx is clear and symmetric.    Neck: Supple. Full ROM. No lymphadenopathy.  Cardiovascular: Regular rate. Irregularly irregular rhythm. No murmurs, rubs, or gallops. Distal pulses are 2+ and symmetric.  Pulmonary/Chest: No respiratory  distress. Clear to auscultation bilaterally. No wheezing or rales.  Abdominal: Soft and non-distended.  There is no tenderness.  No rebound, guarding, or rigidity. Good bowel sounds.  Genitourinary: No CVA tenderness  Musculoskeletal: Moves all extremities. No obvious deformities. No edema. No calf tenderness.  Skin: Warm and dry.  Neurological:  Alert, awake, and appropriate.  Normal speech.  No acute focal neurological deficits are appreciated.  Psychiatric: Normal affect. Good eye contact. Appropriate in content.     ED Course   Critical Care    Date/Time: 5/27/2023 3:40 AM  Performed by: Nilton Jane MD  Authorized by: Nilton Jane MD   Direct patient critical care time: 35 minutes  Additional history critical care time: 10 minutes  Ordering / reviewing critical care time: 10 minutes  Documentation critical care time: 10 minutes  Total critical care time (exclusive of procedural time) : 65 minutes  Critical care time was exclusive of separately billable procedures and treating other patients and teaching time.  Critical care was necessary to treat or prevent imminent or life-threatening deterioration of the following conditions: Acute hypoxemic respiratory failure.  Critical care was time spent personally by me on the following activities: blood draw for specimens, development of treatment plan with patient or surrogate, interpretation of cardiac output measurements, evaluation of patient's response to treatment, examination of patient, obtaining history from patient or surrogate, ordering and performing treatments and interventions, ordering and review of laboratory studies, ordering and review of radiographic studies, pulse oximetry, re-evaluation of patient's condition and review of old charts.      ED Vital Signs:  Vitals:    05/27/23 0107 05/27/23 0233 05/27/23 0302 05/27/23 0317   BP: 118/76 129/77 126/64 137/66   Pulse: 85 90 92 88   Resp: 16 (!) 21 (!) 26 18   Temp: 98 °F (36.7 °C)      SpO2: 96% (!)  90% (!) 90% 100%   Weight:        05/27/23 0333 05/27/23 0533 05/27/23 0602 05/27/23 0615   BP: 125/64 (!) 106/51 (!) 108/55    Pulse: 93 84 85    Resp:  20 (!) 24    Temp:       SpO2: 100% 100% 99%    Weight:    60 kg (132 lb 4.8 oz)    05/27/23 0617   BP: (!) 105/59   Pulse: 87   Resp:    Temp:    SpO2: 99%   Weight:        Abnormal Lab Results:  Labs Reviewed   CBC W/ AUTO DIFFERENTIAL - Abnormal; Notable for the following components:       Result Value    Platelets 131 (*)     Lymph # 0.8 (*)     Gran % 85.4 (*)     Lymph % 8.8 (*)     All other components within normal limits   B-TYPE NATRIURETIC PEPTIDE - Abnormal; Notable for the following components:     (*)     All other components within normal limits   COMPREHENSIVE METABOLIC PANEL - Abnormal; Notable for the following components:    Glucose 144 (*)     Total Bilirubin 1.2 (*)     Alkaline Phosphatase 54 (*)     All other components within normal limits   D DIMER, QUANTITATIVE - Abnormal; Notable for the following components:    D-Dimer 0.51 (*)     All other components within normal limits   CBC W/ AUTO DIFFERENTIAL - Abnormal; Notable for the following components:    Platelets 130 (*)     Gran # (ANC) 8.7 (*)     Immature Grans (Abs) 0.05 (*)     Lymph # 0.7 (*)     Gran % 86.1 (*)     Lymph % 7.2 (*)     All other components within normal limits   BASIC METABOLIC PANEL - Abnormal; Notable for the following components:    Glucose 138 (*)     All other components within normal limits   INFLUENZA A & B BY MOLECULAR   TROPONIN I   SARS-COV-2 RNA AMPLIFICATION, QUAL        All Lab Results:  Results for orders placed or performed during the hospital encounter of 05/27/23   Influenza A & B by Molecular    Specimen: Nasopharyngeal Swab   Result Value Ref Range    Influenza A, Molecular Negative Negative    Influenza B, Molecular Negative Negative    Flu A & B Source Nasal swab    CBC auto differential   Result Value Ref Range    WBC 8.61 3.90 - 12.70  K/uL    RBC 4.62 4.00 - 5.40 M/uL    Hemoglobin 13.7 12.0 - 16.0 g/dL    Hematocrit 40.6 37.0 - 48.5 %    MCV 88 82 - 98 fL    MCH 29.7 27.0 - 31.0 pg    MCHC 33.7 32.0 - 36.0 g/dL    RDW 13.6 11.5 - 14.5 %    Platelets 131 (L) 150 - 450 K/uL    MPV 10.7 9.2 - 12.9 fL    Immature Granulocytes 0.5 0.0 - 0.5 %    Gran # (ANC) 7.4 1.8 - 7.7 K/uL    Immature Grans (Abs) 0.04 0.00 - 0.04 K/uL    Lymph # 0.8 (L) 1.0 - 4.8 K/uL    Mono # 0.4 0.3 - 1.0 K/uL    Eos # 0.0 0.0 - 0.5 K/uL    Baso # 0.02 0.00 - 0.20 K/uL    nRBC 0 0 /100 WBC    Gran % 85.4 (H) 38.0 - 73.0 %    Lymph % 8.8 (L) 18.0 - 48.0 %    Mono % 5.0 4.0 - 15.0 %    Eosinophil % 0.1 0.0 - 8.0 %    Basophil % 0.2 0.0 - 1.9 %    Differential Method Automated    Brain natriuretic peptide   Result Value Ref Range     (H) 0 - 99 pg/mL   Comprehensive metabolic panel   Result Value Ref Range    Sodium 136 136 - 145 mmol/L    Potassium 4.2 3.5 - 5.1 mmol/L    Chloride 97 95 - 110 mmol/L    CO2 27 23 - 29 mmol/L    Glucose 144 (H) 70 - 110 mg/dL    BUN 21 8 - 23 mg/dL    Creatinine 0.7 0.5 - 1.4 mg/dL    Calcium 9.6 8.7 - 10.5 mg/dL    Total Protein 7.0 6.0 - 8.4 g/dL    Albumin 3.9 3.5 - 5.2 g/dL    Total Bilirubin 1.2 (H) 0.1 - 1.0 mg/dL    Alkaline Phosphatase 54 (L) 55 - 135 U/L    AST 24 10 - 40 U/L    ALT 17 10 - 44 U/L    Anion Gap 12 8 - 16 mmol/L    eGFR >60 >60 mL/min/1.73 m^2   Troponin I   Result Value Ref Range    Troponin I 0.006 0.000 - 0.026 ng/mL   D dimer, quantitative   Result Value Ref Range    D-Dimer 0.51 (H) <0.50 mg/L FEU   COVID-19 Rapid Screening   Result Value Ref Range    SARS-CoV-2 RNA, Amplification, Qual Negative Negative   CBC auto differential   Result Value Ref Range    WBC 10.04 3.90 - 12.70 K/uL    RBC 4.48 4.00 - 5.40 M/uL    Hemoglobin 13.0 12.0 - 16.0 g/dL    Hematocrit 39.1 37.0 - 48.5 %    MCV 87 82 - 98 fL    MCH 29.0 27.0 - 31.0 pg    MCHC 33.2 32.0 - 36.0 g/dL    RDW 13.6 11.5 - 14.5 %    Platelets 130 (L) 150 - 450  K/uL    MPV 10.3 9.2 - 12.9 fL    Immature Granulocytes 0.5 0.0 - 0.5 %    Gran # (ANC) 8.7 (H) 1.8 - 7.7 K/uL    Immature Grans (Abs) 0.05 (H) 0.00 - 0.04 K/uL    Lymph # 0.7 (L) 1.0 - 4.8 K/uL    Mono # 0.6 0.3 - 1.0 K/uL    Eos # 0.0 0.0 - 0.5 K/uL    Baso # 0.02 0.00 - 0.20 K/uL    nRBC 0 0 /100 WBC    Gran % 86.1 (H) 38.0 - 73.0 %    Lymph % 7.2 (L) 18.0 - 48.0 %    Mono % 6.0 4.0 - 15.0 %    Eosinophil % 0.0 0.0 - 8.0 %    Basophil % 0.2 0.0 - 1.9 %    Differential Method Automated    Basic metabolic panel   Result Value Ref Range    Sodium 136 136 - 145 mmol/L    Potassium 3.8 3.5 - 5.1 mmol/L    Chloride 97 95 - 110 mmol/L    CO2 27 23 - 29 mmol/L    Glucose 138 (H) 70 - 110 mg/dL    BUN 20 8 - 23 mg/dL    Creatinine 0.8 0.5 - 1.4 mg/dL    Calcium 9.3 8.7 - 10.5 mg/dL    Anion Gap 12 8 - 16 mmol/L    eGFR >60 >60 mL/min/1.73 m^2         Imaging Results:  Imaging Results              X-Ray Chest AP Portable (In process)                      The EKG was ordered, reviewed, and independently interpreted by the ED provider.  Interpretation time: 02:18  Rate: 95 BPM  Rhythm: atrial fibrillation  Interpretation: Left axis deviation. Minimal voltage criteria for LVH, may be normal variant ([place] product). No STEMI.    The Emergency Provider reviewed the vital signs and test results, which are outlined above.     ED Discussion       3:38 AM: Discussed case with Jer Michael MD ([facility]). Dr. Michael agrees with current care and management of pt and accepts admission.   Admitting Service: Hospital Medicine  Admitting Physician: Dr. Michael  Admit to: Obs med tele    3:38 AM: Re-evaluated pt. I have discussed test results, shared treatment plan, and the need for admission with patient and family at bedside. Pt and family express understanding at this time and agree with all information. All questions answered. Pt and family have no further questions or concerns at this time. Pt is ready for admit.          Medical Decision Making:   Clinical Tests:   Lab Tests: Ordered and Reviewed  Radiological Study: Ordered and Reviewed  Medical Tests: Ordered and Reviewed       DDx includes: HF, Pulmonary HTN, PNA, PE, Volume overload, ACS    ED Medication(s):  Medications   sodium chloride 0.9% flush 10 mL (has no administration in time range)   acetaminophen tablet 650 mg (has no administration in time range)   predniSONE tablet 40 mg (has no administration in time range)   albuterol-ipratropium 2.5 mg-0.5 mg/3 mL nebulizer solution 3 mL (has no administration in time range)   apixaban tablet 2.5 mg (has no administration in time range)   metoprolol succinate (TOPROL-XL) 24 hr tablet 25 mg (has no administration in time range)   furosemide injection 20 mg (20 mg Intravenous Given 5/27/23 0326)       New Prescriptions    No medications on file               Scribe Attestation:   Scribe #1: I performed the above scribed service and the documentation accurately describes the services I performed. I attest to the accuracy of the note.     Attending:   Physician Attestation Statement for Scribe #1: I, Nilton Jane MD, personally performed the services described in this documentation, as scribed by Trent Armenta, in my presence, and it is both accurate and complete.           Clinical Impression       ICD-10-CM ICD-9-CM   1. Chest pain  R07.9 786.50   2. Acute hypoxemic respiratory failure  J96.01 518.81       Disposition:   Disposition: Placed in Observation  Condition: Jessica Jane MD  05/27/23 0728

## 2023-05-27 NOTE — SUBJECTIVE & OBJECTIVE
Past Medical History:   Diagnosis Date    Arthritis     knees, hands    Atrial fibrillation     Basal cell carcinoma     face, right thigh    COAG (chronic open-angle glaucoma) - Both Eyes 8/28/2013    COPD exacerbation 5/27/2023    Ex-smoker     Hamartoma     mandible    Macular degeneration     Osteopenia     7/14 shama 7/16    Pulmonary heart disease     Scapholunate ligament injury with no instability     Scoliosis 11/30/15    lumbar x-ray       Past Surgical History:   Procedure Laterality Date    APPENDECTOMY  1970s    CATARACT EXTRACTION Bilateral     DR. Singleton    hysterectomy  1970s    complete    HYSTERECTOMY      about 39yrs old, partial    MOUTH SURGERY Bilateral 02/10/2020    rectocele and cystocele  1998 approx    TONSILLECTOMY, ADENOIDECTOMY  5 y/o       Review of patient's allergies indicates:   Allergen Reactions    Shellfish containing products Diarrhea and Nausea And Vomiting    Venom-wasp Itching and Swelling       No current facility-administered medications on file prior to encounter.     Current Outpatient Medications on File Prior to Encounter   Medication Sig    alendronate (FOSAMAX) 70 MG tablet TAKE 1 TAB WEEKLY IN THE MORNING WITH FULL GLASS OF WATER ON EMPTY STOMACH.DO NOT LIE DOWN FOR AT LEAST 30 MINUTES AFTER    apixaban (ELIQUIS) 2.5 mg Tab Take 1 tablet (2.5 mg total) by mouth 2 (two) times daily.    latanoprost 0.005 % ophthalmic solution INSTILL 1 DROP INTO BOTH EYES EVERY NIGHT    metoprolol succinate (TOPROL-XL) 25 MG 24 hr tablet Take 1 tablet (25 mg total) by mouth once daily.    multivitamin (THERAGRAN) per tablet Take 1 tablet by mouth once daily.    timolol maleate 0.5% (TIMOPTIC) 0.5 % Drop PLACE 1 DROP INTO THE RIGHT EYE EVERY MORNING.    turmeric root extract 500 mg Cap Take 500 mg by mouth once daily.    [DISCONTINUED] BIOTIN ORAL Take 1 tablet by mouth 2 (two) times daily. Once daily per patient    [DISCONTINUED] CALCIUM CARBONATE/VITAMIN D3 (CALCIUM 600 + D,3, ORAL)  Take 1 capsule by mouth 2 (two) times daily.    [DISCONTINUED] DOCOSAHEXANOIC ACID/EPA (FISH OIL ORAL) Take 1 capsule by mouth 2 (two) times daily.     [DISCONTINUED] GLUCOSAMINE HCL/CHONDRO COBB A (GLUCOSAMINE-CHONDROITIN ORAL) Take 1 tablet by mouth 2 (two) times daily.      Family History       Problem Relation (Age of Onset)    Cancer Father, Brother    Cataracts Mother    Hypertension Mother    Stroke Mother    Thyroid disease Father    Tuberculosis Brother          Tobacco Use    Smoking status: Former     Packs/day: 1.50     Years: 30.00     Pack years: 45.00     Types: Cigarettes     Quit date: 1980     Years since quittin.3    Smokeless tobacco: Never   Substance and Sexual Activity    Alcohol use: Not Currently    Drug use: No    Sexual activity: Never     Partners: Female     Review of Systems   Constitutional: Negative for decreased appetite, diaphoresis, fever, malaise/fatigue and night sweats.   HENT:  Negative for nosebleeds.    Eyes:  Negative for blurred vision and double vision.   Cardiovascular:  Positive for dyspnea on exertion. Negative for chest pain, claudication, irregular heartbeat, leg swelling, near-syncope, orthopnea, palpitations, paroxysmal nocturnal dyspnea and syncope.   Respiratory:  Negative for cough, shortness of breath, sleep disturbances due to breathing, snoring, sputum production and wheezing.    Endocrine: Negative for cold intolerance and polyuria.   Hematologic/Lymphatic: Does not bruise/bleed easily.   Skin:  Negative for rash.   Musculoskeletal:  Negative for back pain, falls, joint pain, joint swelling and neck pain.   Gastrointestinal:  Negative for abdominal pain, heartburn, nausea and vomiting.   Genitourinary:  Negative for dysuria, frequency and hematuria.   Neurological:  Negative for difficulty with concentration, dizziness, focal weakness, headaches, light-headedness, numbness, seizures and weakness.   Psychiatric/Behavioral:  Negative for depression,  memory loss and substance abuse. The patient does not have insomnia.    Allergic/Immunologic: Negative for HIV exposure and hives.   Objective:     Vital Signs (Most Recent):  Temp: 98.2 °F (36.8 °C) (05/27/23 1412)  Pulse: 84 (05/27/23 1413)  Resp: 18 (05/27/23 1413)  BP: 102/60 (05/27/23 1408)  SpO2: 96 % (05/27/23 1413) Vital Signs (24h Range):  Temp:  [98 °F (36.7 °C)-98.2 °F (36.8 °C)] 98.2 °F (36.8 °C)  Pulse:  [81-96] 84  Resp:  [16-26] 18  SpO2:  [89 %-100 %] 96 %  BP: ()/(49-77) 102/60     Weight: 60 kg (132 lb 4.8 oz)  Body mass index is 20.72 kg/m².    SpO2: 96 %       No intake or output data in the 24 hours ending 05/27/23 1710    Lines/Drains/Airways       Peripheral Intravenous Line  Duration                  Peripheral IV - Single Lumen 05/27/23 20 G Right Forearm <1 day                     Physical Exam  HENT:      Head: Normocephalic.   Eyes:      Pupils: Pupils are equal, round, and reactive to light.   Neck:      Thyroid: No thyromegaly.      Vascular: Normal carotid pulses. No carotid bruit or JVD.   Cardiovascular:      Rate and Rhythm: Normal rate. Rhythm irregularly irregular. No extrasystoles are present.     Chest Wall: PMI is not displaced.      Pulses: Normal pulses.      Heart sounds: Normal heart sounds. No murmur heard.    No gallop. No S3 sounds.   Pulmonary:      Effort: No respiratory distress.      Breath sounds: No stridor. Decreased breath sounds present.   Abdominal:      General: Bowel sounds are normal.      Palpations: Abdomen is soft.      Tenderness: There is no abdominal tenderness. There is no rebound.   Musculoskeletal:         General: Normal range of motion.   Skin:     Findings: No rash.   Neurological:      Mental Status: She is alert and oriented to person, place, and time.   Psychiatric:         Behavior: Behavior normal.        Significant Labs: ABG: No results for input(s): PH, PCO2, HCO3, POCSATURATED, BE in the last 48 hours., Blood Culture: No results  for input(s): LABBLOO in the last 48 hours., BMP:   Recent Labs   Lab 05/27/23 0210 05/27/23 0622   * 138*    136   K 4.2 3.8   CL 97 97   CO2 27 27   BUN 21 20   CREATININE 0.7 0.8   CALCIUM 9.6 9.3   , CMP   Recent Labs   Lab 05/27/23 0210 05/27/23 0622    136   K 4.2 3.8   CL 97 97   CO2 27 27   * 138*   BUN 21 20   CREATININE 0.7 0.8   CALCIUM 9.6 9.3   PROT 7.0  --    ALBUMIN 3.9  --    BILITOT 1.2*  --    ALKPHOS 54*  --    AST 24  --    ALT 17  --    ANIONGAP 12 12   , CBC   Recent Labs   Lab 05/27/23 0210 05/27/23 0622   WBC 8.61 10.04   HGB 13.7 13.0   HCT 40.6 39.1   * 130*   , INR No results for input(s): INR, PROTIME in the last 48 hours., Lipid Panel No results for input(s): CHOL, HDL, LDLCALC, TRIG, CHOLHDL in the last 48 hours., and Troponin   Recent Labs   Lab 05/27/23 0210   TROPONINI 0.006       Significant Imaging: EKG:

## 2023-05-27 NOTE — CARE UPDATE
Admitted for copdx  Endorses symptom improvement in dyspnea  Chest pain improved   Reports inciting event with watching game  Denies taking steroids chronically  Denies wearing supplemental oxygen at baseline  Son at bedside  Independent with ADLs    Pmh afib on beta blocker and eliquis    No acute distress  Underweight  No respiratory distress, wearing nasal cannula  Decreased air movement on auscultation of lungs  Kyphosis  Non tender abdomen on palpation  Abdominal scar, healing well, no signs or symptoms of infection  No lower extremity edema  2-3 sec cap refill    Continue copdx treatment with systemic steroids, breathing treatments and po augmentin  D-dimer within normal limits, age-adjusted  Chest x-ray with no acute abnormality    Hypotension on monitor in room  Consider stress dose steroids? Vs midodrine vs conservative treatment     Mildly elevated bnp  Troponin(s) within normal limits   Echocardiogram reviewed, preserved ejection fraction 60%, abnormal septal wall motion  No signs or symptoms of volume overload  Received one dose of lasix intravenous   Hold beta blocker   Continue eliquis for afib  Has appointment with primary cardiology on Monday   Consider cardiology consult inpatient vs outpatient follow up     Updated out of town son over phone

## 2023-05-27 NOTE — ASSESSMENT & PLAN NOTE
SOB CP   Troponin negative x1 and ekg showed no acute STT change    Pain resolved after emphysema Rx  Repeat troponin x1

## 2023-05-27 NOTE — ASSESSMENT & PLAN NOTE
Patient with Permanent atrial fibrillation which is uncontrolled currently with Beta Blocker. Patient is currently in atrial fibrillation.[id number] Score: 3. Anticoagulation indicated. Anticoagulation done with Eliquis.

## 2023-05-27 NOTE — SUBJECTIVE & OBJECTIVE
Past Medical History:   Diagnosis Date    Arthritis     knees, hands    Atrial fibrillation     Basal cell carcinoma     face, right thigh    COAG (chronic open-angle glaucoma) - Both Eyes 8/28/2013    Ex-smoker     Hamartoma     mandible    Macular degeneration     Osteopenia     7/14 shama 7/16    Pulmonary heart disease     Scapholunate ligament injury with no instability     Scoliosis 11/30/15    lumbar x-ray       Past Surgical History:   Procedure Laterality Date    APPENDECTOMY  1970s    CATARACT EXTRACTION Bilateral     DR. Singleton    hysterectomy  1970s    complete    HYSTERECTOMY      about 39yrs old, partial    MOUTH SURGERY Bilateral 02/10/2020    rectocele and cystocele  1998 approx    TONSILLECTOMY, ADENOIDECTOMY  7 y/o       Review of patient's allergies indicates:   Allergen Reactions    Shellfish containing products Diarrhea and Nausea And Vomiting    Venom-wasp Itching and Swelling       No current facility-administered medications on file prior to encounter.     Current Outpatient Medications on File Prior to Encounter   Medication Sig    alendronate (FOSAMAX) 70 MG tablet TAKE 1 TAB WEEKLY IN THE MORNING WITH FULL GLASS OF WATER ON EMPTY STOMACH.DO NOT LIE DOWN FOR AT LEAST 30 MINUTES AFTER    apixaban (ELIQUIS) 2.5 mg Tab Take 1 tablet (2.5 mg total) by mouth 2 (two) times daily.    BIOTIN ORAL Take 1 tablet by mouth 2 (two) times daily. Once daily per patient    CALCIUM CARBONATE/VITAMIN D3 (CALCIUM 600 + D,3, ORAL) Take 1 capsule by mouth 2 (two) times daily.    DOCOSAHEXANOIC ACID/EPA (FISH OIL ORAL) Take 1 capsule by mouth 2 (two) times daily.     GLUCOSAMINE HCL/CHONDRO COBB A (GLUCOSAMINE-CHONDROITIN ORAL) Take 1 tablet by mouth 2 (two) times daily.     latanoprost 0.005 % ophthalmic solution INSTILL 1 DROP INTO BOTH EYES EVERY NIGHT    metoprolol succinate (TOPROL-XL) 25 MG 24 hr tablet Take 1 tablet (25 mg total) by mouth once daily.    multivitamin (THERAGRAN) per tablet Take 1 tablet by  mouth once daily.    timolol maleate 0.5% (TIMOPTIC) 0.5 % Drop PLACE 1 DROP INTO THE RIGHT EYE EVERY MORNING.    turmeric root extract 500 mg Cap Take by mouth. Once daily per patient     Family History       Problem Relation (Age of Onset)    Cancer Father, Brother    Cataracts Mother    Hypertension Mother    Stroke Mother    Thyroid disease Father    Tuberculosis Brother          Tobacco Use    Smoking status: Former     Packs/day: 1.50     Years: 30.00     Pack years: 45.00     Types: Cigarettes     Quit date: 1980     Years since quittin.3    Smokeless tobacco: Never   Substance and Sexual Activity    Alcohol use: Not Currently    Drug use: No    Sexual activity: Never     Partners: Female     Review of Systems   All other systems reviewed and are negative.  Objective:     Vital Signs (Most Recent):  Temp: 98 °F (36.7 °C) (23 0107)  Pulse: 87 (23 06)  Resp: (!) 24 (23 0602)  BP: (!) 105/59 (23)  SpO2: 99 % (23) Vital Signs (24h Range):  Temp:  [98 °F (36.7 °C)] 98 °F (36.7 °C)  Pulse:  [84-93] 87  Resp:  [16-26] 24  SpO2:  [90 %-100 %] 99 %  BP: (105-137)/(51-77) 105/59     Weight: 60 kg (132 lb 4.8 oz)  Body mass index is 20.72 kg/m².     Physical Exam  Vitals reviewed.   Constitutional:       General: She is not in acute distress.     Appearance: Normal appearance. She is normal weight. She is not ill-appearing, toxic-appearing or diaphoretic.      Comments: Frail appearing. Not septic and in no acute distress.   HENT:      Head: Normocephalic and atraumatic.      Right Ear: External ear normal.      Left Ear: External ear normal.      Nose: Nose normal. No congestion or rhinorrhea.      Mouth/Throat:      Mouth: Mucous membranes are moist.      Pharynx: Oropharynx is clear. No oropharyngeal exudate or posterior oropharyngeal erythema.      Comments: Poor dentition   Eyes:      General: No scleral icterus.     Extraocular Movements: Extraocular movements  intact.      Conjunctiva/sclera: Conjunctivae normal.      Pupils: Pupils are equal, round, and reactive to light.   Neck:      Vascular: No carotid bruit.   Cardiovascular:      Rate and Rhythm: Normal rate and regular rhythm.      Pulses: Normal pulses.      Heart sounds: Normal heart sounds. No murmur heard.    No friction rub. No gallop.   Pulmonary:      Effort: No respiratory distress.      Breath sounds: Normal breath sounds. No stridor. No wheezing, rhonchi or rales.      Comments: Patient tachypneic speaking full sentences without use of accessory muscles noted is evidence of wheezes, rales, or rhonchi noted.  Chest:      Chest wall: No tenderness.   Abdominal:      General: Abdomen is flat. Bowel sounds are normal. There is no distension.      Palpations: Abdomen is soft.      Tenderness: There is no abdominal tenderness. There is no right CVA tenderness, left CVA tenderness, guarding or rebound.      Hernia: No hernia is present.   Musculoskeletal:         General: No swelling, tenderness, deformity or signs of injury. Normal range of motion.      Cervical back: Normal range of motion and neck supple. No rigidity or tenderness.      Right lower leg: No edema.      Left lower leg: No edema.   Lymphadenopathy:      Cervical: No cervical adenopathy.   Skin:     General: Skin is warm and dry.      Capillary Refill: Capillary refill takes less than 2 seconds.      Coloration: Skin is not jaundiced or pale.      Findings: No bruising, erythema, lesion or rash.   Neurological:      General: No focal deficit present.      Mental Status: She is alert and oriented to person, place, and time. Mental status is at baseline.      Cranial Nerves: No cranial nerve deficit.      Sensory: No sensory deficit.      Motor: No weakness.      Coordination: Coordination normal.   Psychiatric:         Mood and Affect: Mood normal.         Behavior: Behavior normal.         Thought Content: Thought content normal.          Judgment: Judgment normal.            CRANIAL NERVES     CN III, IV, VI   Pupils are equal, round, and reactive to light.     Significant Labs: All pertinent labs within the past 24 hours have been reviewed.    Significant Imaging: I have reviewed all pertinent imaging results/findings within the past 24 hours.    LABS:  Recent Results (from the past 24 hour(s))   CBC auto differential    Collection Time: 05/27/23  2:10 AM   Result Value Ref Range    WBC 8.61 3.90 - 12.70 K/uL    RBC 4.62 4.00 - 5.40 M/uL    Hemoglobin 13.7 12.0 - 16.0 g/dL    Hematocrit 40.6 37.0 - 48.5 %    MCV 88 82 - 98 fL    MCH 29.7 27.0 - 31.0 pg    MCHC 33.7 32.0 - 36.0 g/dL    RDW 13.6 11.5 - 14.5 %    Platelets 131 (L) 150 - 450 K/uL    MPV 10.7 9.2 - 12.9 fL    Immature Granulocytes 0.5 0.0 - 0.5 %    Gran # (ANC) 7.4 1.8 - 7.7 K/uL    Immature Grans (Abs) 0.04 0.00 - 0.04 K/uL    Lymph # 0.8 (L) 1.0 - 4.8 K/uL    Mono # 0.4 0.3 - 1.0 K/uL    Eos # 0.0 0.0 - 0.5 K/uL    Baso # 0.02 0.00 - 0.20 K/uL    nRBC 0 0 /100 WBC    Gran % 85.4 (H) 38.0 - 73.0 %    Lymph % 8.8 (L) 18.0 - 48.0 %    Mono % 5.0 4.0 - 15.0 %    Eosinophil % 0.1 0.0 - 8.0 %    Basophil % 0.2 0.0 - 1.9 %    Differential Method Automated    Brain natriuretic peptide    Collection Time: 05/27/23  2:10 AM   Result Value Ref Range     (H) 0 - 99 pg/mL   Comprehensive metabolic panel    Collection Time: 05/27/23  2:10 AM   Result Value Ref Range    Sodium 136 136 - 145 mmol/L    Potassium 4.2 3.5 - 5.1 mmol/L    Chloride 97 95 - 110 mmol/L    CO2 27 23 - 29 mmol/L    Glucose 144 (H) 70 - 110 mg/dL    BUN 21 8 - 23 mg/dL    Creatinine 0.7 0.5 - 1.4 mg/dL    Calcium 9.6 8.7 - 10.5 mg/dL    Total Protein 7.0 6.0 - 8.4 g/dL    Albumin 3.9 3.5 - 5.2 g/dL    Total Bilirubin 1.2 (H) 0.1 - 1.0 mg/dL    Alkaline Phosphatase 54 (L) 55 - 135 U/L    AST 24 10 - 40 U/L    ALT 17 10 - 44 U/L    Anion Gap 12 8 - 16 mmol/L    eGFR >60 >60 mL/min/1.73 m^2   Troponin I    Collection  Time: 05/27/23  2:10 AM   Result Value Ref Range    Troponin I 0.006 0.000 - 0.026 ng/mL   D dimer, quantitative    Collection Time: 05/27/23  2:10 AM   Result Value Ref Range    D-Dimer 0.51 (H) <0.50 mg/L FEU   COVID-19 Rapid Screening    Collection Time: 05/27/23  2:23 AM   Result Value Ref Range    SARS-CoV-2 RNA, Amplification, Qual Negative Negative   Influenza A & B by Molecular    Collection Time: 05/27/23  2:23 AM    Specimen: Nasopharyngeal Swab   Result Value Ref Range    Influenza A, Molecular Negative Negative    Influenza B, Molecular Negative Negative    Flu A & B Source Nasal swab    CBC auto differential    Collection Time: 05/27/23  6:22 AM   Result Value Ref Range    WBC 10.04 3.90 - 12.70 K/uL    RBC 4.48 4.00 - 5.40 M/uL    Hemoglobin 13.0 12.0 - 16.0 g/dL    Hematocrit 39.1 37.0 - 48.5 %    MCV 87 82 - 98 fL    MCH 29.0 27.0 - 31.0 pg    MCHC 33.2 32.0 - 36.0 g/dL    RDW 13.6 11.5 - 14.5 %    Platelets 130 (L) 150 - 450 K/uL    MPV 10.3 9.2 - 12.9 fL    Immature Granulocytes 0.5 0.0 - 0.5 %    Gran # (ANC) 8.7 (H) 1.8 - 7.7 K/uL    Immature Grans (Abs) 0.05 (H) 0.00 - 0.04 K/uL    Lymph # 0.7 (L) 1.0 - 4.8 K/uL    Mono # 0.6 0.3 - 1.0 K/uL    Eos # 0.0 0.0 - 0.5 K/uL    Baso # 0.02 0.00 - 0.20 K/uL    nRBC 0 0 /100 WBC    Gran % 86.1 (H) 38.0 - 73.0 %    Lymph % 7.2 (L) 18.0 - 48.0 %    Mono % 6.0 4.0 - 15.0 %    Eosinophil % 0.0 0.0 - 8.0 %    Basophil % 0.2 0.0 - 1.9 %    Differential Method Automated    Basic metabolic panel    Collection Time: 05/27/23  6:22 AM   Result Value Ref Range    Sodium 136 136 - 145 mmol/L    Potassium 3.8 3.5 - 5.1 mmol/L    Chloride 97 95 - 110 mmol/L    CO2 27 23 - 29 mmol/L    Glucose 138 (H) 70 - 110 mg/dL    BUN 20 8 - 23 mg/dL    Creatinine 0.8 0.5 - 1.4 mg/dL    Calcium 9.3 8.7 - 10.5 mg/dL    Anion Gap 12 8 - 16 mmol/L    eGFR >60 >60 mL/min/1.73 m^2       RADIOLOGY  Echo    Result Date: 5/16/2023  · The left ventricle is normal in size with concentric  remodeling and normal systolic function. · Severe left atrial enlargement. · Indeterminate left ventricular diastolic function. · The estimated PA systolic pressure is 87 mmHg. · Moderate right ventricular enlargement with moderately reduced right ventricular systolic function. · There is pulmonary hypertension. · Elevated central venous pressure (15 mmHg). · The estimated ejection fraction is 60%. · There is abnormal septal wall motion. There is systolic flattening of the interventricular septum consistent with right ventricle pressure overload. · Severe right atrial enlargement. · Mild aortic regurgitation. · Moderate mitral regurgitation. · Moderate to severe tricuspid regurgitation. · Mild pulmonic regurgitation.        EKG    MICROBIOLOGY    MDM

## 2023-05-27 NOTE — Clinical Note
Diagnosis: Acute hypoxemic respiratory failure [[id number]]   Admitting Provider:: ANDREE MCKEON [[id number]]   Future Attending Provider: ANDREE MCKEON [[id number]]   Reason for IP Medical Treatment  (Clinical interventions that can only be accomplished in the IP setting? ) :: Respiratory failure   I certify that Inpatient services for greater than or equal to 2 midnights are medically necessary:: No   Plans for Post-Acute care--if anticipated (pick the single best option):: A. No post acute care anticipated at this time   Special Needs:: No Special Needs [1]

## 2023-05-27 NOTE — ASSESSMENT & PLAN NOTE
Patient presents with acute chest meds and ischemia on EKG.  Troponin negative x1.  Associated with acute intrathoracic processes.  BNP elevated at 302 but not very impressed with volume overload. Currently chest pain free. Likely secondary to COPD exacerbation.  Plan:  -telemetry  -continue home medications  -serial EKS at onset of chest pain  -trend troponin  -MAKAYLA therapy prn  -f/u outpatient with cardiology as directed

## 2023-05-27 NOTE — H&P
[facility] - Emergency Dept.  [facility] Medicine  History & Physical    Patient Name: Destini Augustine  MRN: [id number]  Patient Class: IP- Inpatient  Admission Date: 5/27/2023  Attending Physician: Jer Michael MD   Primary Care Provider: Glenn Love MD         Patient information was obtained from patient, past medical records and ER records.     Subjective:     Principal Problem:<principal problem not specified>    Chief Complaint:   Chief Complaint   Patient presents with    Chest Pain     Chest pain, SOB, nausea onset 8 hrs PTA. Hx MI,HTN        HPI: Destini Augustine is a 89 y.o. female with a PMH  has a past medical history of Arthritis, Atrial fibrillation, Basal cell carcinoma, COAG (chronic open-angle glaucoma) - Both Eyes (8/28/2013), Ex-smoker, Hamartoma, Macular degeneration, Osteopenia, Pulmonary heart disease, Scapholunate ligament injury with no instability, and Scoliosis (11/30/15). who presented to the ED complaining of acute onset substernal chest pain and dyspnea prior to arrival.  Patient currently followed by Dr. Rhodes from Cardiology and Dr. Atkins from pulmonology and is being treated for atrial fibrillation and emphysema.  Patient reported being at a ball game when symptoms beginning and was worse upon exertion.  Associated symptoms included nausea and vomiting but denied endorsing any other symptoms. Prior to onset, patient reported being in her usual state of health with all other review of systems negative except those noted above. Patient denied history of heart failure or underlying lung disease and quit smoking in her forties. She denies use of home oxygen, CPAP, or prior admissions for heart failure or copd exacerbations. Patient was provided morphine, aspirin, and Zofran via EMS en route and was administered 20 mg IV lasix x1 in the ED for treatment of CHF. Patient also started on steroids and breathing treatments given hypoxemia and increased oxygen requirements concerning for copd  exacerbation. Patient admitted to Hospital Medicine under observation for continued medical treatment.       PCP: Glenn Love        Past Medical History:   Diagnosis Date    Arthritis     knees, hands    Atrial fibrillation     Basal cell carcinoma     face, right thigh    COAG (chronic open-angle glaucoma) - Both Eyes 8/28/2013    Ex-smoker     Hamartoma     mandible    Macular degeneration     Osteopenia     7/14 shama 7/16    Pulmonary heart disease     Scapholunate ligament injury with no instability     Scoliosis 11/30/15    lumbar x-ray       Past Surgical History:   Procedure Laterality Date    APPENDECTOMY  1970s    CATARACT EXTRACTION Bilateral     DR. Singleton    hysterectomy  1970s    complete    HYSTERECTOMY      about 39yrs old, partial    MOUTH SURGERY Bilateral 02/10/2020    rectocele and cystocele  1998 approx    TONSILLECTOMY, ADENOIDECTOMY  7 y/o       Review of patient's allergies indicates:   Allergen Reactions    Shellfish containing products Diarrhea and Nausea And Vomiting    Venom-wasp Itching and Swelling       No current facility-administered medications on file prior to encounter.     Current Outpatient Medications on File Prior to Encounter   Medication Sig    alendronate (FOSAMAX) 70 MG tablet TAKE 1 TAB WEEKLY IN THE MORNING WITH FULL GLASS OF WATER ON EMPTY STOMACH.DO NOT LIE DOWN FOR AT LEAST 30 MINUTES AFTER    apixaban (ELIQUIS) 2.5 mg Tab Take 1 tablet (2.5 mg total) by mouth 2 (two) times daily.    BIOTIN ORAL Take 1 tablet by mouth 2 (two) times daily. Once daily per patient    CALCIUM CARBONATE/VITAMIN D3 (CALCIUM 600 + D,3, ORAL) Take 1 capsule by mouth 2 (two) times daily.    DOCOSAHEXANOIC ACID/EPA (FISH OIL ORAL) Take 1 capsule by mouth 2 (two) times daily.     GLUCOSAMINE HCL/CHONDRO COBB A (GLUCOSAMINE-CHONDROITIN ORAL) Take 1 tablet by mouth 2 (two) times daily.     latanoprost 0.005 % ophthalmic solution INSTILL 1 DROP INTO BOTH EYES EVERY  NIGHT    metoprolol succinate (TOPROL-XL) 25 MG 24 hr tablet Take 1 tablet (25 mg total) by mouth once daily.    multivitamin (THERAGRAN) per tablet Take 1 tablet by mouth once daily.    timolol maleate 0.5% (TIMOPTIC) 0.5 % Drop PLACE 1 DROP INTO THE RIGHT EYE EVERY MORNING.    turmeric root extract 500 mg Cap Take by mouth. Once daily per patient     Family History       Problem Relation (Age of Onset)    Cancer Father, Brother    Cataracts Mother    Hypertension Mother    Stroke Mother    Thyroid disease Father    Tuberculosis Brother          Tobacco Use    Smoking status: Former     Packs/day: 1.50     Years: 30.00     Pack years: 45.00     Types: Cigarettes     Quit date: 1980     Years since quittin.3    Smokeless tobacco: Never   Substance and Sexual Activity    Alcohol use: Not Currently    Drug use: No    Sexual activity: Never     Partners: Female     Review of Systems   All other systems reviewed and are negative.  Objective:     Vital Signs (Most Recent):  Temp: 98 °F (36.7 °C) (23 0107)  Pulse: 87 (23 0617)  Resp: (!) 24 (23 0602)  BP: (!) 105/59 (23 0617)  SpO2: 99 % (23) Vital Signs (24h Range):  Temp:  [98 °F (36.7 °C)] 98 °F (36.7 °C)  Pulse:  [84-93] 87  Resp:  [16-26] 24  SpO2:  [90 %-100 %] 99 %  BP: (105-137)/(51-77) 105/59     Weight: 60 kg (132 lb 4.8 oz)  Body mass index is 20.72 kg/m².     Physical Exam  Vitals reviewed.   Constitutional:       General: She is not in acute distress.     Appearance: Normal appearance. She is normal weight. She is not ill-appearing, toxic-appearing or diaphoretic.      Comments: Frail appearing. Not septic and in no acute distress.   HENT:      Head: Normocephalic and atraumatic.      Right Ear: External ear normal.      Left Ear: External ear normal.      Nose: Nose normal. No congestion or rhinorrhea.      Mouth/Throat:      Mouth: Mucous membranes are moist.      Pharynx: Oropharynx is clear. No  oropharyngeal exudate or posterior oropharyngeal erythema.      Comments: Poor dentition   Eyes:      General: No scleral icterus.     Extraocular Movements: Extraocular movements intact.      Conjunctiva/sclera: Conjunctivae normal.      Pupils: Pupils are equal, round, and reactive to light.   Neck:      Vascular: No carotid bruit.   Cardiovascular:      Rate and Rhythm: Normal rate and regular rhythm.      Pulses: Normal pulses.      Heart sounds: Normal heart sounds. No murmur heard.    No friction rub. No gallop.   Pulmonary:      Effort: No respiratory distress.      Breath sounds: Normal breath sounds. No stridor. No wheezing, rhonchi or rales.      Comments: Patient tachypneic speaking full sentences without use of accessory muscles noted is evidence of wheezes, rales, or rhonchi noted.  Chest:      Chest wall: No tenderness.   Abdominal:      General: Abdomen is flat. Bowel sounds are normal. There is no distension.      Palpations: Abdomen is soft.      Tenderness: There is no abdominal tenderness. There is no right CVA tenderness, left CVA tenderness, guarding or rebound.      Hernia: No hernia is present.   Musculoskeletal:         General: No swelling, tenderness, deformity or signs of injury. Normal range of motion.      Cervical back: Normal range of motion and neck supple. No rigidity or tenderness.      Right lower leg: No edema.      Left lower leg: No edema.   Lymphadenopathy:      Cervical: No cervical adenopathy.   Skin:     General: Skin is warm and dry.      Capillary Refill: Capillary refill takes less than 2 seconds.      Coloration: Skin is not jaundiced or pale.      Findings: No bruising, erythema, lesion or rash.   Neurological:      General: No focal deficit present.      Mental Status: She is alert and oriented to person, place, and time. Mental status is at baseline.      Cranial Nerves: No cranial nerve deficit.      Sensory: No sensory deficit.      Motor: No weakness.       Coordination: Coordination normal.   Psychiatric:         Mood and Affect: Mood normal.         Behavior: Behavior normal.         Thought Content: Thought content normal.         Judgment: Judgment normal.            CRANIAL NERVES     CN III, IV, VI   Pupils are equal, round, and reactive to light.     Significant Labs: All pertinent labs within the past 24 hours have been reviewed.    Significant Imaging: I have reviewed all pertinent imaging results/findings within the past 24 hours.    LABS:  Recent Results (from the past 24 hour(s))   CBC auto differential    Collection Time: 05/27/23  2:10 AM   Result Value Ref Range    WBC 8.61 3.90 - 12.70 K/uL    RBC 4.62 4.00 - 5.40 M/uL    Hemoglobin 13.7 12.0 - 16.0 g/dL    Hematocrit 40.6 37.0 - 48.5 %    MCV 88 82 - 98 fL    MCH 29.7 27.0 - 31.0 pg    MCHC 33.7 32.0 - 36.0 g/dL    RDW 13.6 11.5 - 14.5 %    Platelets 131 (L) 150 - 450 K/uL    MPV 10.7 9.2 - 12.9 fL    Immature Granulocytes 0.5 0.0 - 0.5 %    Gran # (ANC) 7.4 1.8 - 7.7 K/uL    Immature Grans (Abs) 0.04 0.00 - 0.04 K/uL    Lymph # 0.8 (L) 1.0 - 4.8 K/uL    Mono # 0.4 0.3 - 1.0 K/uL    Eos # 0.0 0.0 - 0.5 K/uL    Baso # 0.02 0.00 - 0.20 K/uL    nRBC 0 0 /100 WBC    Gran % 85.4 (H) 38.0 - 73.0 %    Lymph % 8.8 (L) 18.0 - 48.0 %    Mono % 5.0 4.0 - 15.0 %    Eosinophil % 0.1 0.0 - 8.0 %    Basophil % 0.2 0.0 - 1.9 %    Differential Method Automated    Brain natriuretic peptide    Collection Time: 05/27/23  2:10 AM   Result Value Ref Range     (H) 0 - 99 pg/mL   Comprehensive metabolic panel    Collection Time: 05/27/23  2:10 AM   Result Value Ref Range    Sodium 136 136 - 145 mmol/L    Potassium 4.2 3.5 - 5.1 mmol/L    Chloride 97 95 - 110 mmol/L    CO2 27 23 - 29 mmol/L    Glucose 144 (H) 70 - 110 mg/dL    BUN 21 8 - 23 mg/dL    Creatinine 0.7 0.5 - 1.4 mg/dL    Calcium 9.6 8.7 - 10.5 mg/dL    Total Protein 7.0 6.0 - 8.4 g/dL    Albumin 3.9 3.5 - 5.2 g/dL    Total Bilirubin 1.2 (H) 0.1 - 1.0 mg/dL     Alkaline Phosphatase 54 (L) 55 - 135 U/L    AST 24 10 - 40 U/L    ALT 17 10 - 44 U/L    Anion Gap 12 8 - 16 mmol/L    eGFR >60 >60 mL/min/1.73 m^2   Troponin I    Collection Time: 05/27/23  2:10 AM   Result Value Ref Range    Troponin I 0.006 0.000 - 0.026 ng/mL   D dimer, quantitative    Collection Time: 05/27/23  2:10 AM   Result Value Ref Range    D-Dimer 0.51 (H) <0.50 mg/L FEU   COVID-19 Rapid Screening    Collection Time: 05/27/23  2:23 AM   Result Value Ref Range    SARS-CoV-2 RNA, Amplification, Qual Negative Negative   Influenza A & B by Molecular    Collection Time: 05/27/23  2:23 AM    Specimen: Nasopharyngeal Swab   Result Value Ref Range    Influenza A, Molecular Negative Negative    Influenza B, Molecular Negative Negative    Flu A & B Source Nasal swab    CBC auto differential    Collection Time: 05/27/23  6:22 AM   Result Value Ref Range    WBC 10.04 3.90 - 12.70 K/uL    RBC 4.48 4.00 - 5.40 M/uL    Hemoglobin 13.0 12.0 - 16.0 g/dL    Hematocrit 39.1 37.0 - 48.5 %    MCV 87 82 - 98 fL    MCH 29.0 27.0 - 31.0 pg    MCHC 33.2 32.0 - 36.0 g/dL    RDW 13.6 11.5 - 14.5 %    Platelets 130 (L) 150 - 450 K/uL    MPV 10.3 9.2 - 12.9 fL    Immature Granulocytes 0.5 0.0 - 0.5 %    Gran # (ANC) 8.7 (H) 1.8 - 7.7 K/uL    Immature Grans (Abs) 0.05 (H) 0.00 - 0.04 K/uL    Lymph # 0.7 (L) 1.0 - 4.8 K/uL    Mono # 0.6 0.3 - 1.0 K/uL    Eos # 0.0 0.0 - 0.5 K/uL    Baso # 0.02 0.00 - 0.20 K/uL    nRBC 0 0 /100 WBC    Gran % 86.1 (H) 38.0 - 73.0 %    Lymph % 7.2 (L) 18.0 - 48.0 %    Mono % 6.0 4.0 - 15.0 %    Eosinophil % 0.0 0.0 - 8.0 %    Basophil % 0.2 0.0 - 1.9 %    Differential Method Automated    Basic metabolic panel    Collection Time: 05/27/23  6:22 AM   Result Value Ref Range    Sodium 136 136 - 145 mmol/L    Potassium 3.8 3.5 - 5.1 mmol/L    Chloride 97 95 - 110 mmol/L    CO2 27 23 - 29 mmol/L    Glucose 138 (H) 70 - 110 mg/dL    BUN 20 8 - 23 mg/dL    Creatinine 0.8 0.5 - 1.4 mg/dL    Calcium 9.3 8.7 -  10.5 mg/dL    Anion Gap 12 8 - 16 mmol/L    eGFR >60 >60 mL/min/1.73 m^2       RADIOLOGY  Echo    Result Date: 5/16/2023  · The left ventricle is normal in size with concentric remodeling and normal systolic function. · Severe left atrial enlargement. · Indeterminate left ventricular diastolic function. · The estimated PA systolic pressure is 87 mmHg. · Moderate right ventricular enlargement with moderately reduced right ventricular systolic function. · There is pulmonary hypertension. · Elevated central venous pressure (15 mmHg). · The estimated ejection fraction is 60%. · There is abnormal septal wall motion. There is systolic flattening of the interventricular septum consistent with right ventricle pressure overload. · Severe right atrial enlargement. · Mild aortic regurgitation. · Moderate mitral regurgitation. · Moderate to severe tricuspid regurgitation. · Mild pulmonic regurgitation.        EKG    MICROBIOLOGY    MDM      Assessment/Plan:     Dyspnea        COPD exacerbation        Pulmonary emphysema  Patient with history of COPD/severe emphysema currently on inhalers but not home oxygen presented with acute onset dyspnea with signs/symptoms of distress and hypoxemia concerning for acute exacerbation. Patient currently saturating 100% on 4 L/min.  Chest x-ray positive for hyper inflated lungs without evidence of opacity/infectious processes noted.  D-dimer elevated measuring 0.51 per within normal limits according to aid adjustment.  BNP elevated measuring 302 without evidence of volume overload concerning for CHF exacerbation.  Patient flu/COVID negative.  Patient initiated on prednisone and DuoNebs.    Plan:  -Continue home medications, titrate as needed  -Titrate oxygen requirements as needed  -Incentive spirometry   -Monitor pulse oximetry  -Duonebs prn      Chest pain  Patient presents with acute chest meds and ischemia on EKG.  Troponin negative x1.  Associated with acute intrathoracic processes.  BNP  elevated at 302 but not very impressed with volume overload. Currently chest pain free. Likely secondary to COPD exacerbation.  Plan:  -telemetry  -continue home medications  -serial EKS at onset of chest pain  -trend troponin  -MAKAYLA therapy prn  -f/u outpatient with cardiology as directed       Elevated brain natriuretic peptide (BNP) level  Patient presented with acute onset dyspnea 7 elevated BNP measuring 302. Currently follows Dr. Rhodes from cardiology with last reported echo noted below. CXR negative for edema/effusions with physical exam negative for elevated JVD, crackles, or lower extremity edema. Patient s/p 20 mg IV lasix x1 in the ED.     Echo 5/16/23   The left ventricle is normal in size with concentric remodeling and normal systolic function.   Severe left atrial enlargement.   Indeterminate left ventricular diastolic function.   The estimated PA systolic pressure is 87 mmHg.   Moderate right ventricular enlargement with moderately reduced right ventricular systolic function.   There is pulmonary hypertension.   Elevated central venous pressure (15 mmHg).   The estimated ejection fraction is 60%.   There is abnormal septal wall motion. There is systolic flattening of the interventricular septum consistent with right ventricle pressure overload.   Severe right atrial enlargement.   Mild aortic regurgitation.   Moderate mitral regurgitation.   Moderate to severe tricuspid regurgitation.   Mild pulmonic regurgitation.    Plan:  -monitor I/O  -continue home medications  -f/u outpatient with cardiology      Atrial fibrillation, permanent  Patient with Permanent atrial fibrillation which is uncontrolled currently with Beta Blocker. Patient is currently in atrial fibrillation.[id number] Score: 3. Anticoagulation indicated. Anticoagulation done with Eliquis.      VTE Risk Mitigation (From admission, onward)         Ordered     apixaban tablet 2.5 mg  Daily         05/27/23 0730     IP VTE HIGH  RISK PATIENT  Once         05/27/23 0544     Place sequential compression device  Until discontinued         05/27/23 0544              //Core Measures   -DVT proph: SCDs, Eliquis   -Code status: Full    -Surrogate: none provided       Components of this note were documented using a voice recognition system and are subject to errors not corrected at the time the document was proof read. Please contact the author for any clarifications.        Jer Michael MD  Department of Hospital Medicine  '[place] - Emergency Dept.

## 2023-05-27 NOTE — CONSULTS
[facility] ([facility])  Cardiology  Consult Note    Patient Name: Destini Augustine  MRN: [id number]  Admission Date: 5/27/2023  Hospital Length of Stay: 0 days  Code Status: Full Code   Attending Provider: Olena Parnell MD   Consulting Provider: Jaylen Taylor MD  Primary Care Physician: Glenn Love MD  Principal Problem:<principal problem not specified>    Patient information was obtained from patient and ER records.     Inpatient consult to Cardiology  Consult performed by: Jaylen Taylor MD  Consult ordered by: Olena Parnell MD        Subjective:       HPI:   presented to the ED complaining of acute onset substernal chest pain and dyspnea prior to arrival.      89 yo female, cardiology consult for afib and chest pain  PMH permanent a fib, PHTN, TR, MR, aortic valve sclerosis. Copd/emphysema. F/u cardiologist Dr. Juaquin Rhodes  C/o chest pain and SOB for a day.   Echo in  EF 60% mod RV dysfunction, pulm HTN mod to severe TR, mod MR  Today ekg AFIB VR C  Cxr no acute issue, , Troponin negative, Cr 0.8 and HGb 13  Rx for copd exacerbation  Now AFIB VR C and chest pain free        Past Medical History:   Diagnosis Date    Arthritis     knees, hands    Atrial fibrillation     Basal cell carcinoma     face, right thigh    COAG (chronic open-angle glaucoma) - Both Eyes 8/28/2013    COPD exacerbation 5/27/2023    Ex-smoker     Hamartoma     mandible    Macular degeneration     Osteopenia     7/14 shama 7/16    Pulmonary heart disease     Scapholunate ligament injury with no instability     Scoliosis 11/30/15    lumbar x-ray       Past Surgical History:   Procedure Laterality Date    APPENDECTOMY  1970s    CATARACT EXTRACTION Bilateral     DR. Singleton    hysterectomy  1970s    complete    HYSTERECTOMY      about 39yrs old, partial    MOUTH SURGERY Bilateral 02/10/2020    rectocele and cystocele  1998 approx    TONSILLECTOMY, ADENOIDECTOMY  7 y/o       Review of patient's allergies indicates:    Allergen Reactions    Shellfish containing products Diarrhea and Nausea And Vomiting    Venom-wasp Itching and Swelling       No current facility-administered medications on file prior to encounter.     Current Outpatient Medications on File Prior to Encounter   Medication Sig    alendronate (FOSAMAX) 70 MG tablet TAKE 1 TAB WEEKLY IN THE MORNING WITH FULL GLASS OF WATER ON EMPTY STOMACH.DO NOT LIE DOWN FOR AT LEAST 30 MINUTES AFTER    apixaban (ELIQUIS) 2.5 mg Tab Take 1 tablet (2.5 mg total) by mouth 2 (two) times daily.    latanoprost 0.005 % ophthalmic solution INSTILL 1 DROP INTO BOTH EYES EVERY NIGHT    metoprolol succinate (TOPROL-XL) 25 MG 24 hr tablet Take 1 tablet (25 mg total) by mouth once daily.    multivitamin (THERAGRAN) per tablet Take 1 tablet by mouth once daily.    timolol maleate 0.5% (TIMOPTIC) 0.5 % Drop PLACE 1 DROP INTO THE RIGHT EYE EVERY MORNING.    turmeric root extract 500 mg Cap Take 500 mg by mouth once daily.    [DISCONTINUED] BIOTIN ORAL Take 1 tablet by mouth 2 (two) times daily. Once daily per patient    [DISCONTINUED] CALCIUM CARBONATE/VITAMIN D3 (CALCIUM 600 + D,3, ORAL) Take 1 capsule by mouth 2 (two) times daily.    [DISCONTINUED] DOCOSAHEXANOIC ACID/EPA (FISH OIL ORAL) Take 1 capsule by mouth 2 (two) times daily.     [DISCONTINUED] GLUCOSAMINE HCL/CHONDRO COBB A (GLUCOSAMINE-CHONDROITIN ORAL) Take 1 tablet by mouth 2 (two) times daily.      Family History       Problem Relation (Age of Onset)    Cancer Father, Brother    Cataracts Mother    Hypertension Mother    Stroke Mother    Thyroid disease Father    Tuberculosis Brother          Tobacco Use    Smoking status: Former     Packs/day: 1.50     Years: 30.00     Pack years: 45.00     Types: Cigarettes     Quit date: 1980     Years since quittin.3    Smokeless tobacco: Never   Substance and Sexual Activity    Alcohol use: Not Currently    Drug use: No    Sexual activity: Never     Partners: Female      Review of Systems   Constitutional: Negative for decreased appetite, diaphoresis, fever, malaise/fatigue and night sweats.   HENT:  Negative for nosebleeds.    Eyes:  Negative for blurred vision and double vision.   Cardiovascular:  Positive for dyspnea on exertion. Negative for chest pain, claudication, irregular heartbeat, leg swelling, near-syncope, orthopnea, palpitations, paroxysmal nocturnal dyspnea and syncope.   Respiratory:  Negative for cough, shortness of breath, sleep disturbances due to breathing, snoring, sputum production and wheezing.    Endocrine: Negative for cold intolerance and polyuria.   Hematologic/Lymphatic: Does not bruise/bleed easily.   Skin:  Negative for rash.   Musculoskeletal:  Negative for back pain, falls, joint pain, joint swelling and neck pain.   Gastrointestinal:  Negative for abdominal pain, heartburn, nausea and vomiting.   Genitourinary:  Negative for dysuria, frequency and hematuria.   Neurological:  Negative for difficulty with concentration, dizziness, focal weakness, headaches, light-headedness, numbness, seizures and weakness.   Psychiatric/Behavioral:  Negative for depression, memory loss and substance abuse. The patient does not have insomnia.    Allergic/Immunologic: Negative for HIV exposure and hives.   Objective:     Vital Signs (Most Recent):  Temp: 98.2 °F (36.8 °C) (05/27/23 1412)  Pulse: 84 (05/27/23 1413)  Resp: 18 (05/27/23 1413)  BP: 102/60 (05/27/23 1408)  SpO2: 96 % (05/27/23 1413) Vital Signs (24h Range):  Temp:  [98 °F (36.7 °C)-98.2 °F (36.8 °C)] 98.2 °F (36.8 °C)  Pulse:  [81-96] 84  Resp:  [16-26] 18  SpO2:  [89 %-100 %] 96 %  BP: ()/(49-77) 102/60     Weight: 60 kg (132 lb 4.8 oz)  Body mass index is 20.72 kg/m².    SpO2: 96 %       No intake or output data in the 24 hours ending 05/27/23 1710    Lines/Drains/Airways       Peripheral Intravenous Line  Duration                  Peripheral IV - Single Lumen 05/27/23 20 G Right Forearm <1 day                      Physical Exam  HENT:      Head: Normocephalic.   Eyes:      Pupils: Pupils are equal, round, and reactive to light.   Neck:      Thyroid: No thyromegaly.      Vascular: Normal carotid pulses. No carotid bruit or JVD.   Cardiovascular:      Rate and Rhythm: Normal rate. Rhythm irregularly irregular. No extrasystoles are present.     Chest Wall: PMI is not displaced.      Pulses: Normal pulses.      Heart sounds: Normal heart sounds. No murmur heard.    No gallop. No S3 sounds.   Pulmonary:      Effort: No respiratory distress.      Breath sounds: No stridor. Decreased breath sounds present.   Abdominal:      General: Bowel sounds are normal.      Palpations: Abdomen is soft.      Tenderness: There is no abdominal tenderness. There is no rebound.   Musculoskeletal:         General: Normal range of motion.   Skin:     Findings: No rash.   Neurological:      Mental Status: She is alert and oriented to person, place, and time.   Psychiatric:         Behavior: Behavior normal.        Significant Labs: ABG: No results for input(s): PH, PCO2, HCO3, POCSATURATED, BE in the last 48 hours., Blood Culture: No results for input(s): LABBLOO in the last 48 hours., BMP:   Recent Labs   Lab 05/27/23  0210 05/27/23  0622   * 138*    136   K 4.2 3.8   CL 97 97   CO2 27 27   BUN 21 20   CREATININE 0.7 0.8   CALCIUM 9.6 9.3   , CMP   Recent Labs   Lab 05/27/23  0210 05/27/23  0622    136   K 4.2 3.8   CL 97 97   CO2 27 27   * 138*   BUN 21 20   CREATININE 0.7 0.8   CALCIUM 9.6 9.3   PROT 7.0  --    ALBUMIN 3.9  --    BILITOT 1.2*  --    ALKPHOS 54*  --    AST 24  --    ALT 17  --    ANIONGAP 12 12   , CBC   Recent Labs   Lab 05/27/23  0210 05/27/23  0622   WBC 8.61 10.04   HGB 13.7 13.0   HCT 40.6 39.1   * 130*   , INR No results for input(s): INR, PROTIME in the last 48 hours., Lipid Panel No results for input(s): CHOL, HDL, LDLCALC, TRIG, CHOLHDL in the last 48 hours., and Troponin    Recent Labs   Lab 05/27/23  0210   TROPONINI 0.006       Significant Imaging: EKG:      Assessment and Plan:     Chest pain  SOB CP   Troponin negative x1 and ekg showed no acute STT change    Pain resolved after emphysema Rx  Repeat troponin x1     Pulmonary emphysema  Rx per HM    Atrial fibrillation, permanent  Now FIB VR controlled    Continue eliquis and BB          VTE Risk Mitigation (From admission, onward)         Ordered     apixaban tablet 2.5 mg  Daily         05/27/23 0730     IP VTE HIGH RISK PATIENT  Once         05/27/23 0544     Place sequential compression device  Until discontinued         05/27/23 0544                Thank you for your consult. I will follow-up with patient. Please contact us if you have any additional questions.    Jaylen Taylor MD  Cardiology   [facility] ([facility])

## 2023-05-27 NOTE — HPI
Destini Augustine is a 89 y.o. female with a PMH  has a past medical history of Arthritis, Atrial fibrillation, Basal cell carcinoma, COAG (chronic open-angle glaucoma) - Both Eyes (8/28/2013), Ex-smoker, Hamartoma, Macular degeneration, Osteopenia, Pulmonary heart disease, Scapholunate ligament injury with no instability, and Scoliosis (11/30/15). who presented to the ED complaining of acute onset substernal chest pain and dyspnea prior to arrival.  Patient currently followed by Dr. Rhodes from Cardiology and Dr. Atkins from pulmonology and is being treated for atrial fibrillation and emphysema.  Patient reported being at a ball game when symptoms beginning and was worse upon exertion.  Associated symptoms included nausea and vomiting but denied endorsing any other symptoms. Prior to onset, patient reported being in her usual state of health with all other review of systems negative except those noted above. Patient denied history of heart failure or underlying lung disease and quit smoking in her forties. She denies use of home oxygen, CPAP, or prior admissions for heart failure or copd exacerbations. Patient was provided morphine, aspirin, and Zofran via EMS en route and was administered 20 mg IV lasix x1 in the ED for treatment of CHF. Patient also started on steroids and breathing treatments given hypoxemia and increased oxygen requirements concerning for copd exacerbation. Patient admitted to Hospital Medicine under observation for continued medical treatment.       PCP: Glenn Love     Patient reports feeling better after antibiotics and fluids started  She reports feeling anxious and occasionally has palpitations  Her abdomen is appropriately tender, and her incision is clean, dry, and intact  Discussed case with Dr Gayle Baldwin of cardiology, who recommended metoprolol or diltiazem if SVT present  No further recommendations based on EKG which showed sinus tachycardia  COVID/FLU/RSV negative  Blood and urine cultures pending  Lactate normal  Hgb 7 1, electrolytes largely unremarkable  Discussed case with Dr Anahy Simeon of Infectious Disease, will change antibiotics to cefepime, vancomycin, and flagyl  Dr Angela Tyson aware      Trey Larsen MD  OB/GYN PGY-3  4/11/2022  1:06 AM

## 2023-05-28 VITALS
HEIGHT: 67 IN | SYSTOLIC BLOOD PRESSURE: 106 MMHG | RESPIRATION RATE: 17 BRPM | TEMPERATURE: 99 F | DIASTOLIC BLOOD PRESSURE: 58 MMHG | OXYGEN SATURATION: 91 % | HEART RATE: 85 BPM | BODY MASS INDEX: 20.83 KG/M2 | WEIGHT: 132.69 LBS

## 2023-05-28 LAB
TROPONIN I SERPL DL<=0.01 NG/ML-MCNC: 0.02 NG/ML (ref 0–0.03)
TROPONIN I SERPL DL<=0.01 NG/ML-MCNC: 0.02 NG/ML (ref 0–0.03)

## 2023-05-28 PROCEDURE — 94761 N-INVAS EAR/PLS OXIMETRY MLT: CPT

## 2023-05-28 PROCEDURE — 36415 COLL VENOUS BLD VENIPUNCTURE: CPT | Performed by: INTERNAL MEDICINE

## 2023-05-28 PROCEDURE — 99900035 HC TECH TIME PER 15 MIN (STAT)

## 2023-05-28 PROCEDURE — 25000003 PHARM REV CODE 250: Performed by: FAMILY MEDICINE

## 2023-05-28 PROCEDURE — 84484 ASSAY OF TROPONIN QUANT: CPT | Performed by: INTERNAL MEDICINE

## 2023-05-28 PROCEDURE — 25000242 PHARM REV CODE 250 ALT 637 W/ HCPCS: Performed by: FAMILY MEDICINE

## 2023-05-28 PROCEDURE — 99232 PR SUBSEQUENT HOSPITAL CARE,LEVL II: ICD-10-PCS | Mod: ,,, | Performed by: INTERNAL MEDICINE

## 2023-05-28 PROCEDURE — 36415 COLL VENOUS BLD VENIPUNCTURE: CPT | Performed by: FAMILY MEDICINE

## 2023-05-28 PROCEDURE — 94640 AIRWAY INHALATION TREATMENT: CPT

## 2023-05-28 PROCEDURE — 94799 UNLISTED PULMONARY SVC/PX: CPT

## 2023-05-28 PROCEDURE — 63600175 PHARM REV CODE 636 W HCPCS: Performed by: FAMILY MEDICINE

## 2023-05-28 PROCEDURE — 84484 ASSAY OF TROPONIN QUANT: CPT | Mod: 91 | Performed by: FAMILY MEDICINE

## 2023-05-28 PROCEDURE — 99232 SBSQ HOSP IP/OBS MODERATE 35: CPT | Mod: ,,, | Performed by: INTERNAL MEDICINE

## 2023-05-28 PROCEDURE — 63600175 PHARM REV CODE 636 W HCPCS: Performed by: HOSPITALIST

## 2023-05-28 PROCEDURE — 25000003 PHARM REV CODE 250: Performed by: HOSPITALIST

## 2023-05-28 RX ORDER — METHYLPREDNISOLONE 4 MG/1
TABLET ORAL
Qty: 21 EACH | Refills: 0 | Status: SHIPPED | OUTPATIENT
Start: 2023-05-28 | End: 2023-06-18

## 2023-05-28 RX ORDER — ONDANSETRON 2 MG/ML
4 INJECTION INTRAMUSCULAR; INTRAVENOUS EVERY 6 HOURS PRN
Status: DISCONTINUED | OUTPATIENT
Start: 2023-05-28 | End: 2023-05-28 | Stop reason: HOSPADM

## 2023-05-28 RX ORDER — ONDANSETRON HYDROCHLORIDE 4 MG/5ML
4 SOLUTION ORAL EVERY 6 HOURS PRN
Status: DISCONTINUED | OUTPATIENT
Start: 2023-05-28 | End: 2023-05-28

## 2023-05-28 RX ADMIN — IPRATROPIUM BROMIDE AND ALBUTEROL SULFATE 3 ML: .5; 3 SOLUTION RESPIRATORY (INHALATION) at 01:05

## 2023-05-28 RX ADMIN — METOPROLOL SUCCINATE 12.5 MG: 25 TABLET, EXTENDED RELEASE ORAL at 08:05

## 2023-05-28 RX ADMIN — ONDANSETRON 4 MG: 2 INJECTION INTRAMUSCULAR; INTRAVENOUS at 09:05

## 2023-05-28 RX ADMIN — AMOXICILLIN AND CLAVULANATE POTASSIUM 1 TABLET: 875; 125 TABLET, FILM COATED ORAL at 08:05

## 2023-05-28 RX ADMIN — APIXABAN 2.5 MG: 2.5 TABLET, FILM COATED ORAL at 08:05

## 2023-05-28 RX ADMIN — PREDNISONE 40 MG: 20 TABLET ORAL at 08:05

## 2023-05-28 NOTE — ASSESSMENT & PLAN NOTE
Patient presents with acute chest meds and ischemia on EKG.  Troponin negative x1.  Associated with acute intrathoracic processes.  BNP elevated at 302 but not very impressed with volume overload. Currently chest pain free. Likely secondary to COPD exacerbation.  Plan:  -telemetry  -continue home medications  -serial EKG at onset of chest pain  -trend troponin - negative x 3  -f/u outpatient with cardiology as directed

## 2023-05-28 NOTE — DISCHARGE SUMMARY
[facility] ([facility])  [facility] Medicine  Discharge Summary      Patient Name: Destini Augustine  MRN: [id number]  AMINAH: [id number]  Patient Class: IP- Inpatient  Admission Date: 5/27/2023  Hospital Length of Stay: 1 days  Discharge Date and Time:  05/28/2023 3:10 PM  Attending Physician: Fortino Hull MD   Discharging Provider: Fortino Hull MD  Primary Care Provider: Glenn Love MD    Primary Care Team: Networked reference to record PCT     HPI:   Destini Augustine is a 89 y.o. female with a PMH  has a past medical history of Arthritis, Atrial fibrillation, Basal cell carcinoma, COAG (chronic open-angle glaucoma) - Both Eyes (8/28/2013), Ex-smoker, Hamartoma, Macular degeneration, Osteopenia, Pulmonary heart disease, Scapholunate ligament injury with no instability, and Scoliosis (11/30/15). who presented to the ED complaining of acute onset substernal chest pain and dyspnea prior to arrival.  Patient currently followed by Dr. Rhodes from Cardiology and Dr. Atkins from pulmonology and is being treated for atrial fibrillation and emphysema.  Patient reported being at a ball game when symptoms beginning and was worse upon exertion.  Associated symptoms included nausea and vomiting but denied endorsing any other symptoms. Prior to onset, patient reported being in her usual state of health with all other review of systems negative except those noted above. Patient denied history of heart failure or underlying lung disease and quit smoking in her forties. She denies use of home oxygen, CPAP, or prior admissions for heart failure or copd exacerbations. Patient was provided morphine, aspirin, and Zofran via EMS en route and was administered 20 mg IV lasix x1 in the ED for treatment of CHF. Patient also started on steroids and breathing treatments given hypoxemia and increased oxygen requirements concerning for copd exacerbation. Patient admitted to Hospital Medicine under observation for continued medical  treatment.       PCP: Glenn Love        * No surgery found *      Hospital Course:   Ms. Augustine is a 89 yo female admitted yesterday morning with chest pain and shortness of breathe.  Her troponin x 3 were negative and cardiology evaluated the patient and cleared for discharge.  She has a known his of COPD (no home o2) and appeared to be in exacerbation on admit.  She was given steroids and duonebs and was weaned off the O2.  Patient follows with Dr. Bae outpatient and recommended follow up upon discharge.         Goals of Care Treatment Preferences:  Code Status: Full Code      Consults:   Consults (From admission, onward)        Status Ordering Provider     Inpatient consult to Cardiology  Once        Provider:  Jaylen Taylor MD    Completed KATY ZAMBRANO     Inpatient consult to Respiratory Care  Once        Provider:  (Not yet assigned)    ANDREE Couch          Pulmonary  * COPD exacerbation  - see Emphysema      Pulmonary emphysema  Patient with history of COPD/severe emphysema currently on inhalers but not home oxygen presented with acute onset dyspnea with signs/symptoms of distress and hypoxemia concerning for acute exacerbation. Patient currently saturating 100% on 4 L/min.  Chest x-ray positive for hyper inflated lungs without evidence of opacity/infectious processes noted.  D-dimer elevated measuring 0.51 per within normal limits according to aid adjustment.  BNP elevated measuring 302 without evidence of volume overload concerning for CHF exacerbation.  Patient flu/COVID negative.  Patient initiated on prednisone and DuoNebs.    Plan:  -Continue home medications  -on RA >88%  -Steroid dose pack  -Incentive spirometry   -Monitor pulse oximetry  -Duonebs prn      Cardiac/Vascular  Chest pain  Patient presents with acute chest meds and ischemia on EKG.  Troponin negative x1.  Associated with acute intrathoracic processes.  BNP elevated at 302 but not very impressed with volume  overload. Currently chest pain free. Likely secondary to COPD exacerbation.  Plan:  -telemetry  -continue home medications  -serial EKG at onset of chest pain  -trend troponin - negative x 3  -f/u outpatient with cardiology as directed       Elevated brain natriuretic peptide (BNP) level  Patient presented with acute onset dyspnea 7 elevated BNP measuring 302. Currently follows Dr. Rhodes from cardiology with last reported echo noted below. CXR negative for edema/effusions with physical exam negative for elevated JVD, crackles, or lower extremity edema. Patient s/p 20 mg IV lasix x1 in the ED.     Echo 5/16/23   The left ventricle is normal in size with concentric remodeling and normal systolic function.   Severe left atrial enlargement.   Indeterminate left ventricular diastolic function.   The estimated PA systolic pressure is 87 mmHg.   Moderate right ventricular enlargement with moderately reduced right ventricular systolic function.   There is pulmonary hypertension.   Elevated central venous pressure (15 mmHg).   The estimated ejection fraction is 60%.   There is abnormal septal wall motion. There is systolic flattening of the interventricular septum consistent with right ventricle pressure overload.   Severe right atrial enlargement.   Mild aortic regurgitation.   Moderate mitral regurgitation.   Moderate to severe tricuspid regurgitation.   Mild pulmonic regurgitation.    Plan:  -monitor I/O  -continue home medications  -f/u outpatient with cardiology - appt tomorrow scheduled      Atrial fibrillation, permanent  Patient with Permanent atrial fibrillation which is uncontrolled currently with Beta Blocker. Patient is currently in atrial fibrillation.[id number] Score: 3. Anticoagulation indicated. Anticoagulation done with Eliquis.      Final Active Diagnoses:    Diagnosis Date Noted POA    PRINCIPAL PROBLEM:  COPD exacerbation [J44.1] 05/27/2023 Yes    Elevated brain natriuretic peptide (BNP)  level [R79.89] 05/27/2023 Yes    Chest pain [R07.9] 05/27/2023 Yes    Dyspnea [R06.00] 05/27/2023 Yes    Pulmonary emphysema [J43.9] 12/20/2022 Yes    Atrial fibrillation, permanent [I48.21] 05/01/2017 Yes     Chronic      Problems Resolved During this Admission:    Diagnosis Date Noted Date Resolved POA    Apical lung scarring [J98.4] 12/20/2022 05/27/2023 Yes    Pulmonary hypertension [I27.20] 05/24/2022 05/27/2023 Yes       Discharged Condition: stable    Disposition: Home or Self Care    Follow Up:   Follow-up Information     Juaquin Rhodes MD Follow up on 5/29/2023.    Specialties: Interventional Cardiology, Cardiology  Contact information:  [id number]  [place] [address]  [phone]             Elgin Bae MD Follow up in 3 day(s).    Specialties: Critical Care Medicine, Pulmonary Disease  Why: Hospital follow up for COPD exacerbation  Contact information:  [id number]  [place] [address]  [phone]                       Patient Instructions:      Diet Adult Regular     Activity as tolerated       Significant Diagnostic Studies: Labs:   BMP:   Recent Labs   Lab 05/27/23  0210 05/27/23  0622   * 138*    136   K 4.2 3.8   CL 97 97   CO2 27 27   BUN 21 20   CREATININE 0.7 0.8   CALCIUM 9.6 9.3   , CBC   Recent Labs   Lab 05/27/23  0210 05/27/23  0622   WBC 8.61 10.04   HGB 13.7 13.0   HCT 40.6 39.1   * 130*    and Troponin   Recent Labs   Lab 05/27/23  0210 05/28/23  0418 05/28/23  1034   TROPONINI 0.006 0.021 0.022     Radiology:   X-Ray Chest AP Portable   Final Result      No acute findings.         Electronically signed by: Jaspreet Taylor MD   Date:    05/27/2023   Time:    08:03          Pending Diagnostic Studies:     None         Medications:  Reconciled Home Medications:      Medication List      START taking these medications    methylPREDNISolone 4 mg tablet  Commonly known as: MEDROL DOSEPACK  use as directed        CONTINUE taking these  medications    alendronate 70 MG tablet  Commonly known as: FOSAMAX  TAKE 1 TAB WEEKLY IN THE MORNING WITH FULL GLASS OF WATER ON EMPTY STOMACH.DO NOT LIE DOWN FOR AT LEAST 30 MINUTES AFTER     apixaban 2.5 mg Tab  Commonly known as: ELIQUIS  Take 1 tablet (2.5 mg total) by mouth 2 (two) times daily.     latanoprost 0.005 % ophthalmic solution  INSTILL 1 DROP INTO BOTH EYES EVERY NIGHT     metoprolol succinate 25 MG 24 hr tablet  Commonly known as: TOPROL-XL  Take 1 tablet (25 mg total) by mouth once daily.     multivitamin per tablet  Commonly known as: THERAGRAN  Take 1 tablet by mouth once daily.     timolol maleate 0.5% 0.5 % Drop  Commonly known as: TIMOPTIC  PLACE 1 DROP INTO THE RIGHT EYE EVERY MORNING.     turmeric root extract 500 mg Cap  Take 500 mg by mouth once daily.            Indwelling Lines/Drains at time of discharge:   Lines/Drains/Airways     None                 Time spent on the discharge of patient: 45 minutes         Fortino Hull MD  Department of Hospital Medicine  [facility] ([facility])

## 2023-05-28 NOTE — HOSPITAL COURSE
89 yo female, cardiology consult for afib and chest pain  PMH permanent a fib, PHTN, TR, MR, aortic valve sclerosis. Copd/emphysema. F/u cardiologist Dr. Juaquin Rhodes  C/o chest pain and SOB for a day.   Echo in  EF 60% mod RV dysfunction, pulm HTN mod to severe TR, mod MR  Today ekg AFIB VR C  Cxr no acute issue, , Troponin negative, Cr 0.8 and HGb 13  Rx for copd exacerbation  Now AFIB VR C and chest pain free    05/28  AFIB VR controlled, SOB improved, mild chest tightness troponin negative x3.

## 2023-05-28 NOTE — PLAN OF CARE
[facility] - Telemetry (Hospital)  Initial Discharge Assessment       Primary Care Provider: Glenn Love MD    Admission Diagnosis: Chest pain [R07.9]  Acute hypoxemic respiratory failure [J96.01]    Admission Date: 5/27/2023  Expected Discharge Date:     Transition of Care Barriers: (P) None    Payor: HUMANA MANAGED MEDICARE / Plan: HUMANA MEDICARE HMO / Product Type: Capitation /     Extended Emergency Contact Information  Primary Emergency Contact: Bradley Augustine   [place]  Mobile Phone: [phone]  Relation: Son    Discharge Plan A: (P) Home         [facility] [place] [place] [facility] [address] [facility]  [address] [facility]  [facility] [place] [address]  Phone: [phone] Fax: [phone]    [facility] - [facility] [address] [facility]  [address] [facility] [address]  Phone: [phone] Fax: [phone]    Initial Assessment (most recent)       Adult Discharge Assessment - 05/28/23 1045          Discharge Assessment    Assessment Type Discharge Planning Assessment     Confirmed/corrected address, phone number and insurance Yes     Confirmed Demographics Correct on Facesheet     Source of Information patient     When was your last doctors appointment? 04/26/23     Communicated FROILAN with patient/caregiver Date not available/Unable to determine     Reason For Admission SOB     People in Home alone     Do you expect to return to your current living situation? Yes     Do you have help at home or someone to help you manage your care at home? No     Current cognitive status: Alert/Oriented     Home Accessibility wheelchair accessible;stairs to enter home     Number of Stairs, Main Entrance one     Surface of Stairs, Main Entrance concrete     Stair Railings, Main Entrance railings safe and in good condition;railings on both sides of stairs     Home Layout Able to live on 1st floor     Equipment Currently Used at Home none     Readmission  within 30 days? No     Patient currently being followed by outpatient case management? No     Do you currently have service(s) that help you manage your care at home? No     Do you take prescription medications? Yes     Do you have prescription coverage? Yes     Coverage HUMANA MANAGED MEDICARE - HUMANA MEDICARE O     Do you have any problems affording any of your prescribed medications? No     Is the patient taking medications as prescribed? yes     Who is going to help you get home at discharge? Bradley Augustine (Son) (P)      How do you get to doctors appointments? car, drives self     Are you on dialysis? No     Do you take coumadin? No     Discharge Plan A Home     DME Needed Upon Discharge  none     Discharge Plan discussed with: Patient     Transition of Care Barriers None        Physical Activity    On average, how many days per week do you engage in moderate to strenuous exercise (like a brisk walk)? 4 days     On average, how many minutes do you engage in exercise at this level? 60 min        Financial Resource Strain    How hard is it for you to pay for the very basics like food, housing, medical care, and heating? Not hard at all        Housing Stability    In the last 12 months, was there a time when you were not able to pay the mortgage or rent on time? No     In the last 12 months, was there a time when you did not have a steady place to sleep or slept in a shelter (including now)? No        Transportation Needs    In the past 12 months, has lack of transportation kept you from medical appointments or from getting medications? No     In the past 12 months, has lack of transportation kept you from meetings, work, or from getting things needed for daily living? No        Food Insecurity    Within the past 12 months, you worried that your food would run out before you got the money to buy more. Never true     Within the past 12 months, the food you bought just didn't last and you didn't have money to get  more. Never true        Stress    Do you feel stress - tense, restless, nervous, or anxious, or unable to sleep at night because your mind is troubled all the time - these days? Not at all        Social Connections    In a typical week, how many times do you talk on the phone with family, friends, or neighbors? Once a week     How often do you get together with friends or relatives? Once a week     How often do you attend Mu-ism or Confucianist services? More than 4 times per year     Do you belong to any clubs or organizations such as Mu-ism groups, unions, fraternal or athletic groups, or school groups? Patient refused     How often do you attend meetings of the clubs or organizations you belong to? Never     Are you , , , , never , or living with a partner?         Alcohol Use    Q1: How often do you have a drink containing alcohol? Never     Q2: How many drinks containing alcohol do you have on a typical day when you are drinking? Patient does not drink     Q3: How often do you have six or more drinks on one occasion? Never                   Patient express no D/C needs or concerns at this time.     Yolanda Soria LMSW 5/28/2023 10:56 AM

## 2023-05-28 NOTE — ASSESSMENT & PLAN NOTE
SOB CP   Troponin negative x1 and ekg showed no acute STT change    Pain resolved after emphysema Rx  Repeat troponin x1     05/28  Troponin x3 negative  F/u as OP with Dr. Rhodes

## 2023-05-28 NOTE — PLAN OF CARE
[facility] - Tele[facility] (Hospital)  Discharge Final Note    Primary Care Provider: Glenn Love MD    Expected Discharge Date: 5/28/2023    Final Discharge Note (most recent)       Final Note - 05/28/23 1535          Final Note    Assessment Type Final Discharge Note     Anticipated Discharge Disposition Home or Self Care        Post-Acute Status    Discharge Delays None known at this time                   No needs per chart review. KM,MSW    Important Message from Medicare             Contact Info       Juaquin Rhodes MD   Specialty: Interventional Cardiology, Cardiology    [address]  [place] [address]   Phone: [phone]       Next Steps: Follow up on 5/29/2023    Elgin Bae MD   Specialty: Critical Care Medicine, Pulmonary Disease    [address]  [place] [address]   Phone: [phone]       Next Steps: Follow up in 3 day(s)    Instructions: Hospital follow up for COPD exacerbation

## 2023-05-28 NOTE — HOSPITAL COURSE
Ms. Augustine is a 89 yo female admitted yesterday morning with chest pain and shortness of breathe.  Her troponin x 3 were negative and cardiology evaluated the patient and cleared for discharge.  She has a known his of COPD (no home o2) and appeared to be in exacerbation on admit.  She was given steroids and duonebs and was weaned off the O2.  Patient follows with Dr. Bae outpatient and recommended follow up upon discharge.

## 2023-05-28 NOTE — ASSESSMENT & PLAN NOTE
Patient with Permanent atrial fibrillation which is uncontrolled currently with Beta Blocker. Patient is currently in atrial fibrillation.[id number] Score: 3. Anticoagulation indicated. Anticoagulation done with Eliquis.

## 2023-05-28 NOTE — SUBJECTIVE & OBJECTIVE
ROS  Objective:     Vital Signs (Most Recent):  Temp: 99.1 °F (37.3 °C) (05/28/23 1217)  Pulse: 85 (05/28/23 1217)  Resp: 18 (05/28/23 1217)  BP: (!) 106/58 (05/28/23 1217)  SpO2: (!) 91 % (05/28/23 1217) Vital Signs (24h Range):  Temp:  [98 °F (36.7 °C)-99.1 °F (37.3 °C)] 99.1 °F (37.3 °C)  Pulse:  [71-96] 85  Resp:  [14-18] 18  SpO2:  [89 %-97 %] 91 %  BP: (102-127)/(55-66) 106/58     Weight: 60.2 kg (132 lb 11.5 oz)  Body mass index is 20.79 kg/m².     SpO2: (!) 91 %       No intake or output data in the 24 hours ending 05/28/23 1251    Lines/Drains/Airways       Peripheral Intravenous Line  Duration                  Peripheral IV - Single Lumen 05/27/23 20 G Right Forearm 1 day                       Physical Exam  HENT:      Head: Normocephalic.   Eyes:      Pupils: Pupils are equal, round, and reactive to light.   Neck:      Thyroid: No thyromegaly.      Vascular: Normal carotid pulses. No carotid bruit or JVD.   Cardiovascular:      Rate and Rhythm: Normal rate. Rhythm irregularly irregular. No extrasystoles are present.     Chest Wall: PMI is not displaced.      Pulses: Normal pulses.      Heart sounds: Normal heart sounds. No murmur heard.    No gallop. No S3 sounds.   Pulmonary:      Effort: No respiratory distress.      Breath sounds: No stridor. Decreased breath sounds present.   Abdominal:      General: Bowel sounds are normal.      Palpations: Abdomen is soft.      Tenderness: There is no abdominal tenderness. There is no rebound.   Musculoskeletal:         General: Normal range of motion.   Skin:     Findings: No rash.   Neurological:      Mental Status: She is alert and oriented to person, place, and time.   Psychiatric:         Behavior: Behavior normal.          Significant Labs: ABG: No results for input(s): PH, PCO2, HCO3, POCSATURATED, BE in the last 48 hours., Blood Culture: No results for input(s): LABBLOO in the last 48 hours., BMP:   Recent Labs   Lab 05/27/23  0210 05/27/23  0622   GLU  144* 138*    136   K 4.2 3.8   CL 97 97   CO2 27 27   BUN 21 20   CREATININE 0.7 0.8   CALCIUM 9.6 9.3   , CMP   Recent Labs   Lab 05/27/23  0210 05/27/23  0622    136   K 4.2 3.8   CL 97 97   CO2 27 27   * 138*   BUN 21 20   CREATININE 0.7 0.8   CALCIUM 9.6 9.3   PROT 7.0  --    ALBUMIN 3.9  --    BILITOT 1.2*  --    ALKPHOS 54*  --    AST 24  --    ALT 17  --    ANIONGAP 12 12   , CBC   Recent Labs   Lab 05/27/23  0210 05/27/23  0622   WBC 8.61 10.04   HGB 13.7 13.0   HCT 40.6 39.1   * 130*   , INR No results for input(s): INR, PROTIME in the last 48 hours., Lipid Panel No results for input(s): CHOL, HDL, LDLCALC, TRIG, CHOLHDL in the last 48 hours., and Troponin   Recent Labs   Lab 05/27/23  0210 05/28/23  0418 05/28/23  1034   TROPONINI 0.006 0.021 0.022       Significant Imaging: EKG:

## 2023-05-28 NOTE — ASSESSMENT & PLAN NOTE
Patient presented with acute onset dyspnea 7 elevated BNP measuring 302. Currently follows Dr. Rhodes from cardiology with last reported echo noted below. CXR negative for edema/effusions with physical exam negative for elevated JVD, crackles, or lower extremity edema. Patient s/p 20 mg IV lasix x1 in the ED.     Echo 5/16/23   The left ventricle is normal in size with concentric remodeling and normal systolic function.   Severe left atrial enlargement.   Indeterminate left ventricular diastolic function.   The estimated PA systolic pressure is 87 mmHg.   Moderate right ventricular enlargement with moderately reduced right ventricular systolic function.   There is pulmonary hypertension.   Elevated central venous pressure (15 mmHg).   The estimated ejection fraction is 60%.   There is abnormal septal wall motion. There is systolic flattening of the interventricular septum consistent with right ventricle pressure overload.   Severe right atrial enlargement.   Mild aortic regurgitation.   Moderate mitral regurgitation.   Moderate to severe tricuspid regurgitation.   Mild pulmonic regurgitation.    Plan:  -monitor I/O  -continue home medications  -f/u outpatient with cardiology - appt tomorrow scheduled

## 2023-05-28 NOTE — ASSESSMENT & PLAN NOTE
Patient with history of COPD/severe emphysema currently on inhalers but not home oxygen presented with acute onset dyspnea with signs/symptoms of distress and hypoxemia concerning for acute exacerbation. Patient currently saturating 100% on 4 L/min.  Chest x-ray positive for hyper inflated lungs without evidence of opacity/infectious processes noted.  D-dimer elevated measuring 0.51 per within normal limits according to aid adjustment.  BNP elevated measuring 302 without evidence of volume overload concerning for CHF exacerbation.  Patient flu/COVID negative.  Patient initiated on prednisone and DuoNebs.    Plan:  -Continue home medications  -on RA >88%  -Steroid dose pack  -Incentive spirometry   -Monitor pulse oximetry  -Duonebs prn

## 2023-05-28 NOTE — PROGRESS NOTES
[facility] - Telemetry ([facility])  Cardiology  Progress Note    Patient Name: Destini Augustine  MRN: [id number]  Admission Date: 5/27/2023  Hospital Length of Stay: 1 days  Code Status: Full Code   Attending Physician: Fortino Hull MD   Primary Care Physician: Glenn Love MD  Expected Discharge Date:   Principal Problem:<principal problem not specified>    Subjective:     Hospital Course:   89 yo female, cardiology consult for afib and chest pain  PMH permanent a fib, PHTN, TR, MR, aortic valve sclerosis. Copd/emphysema. F/u cardiologist Dr. Juaquin Rhodes  C/o chest pain and SOB for a day.   Echo in  EF 60% mod RV dysfunction, pulm HTN mod to severe TR, mod MR  Today ekg AFIB VR C  Cxr no acute issue, , Troponin negative, Cr 0.8 and HGb 13  Rx for copd exacerbation  Now AFIB VR C and chest pain free    05/28  AFIB VR controlled, SOB improved, mild chest tightness troponin negative x3.         ROS  Objective:     Vital Signs (Most Recent):  Temp: 99.1 °F (37.3 °C) (05/28/23 1217)  Pulse: 85 (05/28/23 1217)  Resp: 18 (05/28/23 1217)  BP: (!) 106/58 (05/28/23 1217)  SpO2: (!) 91 % (05/28/23 1217) Vital Signs (24h Range):  Temp:  [98 °F (36.7 °C)-99.1 °F (37.3 °C)] 99.1 °F (37.3 °C)  Pulse:  [71-96] 85  Resp:  [14-18] 18  SpO2:  [89 %-97 %] 91 %  BP: (102-127)/(55-66) 106/58     Weight: 60.2 kg (132 lb 11.5 oz)  Body mass index is 20.79 kg/m².     SpO2: (!) 91 %       No intake or output data in the 24 hours ending 05/28/23 1251    Lines/Drains/Airways       Peripheral Intravenous Line  Duration                  Peripheral IV - Single Lumen 05/27/23 20 G Right Forearm 1 day                       Physical Exam  HENT:      Head: Normocephalic.   Eyes:      Pupils: Pupils are equal, round, and reactive to light.   Neck:      Thyroid: No thyromegaly.      Vascular: Normal carotid pulses. No carotid bruit or JVD.   Cardiovascular:      Rate and Rhythm: Normal rate. Rhythm irregularly irregular. No extrasystoles  are present.     Chest Wall: PMI is not displaced.      Pulses: Normal pulses.      Heart sounds: Normal heart sounds. No murmur heard.    No gallop. No S3 sounds.   Pulmonary:      Effort: No respiratory distress.      Breath sounds: No stridor. Decreased breath sounds present.   Abdominal:      General: Bowel sounds are normal.      Palpations: Abdomen is soft.      Tenderness: There is no abdominal tenderness. There is no rebound.   Musculoskeletal:         General: Normal range of motion.   Skin:     Findings: No rash.   Neurological:      Mental Status: She is alert and oriented to person, place, and time.   Psychiatric:         Behavior: Behavior normal.          Significant Labs: ABG: No results for input(s): PH, PCO2, HCO3, POCSATURATED, BE in the last 48 hours., Blood Culture: No results for input(s): LABBLOO in the last 48 hours., BMP:   Recent Labs   Lab 05/27/23 0210 05/27/23 0622   * 138*    136   K 4.2 3.8   CL 97 97   CO2 27 27   BUN 21 20   CREATININE 0.7 0.8   CALCIUM 9.6 9.3   , CMP   Recent Labs   Lab 05/27/23 0210 05/27/23  0622    136   K 4.2 3.8   CL 97 97   CO2 27 27   * 138*   BUN 21 20   CREATININE 0.7 0.8   CALCIUM 9.6 9.3   PROT 7.0  --    ALBUMIN 3.9  --    BILITOT 1.2*  --    ALKPHOS 54*  --    AST 24  --    ALT 17  --    ANIONGAP 12 12   , CBC   Recent Labs   Lab 05/27/23 0210 05/27/23  0622   WBC 8.61 10.04   HGB 13.7 13.0   HCT 40.6 39.1   * 130*   , INR No results for input(s): INR, PROTIME in the last 48 hours., Lipid Panel No results for input(s): CHOL, HDL, LDLCALC, TRIG, CHOLHDL in the last 48 hours., and Troponin   Recent Labs   Lab 05/27/23  0210 05/28/23  0418 05/28/23  1034   TROPONINI 0.006 0.021 0.022       Significant Imaging: EKG:      Assessment and Plan:         Chest pain  SOB CP   Troponin negative x1 and ekg showed no acute STT change    Pain resolved after emphysema Rx  Repeat troponin x1     05/28  Troponin x3 negative  F/u as  OP with Dr. Rhodes    Pulmonary emphysema  Rx per HM    Atrial fibrillation, permanent  Now FIB VR controlled    Continue eliquis and BB    05/28  AFIB VR Controlled  Continue BB and eliquis        VTE Risk Mitigation (From admission, onward)         Ordered     apixaban tablet 2.5 mg  Daily         05/27/23 0730     IP VTE HIGH RISK PATIENT  Once         05/27/23 0544     Place sequential compression device  Until discontinued         05/27/23 0544                Jaylen Taylor MD  Cardiology  [facility] - Telemetry ([facility])

## 2023-05-28 NOTE — ASSESSMENT & PLAN NOTE
Now FIB VR controlled    Continue eliquis and BB    05/28  AFIB VR Controlled  Continue BB and eliquis

## 2023-05-29 ENCOUNTER — OFFICE VISIT (OUTPATIENT)
Dept: CARDIOLOGY | Facility: CLINIC | Age: 88
End: 2023-05-29
Payer: MEDICARE

## 2023-05-29 ENCOUNTER — TELEPHONE (OUTPATIENT)
Dept: PULMONOLOGY | Facility: CLINIC | Age: 88
End: 2023-05-29
Payer: MEDICARE

## 2023-05-29 VITALS
DIASTOLIC BLOOD PRESSURE: 82 MMHG | SYSTOLIC BLOOD PRESSURE: 120 MMHG | HEART RATE: 83 BPM | BODY MASS INDEX: 20.17 KG/M2 | OXYGEN SATURATION: 94 % | HEIGHT: 67 IN | WEIGHT: 128.5 LBS

## 2023-05-29 DIAGNOSIS — J43.2 CENTRILOBULAR EMPHYSEMA: ICD-10-CM

## 2023-05-29 DIAGNOSIS — I48.21 ATRIAL FIBRILLATION, PERMANENT: Primary | Chronic | ICD-10-CM

## 2023-05-29 DIAGNOSIS — I36.1 NONRHEUMATIC TRICUSPID VALVE REGURGITATION: ICD-10-CM

## 2023-05-29 DIAGNOSIS — R79.89 ELEVATED BRAIN NATRIURETIC PEPTIDE (BNP) LEVEL: ICD-10-CM

## 2023-05-29 DIAGNOSIS — J44.1 COPD EXACERBATION: ICD-10-CM

## 2023-05-29 DIAGNOSIS — R07.89 OTHER CHEST PAIN: ICD-10-CM

## 2023-05-29 DIAGNOSIS — I34.0 NONRHEUMATIC MITRAL VALVE REGURGITATION: ICD-10-CM

## 2023-05-29 DIAGNOSIS — R06.00 DYSPNEA, UNSPECIFIED TYPE: ICD-10-CM

## 2023-05-29 DIAGNOSIS — I27.20 PULMONARY HYPERTENSION: ICD-10-CM

## 2023-05-29 DIAGNOSIS — I50.32 CHRONIC DIASTOLIC HEART FAILURE: ICD-10-CM

## 2023-05-29 DIAGNOSIS — Z79.01 ANTICOAGULATED: ICD-10-CM

## 2023-05-29 DIAGNOSIS — I27.9 PULMONARY HEART DISEASE: ICD-10-CM

## 2023-05-29 DIAGNOSIS — R94.31 ABNORMAL ECG: ICD-10-CM

## 2023-05-29 PROCEDURE — 99214 PR OFFICE/OUTPT VISIT, EST, LEVL IV, 30-39 MIN: ICD-10-PCS | Mod: S$GLB,,, | Performed by: INTERNAL MEDICINE

## 2023-05-29 PROCEDURE — 99999 PR PBB SHADOW E&M-EST. PATIENT-LVL III: ICD-10-PCS | Mod: PBBFAC,,, | Performed by: INTERNAL MEDICINE

## 2023-05-29 PROCEDURE — 1111F PR DISCHARGE MEDS RECONCILED W/ CURRENT OUTPATIENT MED LIST: ICD-10-PCS | Mod: CPTII,S$GLB,, | Performed by: INTERNAL MEDICINE

## 2023-05-29 PROCEDURE — 99999 PR PBB SHADOW E&M-EST. PATIENT-LVL III: CPT | Mod: PBBFAC,,, | Performed by: INTERNAL MEDICINE

## 2023-05-29 PROCEDURE — 1111F DSCHRG MED/CURRENT MED MERGE: CPT | Mod: CPTII,S$GLB,, | Performed by: INTERNAL MEDICINE

## 2023-05-29 PROCEDURE — 1126F AMNT PAIN NOTED NONE PRSNT: CPT | Mod: CPTII,S$GLB,, | Performed by: INTERNAL MEDICINE

## 2023-05-29 PROCEDURE — 1126F PR PAIN SEVERITY QUANTIFIED, NO PAIN PRESENT: ICD-10-PCS | Mod: CPTII,S$GLB,, | Performed by: INTERNAL MEDICINE

## 2023-05-29 PROCEDURE — 99214 OFFICE O/P EST MOD 30 MIN: CPT | Mod: S$GLB,,, | Performed by: INTERNAL MEDICINE

## 2023-05-29 NOTE — TELEPHONE ENCOUNTER
Spoke with pt. Pt decided to keep scheduled appt with Dr. Atkins on 06/13/23 soonest available between both providers.

## 2023-05-29 NOTE — PROGRESS NOTES
Subjective:    Patient ID:  Destini Augustine is a 89 y.o. female who presents for evaluation of Atrial Fibrillation, Congestive Heart Failure, Valvular Heart Disease, Hyperlipidemia, Hypertension, and Chest Pain      HPI: Pt presents for eval.  Her current med conditions include permanent a fib, PHTN, AI, DD, RV failure, COPD, TR, MR, aortic valve sclerosis.  Nonsmoker.  Past hx pertinent for following:  F/u by Dr Gibson, Cardiology, who retired.  Echo 2016 normal EF, aortic valve sclerosis, mild TR, mild-mod TR, PAP 46 mmHg.  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  ecg May 2022 a fib w controlled VR, left axis, chronic septal infarct.  Now here.  Pt admitted over weekend with CP, COPD exacerbation.  Ecg showed no acute ischemia.  -  troponin levels.  BNP was elevated, 302 (no old to compare).  Tx for COPD exacerbation and improved.  On steroid pack.  Dr. Taylor, Cards, evaluated pt.  Echo May 2023:  normal LVEF, DD, moderate RVE with moderate RV dysfunction, LAE, mod MR, mild AI, mod-severe TR, PAP 87 mmHg.  She is improved.  F/u with Dr. Andre Atkins, Pulmonary.  CP has resolved.        Past Medical History:   Diagnosis Date    Arthritis     knees, hands    Atrial fibrillation     Basal cell carcinoma     face, right thigh    Chronic diastolic heart failure 5/29/2023    COAG (chronic open-angle glaucoma) - Both Eyes 8/28/2013    COPD exacerbation 5/27/2023    Ex-smoker     [place]     mandible    Macular degeneration     Osteopenia     7/14 shama 7/16    Pulmonary heart disease     Scapholunate ligament injury with no instability     Scoliosis 11/30/15    lumbar x-ray     Current Outpatient Medications   Medication Instructions    alendronate (FOSAMAX) 70 MG tablet TAKE 1 TAB WEEKLY IN THE MORNING WITH FULL GLASS OF WATER ON EMPTY STOMACH.DO NOT LIE DOWN FOR AT LEAST 30 MINUTES AFTER    apixaban (ELIQUIS) 2.5 mg, Oral, 2 times daily    latanoprost 0.005 % ophthalmic solution  "INSTILL 1 DROP INTO BOTH EYES EVERY NIGHT    methylPREDNISolone (MEDROL DOSEPACK) 4 mg tablet use as directed    metoprolol succinate (TOPROL-XL) 25 mg, Oral, Daily    multivitamin (THERAGRAN) per tablet 1 tablet, Oral, Daily    timolol maleate 0.5% (TIMOPTIC) 0.5 % Drop 1 drop, Right Eye, Every morning    turmeric root extract 500 mg, Oral, Daily         Review of Systems   Constitutional: Negative.   HENT: Negative.     Eyes: Negative.    Cardiovascular:  Positive for chest pain and dyspnea on exertion.   Respiratory:  Positive for shortness of breath.    Endocrine: Negative.    Hematologic/Lymphatic: Negative.    Skin: Negative.    Musculoskeletal:  Positive for arthritis.   Gastrointestinal: Negative.    Genitourinary: Negative.    Neurological:  Positive for weakness.   Psychiatric/Behavioral: Negative.     Allergic/Immunologic: Negative.      /82 (BP Location: Right arm, Patient Position: Sitting, BP Method: Large (Manual))   Pulse 83   Ht 5' 7" (1.702 m)   Wt 58.3 kg (128 lb 8.5 oz)   SpO2 (!) 94%   BMI 20.13 kg/m²     Wt Readings from Last 3 Encounters:   05/29/23 58.3 kg (128 lb 8.5 oz)   05/27/23 60.2 kg (132 lb 11.5 oz)   05/16/23 59 kg (130 lb)     Temp Readings from Last 3 Encounters:   05/28/23 99.1 °F (37.3 °C) (Oral)   05/15/23 98.6 °F (37 °C) (Tympanic)   12/19/22 96 °F (35.6 °C) (Tympanic)     BP Readings from Last 3 Encounters:   05/29/23 120/82   05/28/23 (!) 106/58   05/16/23 114/70     Pulse Readings from Last 3 Encounters:   05/29/23 83   05/28/23 85   05/15/23 92          Objective:    Physical Exam  Vitals and nursing note reviewed.   Constitutional:       General: She is not in acute distress.     Appearance: Normal appearance. She is well-developed. She is not ill-appearing or diaphoretic.   HENT:      Head: Normocephalic.   Neck:      Thyroid: No thyromegaly.      Vascular: No carotid bruit or JVD.   Cardiovascular:      Rate and Rhythm: Normal rate. Rhythm irregularly " irregular.      Pulses: Normal pulses.           Radial pulses are 2+ on the right side and 2+ on the left side.      Heart sounds: Normal heart sounds, S1 normal and S2 normal. No murmur heard.    No friction rub. No gallop.   Pulmonary:      Effort: Pulmonary effort is normal.      Breath sounds: Normal breath sounds. Decreased air movement present. No wheezing or rales.   Abdominal:      General: Bowel sounds are normal. There is no abdominal bruit.      Palpations: Abdomen is soft.      Tenderness: There is no abdominal tenderness.   Musculoskeletal:      Cervical back: Neck supple.   Lymphadenopathy:      Cervical: No cervical adenopathy.   Skin:     General: Skin is warm.   Neurological:      Mental Status: She is alert and oriented to person, place, and time.   Psychiatric:         Behavior: Behavior normal. Behavior is cooperative.     I have reviewed all pertinent labs and cardiac studies.  Component      Latest Ref Rng & Units 5/27/2023   BNP      0 - 99 pg/mL 302 (H)       Chemistry        Component Value Date/Time     05/27/2023 0622    K 3.8 05/27/2023 0622    CL 97 05/27/2023 0622    CO2 27 05/27/2023 0622    BUN 20 05/27/2023 0622    CREATININE 0.8 05/27/2023 0622     (H) 05/27/2023 0622        Component Value Date/Time    CALCIUM 9.3 05/27/2023 0622    ALKPHOS 54 (L) 05/27/2023 0210    AST 24 05/27/2023 0210    ALT 17 05/27/2023 0210    BILITOT 1.2 (H) 05/27/2023 0210    ESTGFRAFRICA >60.0 08/12/2021 0956    EGFRNONAA >60.0 08/12/2021 0956        Lab Results   Component Value Date    WBC 10.04 05/27/2023    HGB 13.0 05/27/2023    HCT 39.1 05/27/2023    MCV 87 05/27/2023     (L) 05/27/2023       Lab Results   Component Value Date    TSH 1.055 10/05/2017     No results found for: LABA1C, HGBA1C  Lab Results   Component Value Date    CHOL 140 08/12/2021    CHOL 149 10/05/2017    CHOL 148 08/17/2016     Lab Results   Component Value Date    HDL 66 08/12/2021    HDL 68 10/05/2017     HDL 65 08/17/2016     Lab Results   Component Value Date    LDLCALC 61.2 (L) 08/12/2021    LDLCALC 68.4 10/05/2017    LDLCALC 71.0 08/17/2016     Lab Results   Component Value Date    TRIG 64 08/12/2021    TRIG 63 10/05/2017    TRIG 60 08/17/2016     Lab Results   Component Value Date    CHOLHDL 47.1 08/12/2021    CHOLHDL 45.6 10/05/2017    CHOLHDL 43.9 08/17/2016         Results for orders placed during the hospital encounter of 05/16/23    Echo    Interpretation Summary  · The left ventricle is normal in size with concentric remodeling and normal systolic function.  · Severe left atrial enlargement.  · Indeterminate left ventricular diastolic function.  · The estimated PA systolic pressure is 87 mmHg.  · Moderate right ventricular enlargement with moderately reduced right ventricular systolic function.  · There is pulmonary hypertension.  · Elevated central venous pressure (15 mmHg).  · The estimated ejection fraction is 60%.  · There is abnormal septal wall motion. There is systolic flattening of the interventricular septum consistent with right ventricle pressure overload.  · Severe right atrial enlargement.  · Mild aortic regurgitation.  · Moderate mitral regurgitation.  · Moderate to severe tricuspid regurgitation.  · Mild pulmonic regurgitation.        Assessment:       1. Atrial fibrillation, permanent    2. Nonrheumatic mitral valve regurgitation    3. Elevated brain natriuretic peptide (BNP) level    4. Other chest pain    5. Nonrheumatic tricuspid valve regurgitation    6. Abnormal ECG    7. Pulmonary heart disease    8. COPD exacerbation    9. Dyspnea, unspecified type    10. Centrilobular emphysema    11. Anticoagulated    12. Pulmonary hypertension    13. Chronic diastolic heart failure         Plan:               Reviewed hospital findings with pt and echo.  Seems to be much improved.  Continue with current therapy.  Diastolic CHF: Compensated.  Low salt diet.   Reassess BNP next appt to compare  values.  Add diuretic if needed in future but she appears euvolemic on exam today.  COPD: F/u w Pulmonary as scheduled next month.  RV failure: due  to severe COPD.  Permanent a fib: rate control strategy. Continue Eliquis bid.  PHTN: due to COPD, diastolic CHF.  F/u w Pulmonary.  F/u echo in future.  Valvular heart conditions: monitor. F/u echo in future.  Reviewed all tests and above medical conditions with patient in detail and formulated treatment plan.  Continue optimal medical treatment for cardiovascular conditions.  Cardiac low salt diet advised.  Daily exercise as tolerated.  Maintaining healthy weight and weight loss goals (if needed) were discussed in clinic.  Need for BP control and HTN goals (if needed) were discussed and tx plan formulated.  Importance of optimal lipid control were discussed in detail as well as possible pharmacologic and lifestyle changes that may be needed.  Abnl ecg: chronic abnormalities. monitor.  F/u in 4 months w CMP + BNP.      I have reviewed all pertinent labs and cardiac studies independently. Plans and recommendations have been formulated under my direct supervision. All questions answered and patient voiced understanding.

## 2023-05-29 NOTE — TELEPHONE ENCOUNTER
----- Message from Ilana Thompson sent at 5/29/2023  2:35 PM CDT -----  Please call pt abut scheduling an hospital follow up with Dr. Pulido for this week or next week. Pt phone number is on chart. Thanks

## 2023-05-30 ENCOUNTER — PATIENT OUTREACH (OUTPATIENT)
Dept: ADMINISTRATIVE | Facility: CLINIC | Age: 88
End: 2023-05-30
Payer: MEDICARE

## 2023-05-30 NOTE — PROGRESS NOTES
C3 nurse spoke with Destini Augustine  for a TCC post hospital discharge follow up call. The patient does not have a scheduled HOSFU appointment with Glenn Love MD  within 5-7 days post hospital discharge date 05/28/2023. C3 nurse was unable to schedule HOSFU appointment in [facility].    Please contact patient and schedule follow up appointment using HOSFU visit type on or before 06/05/2023.    Decline NP scheduling

## 2023-06-01 ENCOUNTER — TELEPHONE (OUTPATIENT)
Dept: OPHTHALMOLOGY | Facility: CLINIC | Age: 88
End: 2023-06-01
Payer: MEDICARE

## 2023-06-01 NOTE — TELEPHONE ENCOUNTER
----- Message from Fifi Flores sent at 6/1/2023 11:11 AM CDT -----  Contact: Destini Moise is calling to speak with a nurse regarding appt . Reports needing to cancel appt and would like to reschedule . Please give patient a call back at [phone] .

## 2023-06-06 ENCOUNTER — PATIENT MESSAGE (OUTPATIENT)
Dept: INTERNAL MEDICINE | Facility: CLINIC | Age: 88
End: 2023-06-06
Payer: MEDICARE

## 2023-06-13 ENCOUNTER — OFFICE VISIT (OUTPATIENT)
Dept: PULMONOLOGY | Facility: CLINIC | Age: 88
End: 2023-06-13
Payer: MEDICARE

## 2023-06-13 VITALS
HEIGHT: 67 IN | OXYGEN SATURATION: 95 % | DIASTOLIC BLOOD PRESSURE: 82 MMHG | HEART RATE: 100 BPM | BODY MASS INDEX: 20.17 KG/M2 | RESPIRATION RATE: 22 BRPM | SYSTOLIC BLOOD PRESSURE: 142 MMHG | WEIGHT: 128.5 LBS

## 2023-06-13 DIAGNOSIS — D38.1 NEOPLASM OF UNCERTAIN BEHAVIOR OF HILUS OF LUNG: Primary | ICD-10-CM

## 2023-06-13 DIAGNOSIS — J43.2 CENTRILOBULAR EMPHYSEMA: ICD-10-CM

## 2023-06-13 DIAGNOSIS — D38.1 NEOPLASM OF UNCERTAIN BEHAVIOR OF RIGHT UPPER LOBE OF LUNG: ICD-10-CM

## 2023-06-13 PROCEDURE — 1160F RVW MEDS BY RX/DR IN RCRD: CPT | Mod: CPTII,S$GLB,, | Performed by: INTERNAL MEDICINE

## 2023-06-13 PROCEDURE — 99214 OFFICE O/P EST MOD 30 MIN: CPT | Mod: S$GLB,,, | Performed by: INTERNAL MEDICINE

## 2023-06-13 PROCEDURE — 99999 PR PBB SHADOW E&M-EST. PATIENT-LVL IV: ICD-10-PCS | Mod: PBBFAC,,, | Performed by: INTERNAL MEDICINE

## 2023-06-13 PROCEDURE — 1111F DSCHRG MED/CURRENT MED MERGE: CPT | Mod: CPTII,S$GLB,, | Performed by: INTERNAL MEDICINE

## 2023-06-13 PROCEDURE — 1126F PR PAIN SEVERITY QUANTIFIED, NO PAIN PRESENT: ICD-10-PCS | Mod: CPTII,S$GLB,, | Performed by: INTERNAL MEDICINE

## 2023-06-13 PROCEDURE — 1101F PT FALLS ASSESS-DOCD LE1/YR: CPT | Mod: CPTII,S$GLB,, | Performed by: INTERNAL MEDICINE

## 2023-06-13 PROCEDURE — 1159F PR MEDICATION LIST DOCUMENTED IN MEDICAL RECORD: ICD-10-PCS | Mod: CPTII,S$GLB,, | Performed by: INTERNAL MEDICINE

## 2023-06-13 PROCEDURE — 3288F FALL RISK ASSESSMENT DOCD: CPT | Mod: CPTII,S$GLB,, | Performed by: INTERNAL MEDICINE

## 2023-06-13 PROCEDURE — 1126F AMNT PAIN NOTED NONE PRSNT: CPT | Mod: CPTII,S$GLB,, | Performed by: INTERNAL MEDICINE

## 2023-06-13 PROCEDURE — 99999 PR PBB SHADOW E&M-EST. PATIENT-LVL IV: CPT | Mod: PBBFAC,,, | Performed by: INTERNAL MEDICINE

## 2023-06-13 PROCEDURE — 1160F PR REVIEW ALL MEDS BY PRESCRIBER/CLIN PHARMACIST DOCUMENTED: ICD-10-PCS | Mod: CPTII,S$GLB,, | Performed by: INTERNAL MEDICINE

## 2023-06-13 PROCEDURE — 1101F PR PT FALLS ASSESS DOC 0-1 FALLS W/OUT INJ PAST YR: ICD-10-PCS | Mod: CPTII,S$GLB,, | Performed by: INTERNAL MEDICINE

## 2023-06-13 PROCEDURE — 1111F PR DISCHARGE MEDS RECONCILED W/ CURRENT OUTPATIENT MED LIST: ICD-10-PCS | Mod: CPTII,S$GLB,, | Performed by: INTERNAL MEDICINE

## 2023-06-13 PROCEDURE — 3288F PR FALLS RISK ASSESSMENT DOCUMENTED: ICD-10-PCS | Mod: CPTII,S$GLB,, | Performed by: INTERNAL MEDICINE

## 2023-06-13 PROCEDURE — 1159F MED LIST DOCD IN RCRD: CPT | Mod: CPTII,S$GLB,, | Performed by: INTERNAL MEDICINE

## 2023-06-13 PROCEDURE — 99214 PR OFFICE/OUTPT VISIT, EST, LEVL IV, 30-39 MIN: ICD-10-PCS | Mod: S$GLB,,, | Performed by: INTERNAL MEDICINE

## 2023-06-13 RX ORDER — TIOTROPIUM BROMIDE AND OLODATEROL 3.124; 2.736 UG/1; UG/1
1 SPRAY, METERED RESPIRATORY (INHALATION) DAILY
Qty: 1 G | Refills: 5 | Status: SHIPPED | OUTPATIENT
Start: 2023-06-13

## 2023-06-13 NOTE — PROGRESS NOTES
Subjective:      Patient ID: Destini Augustine is a 89 y.o. female.    Chief Complaint: Pulmonary Nodules    Pulmonary Nodules      Seen by me in March for the followin year-old female with a past history of osteoporosis, former cigarette smoker quit many years ago who had a fall recently with head laceration which required sutures.  As consequent to that injury, she had a CT of the head which was unremarkable.  She had a CT of the cervical spine which contained upper cuts of the lungs which were interpreted as biapical scarring versus nodules as well as emphysema.  She is here today for that reason.  She has no pulmonary complaints.  She specifically denies any dyspnea on exertion, cough, wheezing or chest pain.    2023:    In the interim the patient was admitted in the hospital in May with dyspnea and was diagnosed with a COPD exacerbation.  She was treated for such with antibiotics and steroid taper.  She states that she is back to her baseline.  She does get breathless on exertion but this has been for few years.  She denies any ongoing wheezing, cough, fever, chills or chest pain.  She did have a 1 cm suspicious right upper lobe nodule at our prior visit and has not had follow up imaging as of yet.    Review of Systems as per HPI otherwise negative    Objective:     Physical Exam   Constitutional: She is oriented to person, place, and time. She appears well-developed. No distress.   HENT:   Head: Normocephalic.   Neck: JVD present.   Cardiovascular: Normal rate and regular rhythm.   Pulmonary/Chest: Normal expansion, symmetric chest wall expansion and effort normal. She has no wheezes.   Musculoskeletal:         General: No edema.      Cervical back: Neck supple.   Neurological: She is alert and oriented to person, place, and time.   Nursing note and vitals reviewed.       Assessment:     1. 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 Centrilobular emphysema         Orders Placed This Encounter   Procedures    NM PET CT Routine Skull to Mid Thigh     Standing Status:   Future     Standing Expiration Date:   6/13/2024     Order Specific Question:   May the Radiologist modify the order per protocol to meet the clinical needs of the patient?     Answer:   Yes     All of the above diagnoses affected clinical decision-making at this visit    Plan:     Right upper lobe lung nodule at least moderately suspicious for malignancy noted on prior imaging.  At this point we will get a PET-CT.  Should this lesion be larger than previous and exhibit PET avidity that would represent de facto evidence of malignancy and likely would request empiric SBRT to that lesion.  Because of the apical location and relatively small size results would be expected to be similar to surgical resection.  Regarding her possible COPD and known emphysema, we discussed an empiric trial of combination bronchodilator treatment.  I have sent a prescription for Stiolto to the pharmacy and she is to try this once daily between now and our next visit.  At next visit we can gauge her response to this treatment regimen.  I discussed the above in detail with the patient answered her questions to her apparent satisfaction.  She voiced understanding and agreement.  I will see her back in 3 months, sooner if needed.  I will notify her PET results and any actions required based on those results once available.

## 2023-06-19 ENCOUNTER — HOSPITAL ENCOUNTER (OUTPATIENT)
Dept: RADIOLOGY | Facility: HOSPITAL | Age: 88
Discharge: HOME | End: 2023-06-19
Attending: INTERNAL MEDICINE
Payer: MEDICARE

## 2023-06-19 DIAGNOSIS — D38.1 NEOPLASM OF UNCERTAIN BEHAVIOR OF RIGHT UPPER LOBE OF LUNG: ICD-10-CM

## 2023-06-19 PROCEDURE — 78815 PET IMAGE W/CT SKULL-THIGH: CPT | Mod: 26,PI,, | Performed by: RADIOLOGY

## 2023-06-19 PROCEDURE — A9552 F18 FDG: HCPCS

## 2023-06-19 PROCEDURE — 78815 NM PET CT ROUTINE: ICD-10-PCS | Mod: 26,PI,, | Performed by: RADIOLOGY

## 2023-06-20 NOTE — PROGRESS NOTES
Called patient with results. Lung nodule decreasing in size and is PET negative. No need for additional imaging. Pt states innhaler is working very well. Will see back PRN. Pt voiced understanding and agreement.

## 2023-06-25 DIAGNOSIS — Z78.0 ASYMPTOMATIC POSTMENOPAUSAL STATUS: Primary | ICD-10-CM

## 2023-06-26 RX ORDER — ALENDRONATE SODIUM 70 MG/1
TABLET ORAL
Qty: 12 TABLET | Refills: 1 | Status: SHIPPED | OUTPATIENT
Start: 2023-06-26 | End: 2023-07-16

## 2023-06-26 NOTE — TELEPHONE ENCOUNTER
Refill Routing Note   Medication(s) are not appropriate for processing by [facility] for the following reason(s):      Medication outside of protocol    [facility] action(s):  Route None identified          Appointments  past 12m or future 3m with PCP    Date Provider   Last Visit   2/14/2022 Glenn Love MD   Next Visit   8/21/2023 Glenn Love MD   ED visits in past 90 days: 0        Note composed:8:56 AM 06/26/2023

## 2023-06-29 ENCOUNTER — APPOINTMENT (OUTPATIENT)
Dept: RADIOLOGY | Facility: HOSPITAL | Age: 88
End: 2023-06-29
Attending: FAMILY MEDICINE
Payer: MEDICARE

## 2023-06-29 DIAGNOSIS — Z78.0 ASYMPTOMATIC POSTMENOPAUSAL STATUS: ICD-10-CM

## 2023-06-29 PROCEDURE — 77080 DXA BONE DENSITY AXIAL: CPT | Mod: 26,,, | Performed by: RADIOLOGY

## 2023-06-29 PROCEDURE — 77080 DXA BONE DENSITY AXIAL SKELETON 1 OR MORE SITES: ICD-10-PCS | Mod: 26,,, | Performed by: RADIOLOGY

## 2023-06-29 PROCEDURE — 77080 DXA BONE DENSITY AXIAL: CPT | Mod: TC

## 2023-07-03 ENCOUNTER — OFFICE VISIT (OUTPATIENT)
Dept: OPHTHALMOLOGY | Facility: CLINIC | Age: 88
End: 2023-07-03
Payer: MEDICARE

## 2023-07-03 DIAGNOSIS — H40.1132 PRIMARY OPEN ANGLE GLAUCOMA OF BOTH EYES, MODERATE STAGE: Primary | ICD-10-CM

## 2023-07-03 DIAGNOSIS — H04.129 DRY EYE: ICD-10-CM

## 2023-07-03 DIAGNOSIS — Z96.1 PSEUDOPHAKIA OF BOTH EYES: ICD-10-CM

## 2023-07-03 PROCEDURE — 1159F PR MEDICATION LIST DOCUMENTED IN MEDICAL RECORD: ICD-10-PCS | Mod: CPTII,S$GLB,, | Performed by: OPHTHALMOLOGY

## 2023-07-03 PROCEDURE — 1159F MED LIST DOCD IN RCRD: CPT | Mod: CPTII,S$GLB,, | Performed by: OPHTHALMOLOGY

## 2023-07-03 PROCEDURE — 1160F PR REVIEW ALL MEDS BY PRESCRIBER/CLIN PHARMACIST DOCUMENTED: ICD-10-PCS | Mod: CPTII,S$GLB,, | Performed by: OPHTHALMOLOGY

## 2023-07-03 PROCEDURE — 99999 PR PBB SHADOW E&M-EST. PATIENT-LVL II: CPT | Mod: PBBFAC,,, | Performed by: OPHTHALMOLOGY

## 2023-07-03 PROCEDURE — 99999 PR PBB SHADOW E&M-EST. PATIENT-LVL II: ICD-10-PCS | Mod: PBBFAC,,, | Performed by: OPHTHALMOLOGY

## 2023-07-03 PROCEDURE — 99214 PR OFFICE/OUTPT VISIT, EST, LEVL IV, 30-39 MIN: ICD-10-PCS | Mod: S$GLB,,, | Performed by: OPHTHALMOLOGY

## 2023-07-03 PROCEDURE — 1160F RVW MEDS BY RX/DR IN RCRD: CPT | Mod: CPTII,S$GLB,, | Performed by: OPHTHALMOLOGY

## 2023-07-03 PROCEDURE — 99214 OFFICE O/P EST MOD 30 MIN: CPT | Mod: S$GLB,,, | Performed by: OPHTHALMOLOGY

## 2023-07-03 NOTE — PROGRESS NOTES
SUBJECTIVE  Destini Augustine is 89 y.o. female  Uncorrected distance visual acuity was 20/25 in the right eye and 20/30 -2 in the left eye.   Chief Complaint   Patient presents with    Glaucoma     Pt reports for 4m IOP check. Denies any pain or irritation. Va stable. 100% compliant with gtts.            HPI     Glaucoma     Additional comments: Pt reports for 4m IOP check. Denies any pain or   irritation. Va stable. 100% compliant with gtts.             Comments    1. Mod COAG OD>OS (Initial 30/26) goal = 17   2. PCIOL OD w/ ECP & Toric IOL 5/20/09   PCIOL OS w/ ECP & Toric IOL 4/22/09   3. Mild Dry AMD   4. PCO OS   5. Pseudophakia OU      Latanoprost QHS OU   Timolol BID OD     Ocuvite   HAG (does not do)   O3FO  WC/LS            Last edited by Benjie Daniel on 7/3/2023  1:02 PM.         Assessment /Plan :  1. Primary open angle glaucoma of both eyes, moderate stage Doing well, intraocular pressure (IOP) within acceptable range relative to target IOP and no evidence of progression. Continue current treatment. Reviewed importance of continued compliance with treatment and follow up.      Patient instructed to continue using the following glaucoma medication as follows:  Latanoprost one drop in each eye nightly and Timolol one drop in right eye every 12 hours    Return to clinic in 4 months  or as needed.  With IOP Check and GOCT     2. Pseudophakia of both eyes    3. Dry eye

## 2023-07-24 NOTE — PLAN OF CARE
[facility]  Physical Therapy Initial Evaluation    Name: Destini Augustine  Clinic Number: [id number]    Therapy Diagnosis:   Encounter Diagnosis   Name Primary?    Acute pain of right knee      Physician: Estella Thompson NP    Physician Orders: PT Eval and Treat   Medical Diagnosis from Referral: M25.561,M25.461 (ICD-10-CM) - Pain and swelling of right knee M17.11 (ICD-10-CM) - Primary osteoarthritis of right knee     Evaluation Date: 11/22/2019  Authorization Period Expiration: 11/19/2020  Plan of Care Expiration: 12/27/2019  Visit # / Visits authorized: 1/ 1    Time In: 14:33  Time Out: 15:20  Total Billable Time: 30 minutes    Precautions: Standard and A-fib, and osteopenia    Subjective   Date of onset: 11/19/19  History of current condition - Destini reports: that she has had problems with her R knee for over 20 years.  But this Tuesday she was in a workout class and she went to sit down and her knee pain increased and it has been bad ever since.  She had xrays which showed osteoarthritis in the R knee.  Putting on shoes and socks, getting into bed, and turning over in bed are difficult.  She has difficulty with walking on the RLE. She is not doing her work out classes currently, silver sneakers.  She also noted that when she would be on a stationary bike she would need to wait a minute when she stood up because it didn't feel like the knee was quite in the socket.  She does state that it's better now than on Tuesday.      Medical History:   Past Medical History:   Diagnosis Date    Arthritis     knees, hands    Atrial fibrillation     Basal cell carcinoma     face, right thigh    COAG (chronic open-angle glaucoma) - Both Eyes 8/28/2013    Ex-smoker     Hamartoma     mandible    Macular degeneration     Osteopenia     7/14 shama 7/16    Scoliosis 11/30/15    lumbar x-ray       Surgical History:   Destini Augustine  has a past surgical history that includes Cataract extraction  Surgery rescheduled-Dr. Perez reviewed testing results and does not require any repeat  testing. Spoke to Dr. Cox , does not need any repeat testing for surgery on 7/25.   "(Bilateral); rectocele and cystocele (1998 approx); TONSILLECTOMY, ADENOIDECTOMY (7 y/o); Appendectomy (1970s); hysterectomy (1970s); and Hysterectomy.    Medications:   Destini has a current medication list which includes the following prescription(s): alendronate, apixaban, biotin, calcium carbonate/vitamin d3, docosahexanoic acid/epa, glucosamine hcl/chondroitin portillo, latanoprost, metoprolol succinate, multivitamin, multivitamin, omega 3-dha-epa-fish oil, timolol maleate 0.5%, turmeric root extract, and vitamin a-vitamin c-vit e-min.    Allergies:   Review of patient's allergies indicates:   Allergen Reactions    Shellfish containing products Diarrhea and Nausea And Vomiting    Venom-wasp Itching and Swelling        Imaging,   X-ray  FINDINGS:  Bilateral degenerative findings present, progressed within the right greater left lateral compartments consisting of marginal osteophytes and joint space loss.  Right suprapatellar joint effusion suspected.  Right greater than left lateral patellofemoral compartment prominent osteophytes noted.       Prior Therapy: yes 20 years ago  Social History:  lives alone  Occupation: Retired  Prior Level of Function: WNL  Current Level of Function: Unable to work out, walking and ADLs are limited    Pain:  Current 3/10, worst 9/10, best 0/10   Location: right knee   Description: Sharp  Aggravating Factors: Flexing  Easing Factors: not bending it    Pts goals: "I would like to get back to my silver sneaAnzu class."    Objective     Gait Observation: Pt walks with limited R knee flexion and has antalgic gait pattern, has increased hip adduction bilaterally.  She will walk with a stiff R knee and needs cuing to bend her R knee as she walks and will improve after cues    Standing Posture: Pt tends to have slight forward lean at her trunk and knee adduction    AROM:  L knee flexion 124, ext 0  R knee flexion 110, ext 2 (lacks full knee extension by 2 degrees)    MMT:  L knee 4+/5 " grossly  R knee flexion 3+/5, extension 3+/5  R hip flexion 4/5, abd 3+/5, add 3+/5, extension 3/5  R ankle: DF 4-/5, IN 4-/5, EV 4-/5, PF 3+/5    SLS: R unable to do, L 3 sec    NBOS for 20 seconds        CMS Impairment/Limitation/Restriction for FOTO knee Survey    Therapist reviewed FOTO scores for Destini Augustine on 11/22/2019.   FOTO documents entered into [employer] - see Media section.    Limitation Score: 57%  Category: Body Position    Current : CK = at least 40% but < 60% impaired, limited or restricted  Goal: CJ = at least 20% but < 40% impaired, limited or restricted           TREATMENT   Treatment Time In: 14:50  Treatment Time Out: 15:20  Total Treatment time separate from Evaluation: 30 minutes    Destini received therapeutic exercises to develop strength, endurance, ROM and flexibility for 25 minutes including:  Nustep 5 min for increased ROM  Heel slides 5 sec hold x10  Quad sets 5 sec hold x 10  SAQ 5 sec hold x 10  Bridging 5 sec hold x 10  Sidelying hip abd 10x    Destini received the following manual therapy techniques: Joint mobilizations and Soft tissue Mobilization were applied to the: knees for 0 minutes, including:      Destini participated in neuromuscular re-education activities to improve: Balance and Posture for 0 minutes. The following activities were included:    Destini participated in gait training to improve functional mobility and safety for 5  minutes, including:  Increase knee flexion during ambulation    Destini received hot pack for 0 minutes to knees.    Destini received cold pack for 0 minutes to knees.    Home Exercises and Patient Education Provided    Education provided:   - HEP and plan of care    Written Home Exercises Provided: yes.  Exercises were reviewed and Destini was able to demonstrate them prior to the end of the session.  Destini demonstrated good  understanding of the education provided.     See EMR under Patient Instructions for exercises provided 11/22/2019.    Assessment   Destini  is a 86 y.o. female referred to outpatient Physical Therapy with a medical diagnosis of R knee OA. Pt presents with decreased ROM in the R knee, decreased strength in the R hip/knee/ankle, difficulty with gait, difficulty with walking, and difficulty with ADLs.      Pt prognosis is Good.   Pt will benefit from skilled outpatient Physical Therapy to address the deficits stated above and in the chart below, provide pt/family education, and to maximize pt's level of independence.     Plan of care discussed with patient: Yes  Pt's spiritual, cultural and educational needs considered and patient is agreeable to the plan of care and goals as stated below:     Anticipated Barriers for therapy: none    Medical Necessity is demonstrated by the following  History  Co-morbidities and personal factors that may impact the plan of care Co-morbidities:   pulmonary heart disease, osteopenia, glaucoma    Personal Factors:   age     moderate   Examination  Body Structures and Functions, activity limitations and participation restrictions that may impact the plan of care Body Regions:   lower extremities    Body Systems:    ROM  strength  balance  gait    Participation Restrictions:   none    Activity limitations:   Learning and applying knowledge  no deficits    General Tasks and Commands  no deficits    Communication  no deficits    Mobility  walking    Self care  dressing    Domestic Life  doing house work (cleaning house, washing dishes, laundry)    Interactions/Relationships  no deficits    Life Areas  no deficits    Community and Social Life  no deficits         moderate   Clinical Presentation stable and uncomplicated low   Decision Making/ Complexity Score: low     Goals:  Long Term Goals: 4 weeks   1. Pt will be 100% independent with HEP.  2. Pt will increase strength in her RLE to at least 4/5 at each joint.  3. Pt will be able to walk with a normalized gait pattern.  4. Pt will be able to perform SLS for at least 5 seconds  on each leg to improve her balance    Plan   Plan of care Certification: 11/22/2019 to 12/27/2019.    Outpatient Physical Therapy 2 times weekly for 4 weeks to include the following interventions: Gait Training, Manual Therapy, Moist Heat/ Ice, Neuromuscular Re-ed, Patient Education, Self Care, Therapeutic Activites and Therapeutic Exercise.     Amanda Grant, PT

## 2023-08-09 ENCOUNTER — TELEPHONE (OUTPATIENT)
Dept: OPHTHALMOLOGY | Facility: CLINIC | Age: 88
End: 2023-08-09
Payer: MEDICARE

## 2023-08-09 NOTE — TELEPHONE ENCOUNTER
Called patient to reassure her the nasal spray does not contain a steroid and is safe to use with her glaucoma medication. Told her we can recheck the pressure at her next visit in November and as long as she continues the drops her pressure should stay in a good range.     ----- Message from Paola العلي sent at 8/9/2023 11:41 AM CDT -----  Regarding: pt called  Name of Who is Calling: SUDHA VIERA [[id number]]      What is the request in detail: pt is requesting call back about her inhaler ( stiolto espimat) to see if it may be affecting her glaucoma. Please advise         Can the clinic reply by [facility]: No      What Number to Call Back if not in [facility]: Telephone Information:        [phone]

## 2023-08-15 ENCOUNTER — LAB VISIT (OUTPATIENT)
Dept: LAB | Facility: HOSPITAL | Age: 88
End: 2023-08-15
Attending: PHYSICIAN ASSISTANT
Payer: MEDICARE

## 2023-08-15 DIAGNOSIS — I27.20 PULMONARY HYPERTENSION: ICD-10-CM

## 2023-08-15 DIAGNOSIS — I70.0 ATHEROSCLEROSIS OF AORTA: ICD-10-CM

## 2023-08-15 DIAGNOSIS — I27.9 PULMONARY HEART DISEASE: ICD-10-CM

## 2023-08-15 DIAGNOSIS — I48.21 ATRIAL FIBRILLATION, PERMANENT: Chronic | ICD-10-CM

## 2023-08-15 LAB
ALBUMIN SERPL BCP-MCNC: 3.8 G/DL (ref 3.5–5.2)
ALP SERPL-CCNC: 59 U/L (ref 55–135)
ALT SERPL W/O P-5'-P-CCNC: 16 U/L (ref 10–44)
ANION GAP SERPL CALC-SCNC: 7 MMOL/L (ref 8–16)
AST SERPL-CCNC: 25 U/L (ref 10–40)
BASOPHILS # BLD AUTO: 0.03 K/UL (ref 0–0.2)
BASOPHILS NFR BLD: 0.8 % (ref 0–1.9)
BILIRUB SERPL-MCNC: 0.6 MG/DL (ref 0.1–1)
BUN SERPL-MCNC: 27 MG/DL (ref 8–23)
CALCIUM SERPL-MCNC: 9.9 MG/DL (ref 8.7–10.5)
CHLORIDE SERPL-SCNC: 104 MMOL/L (ref 95–110)
CHOLEST SERPL-MCNC: 133 MG/DL (ref 120–199)
CHOLEST/HDLC SERPL: 2.3 {RATIO} (ref 2–5)
CO2 SERPL-SCNC: 29 MMOL/L (ref 23–29)
CREAT SERPL-MCNC: 0.7 MG/DL (ref 0.5–1.4)
DIFFERENTIAL METHOD: ABNORMAL
EOSINOPHIL # BLD AUTO: 0.2 K/UL (ref 0–0.5)
EOSINOPHIL NFR BLD: 4.1 % (ref 0–8)
ERYTHROCYTE [DISTWIDTH] IN BLOOD BY AUTOMATED COUNT: 13.9 % (ref 11.5–14.5)
EST. GFR  (NO RACE VARIABLE): >60 ML/MIN/1.73 M^2
GLUCOSE SERPL-MCNC: 53 MG/DL (ref 70–110)
HCT VFR BLD AUTO: 44.3 % (ref 37–48.5)
HDLC SERPL-MCNC: 57 MG/DL (ref 40–75)
HDLC SERPL: 42.9 % (ref 20–50)
HGB BLD-MCNC: 14 G/DL (ref 12–16)
IMM GRANULOCYTES # BLD AUTO: 0.01 K/UL (ref 0–0.04)
IMM GRANULOCYTES NFR BLD AUTO: 0.3 % (ref 0–0.5)
LDLC SERPL CALC-MCNC: 69.8 MG/DL (ref 63–159)
LYMPHOCYTES # BLD AUTO: 1.2 K/UL (ref 1–4.8)
LYMPHOCYTES NFR BLD: 30.7 % (ref 18–48)
MCH RBC QN AUTO: 29.5 PG (ref 27–31)
MCHC RBC AUTO-ENTMCNC: 31.6 G/DL (ref 32–36)
MCV RBC AUTO: 93 FL (ref 82–98)
MONOCYTES # BLD AUTO: 0.4 K/UL (ref 0.3–1)
MONOCYTES NFR BLD: 10.2 % (ref 4–15)
NEUTROPHILS # BLD AUTO: 2.1 K/UL (ref 1.8–7.7)
NEUTROPHILS NFR BLD: 53.9 % (ref 38–73)
NONHDLC SERPL-MCNC: 76 MG/DL
NRBC BLD-RTO: 0 /100 WBC
PLATELET # BLD AUTO: 148 K/UL (ref 150–450)
PMV BLD AUTO: 11.4 FL (ref 9.2–12.9)
POTASSIUM SERPL-SCNC: 4.4 MMOL/L (ref 3.5–5.1)
PROT SERPL-MCNC: 6.8 G/DL (ref 6–8.4)
RBC # BLD AUTO: 4.75 M/UL (ref 4–5.4)
SODIUM SERPL-SCNC: 140 MMOL/L (ref 136–145)
TRIGL SERPL-MCNC: 31 MG/DL (ref 30–150)
WBC # BLD AUTO: 3.91 K/UL (ref 3.9–12.7)

## 2023-08-15 PROCEDURE — 80053 COMPREHEN METABOLIC PANEL: CPT | Mod: HCNC | Performed by: PHYSICIAN ASSISTANT

## 2023-08-15 PROCEDURE — 85025 COMPLETE CBC W/AUTO DIFF WBC: CPT | Mod: HCNC | Performed by: PHYSICIAN ASSISTANT

## 2023-08-15 PROCEDURE — 80061 LIPID PANEL: CPT | Mod: HCNC | Performed by: PHYSICIAN ASSISTANT

## 2023-08-21 ENCOUNTER — OFFICE VISIT (OUTPATIENT)
Dept: INTERNAL MEDICINE | Facility: CLINIC | Age: 88
End: 2023-08-21
Payer: MEDICARE

## 2023-08-21 VITALS
OXYGEN SATURATION: 95 % | SYSTOLIC BLOOD PRESSURE: 120 MMHG | HEIGHT: 67 IN | WEIGHT: 130.75 LBS | DIASTOLIC BLOOD PRESSURE: 80 MMHG | HEART RATE: 68 BPM | BODY MASS INDEX: 20.52 KG/M2 | TEMPERATURE: 98 F

## 2023-08-21 DIAGNOSIS — D69.6 LOW PLATELET COUNT: ICD-10-CM

## 2023-08-21 DIAGNOSIS — Z79.01 ANTICOAGULATED: ICD-10-CM

## 2023-08-21 DIAGNOSIS — I48.21 ATRIAL FIBRILLATION, PERMANENT: Chronic | ICD-10-CM

## 2023-08-21 DIAGNOSIS — R91.1 LUNG NODULE: ICD-10-CM

## 2023-08-21 DIAGNOSIS — E04.1 THYROID NODULE: ICD-10-CM

## 2023-08-21 DIAGNOSIS — I50.32 CHRONIC DIASTOLIC HEART FAILURE: ICD-10-CM

## 2023-08-21 DIAGNOSIS — Z78.0 ASYMPTOMATIC POSTMENOPAUSAL STATUS: ICD-10-CM

## 2023-08-21 DIAGNOSIS — J43.2 CENTRILOBULAR EMPHYSEMA: ICD-10-CM

## 2023-08-21 DIAGNOSIS — M85.89 OSTEOPENIA OF MULTIPLE SITES: ICD-10-CM

## 2023-08-21 DIAGNOSIS — I70.0 ATHEROSCLEROSIS OF AORTA: ICD-10-CM

## 2023-08-21 DIAGNOSIS — Z00.00 ROUTINE HEALTH MAINTENANCE: Primary | ICD-10-CM

## 2023-08-21 PROCEDURE — 99397 PER PM REEVAL EST PAT 65+ YR: CPT | Mod: HCNC,S$GLB,, | Performed by: FAMILY MEDICINE

## 2023-08-21 PROCEDURE — 3288F PR FALLS RISK ASSESSMENT DOCUMENTED: ICD-10-PCS | Mod: HCNC,CPTII,S$GLB, | Performed by: FAMILY MEDICINE

## 2023-08-21 PROCEDURE — 1159F MED LIST DOCD IN RCRD: CPT | Mod: HCNC,CPTII,S$GLB, | Performed by: FAMILY MEDICINE

## 2023-08-21 PROCEDURE — 1126F PR PAIN SEVERITY QUANTIFIED, NO PAIN PRESENT: ICD-10-PCS | Mod: HCNC,CPTII,S$GLB, | Performed by: FAMILY MEDICINE

## 2023-08-21 PROCEDURE — 99397 PR PREVENTIVE VISIT,EST,65 & OVER: ICD-10-PCS | Mod: HCNC,S$GLB,, | Performed by: FAMILY MEDICINE

## 2023-08-21 PROCEDURE — 99999 PR PBB SHADOW E&M-EST. PATIENT-LVL III: ICD-10-PCS | Mod: PBBFAC,HCNC,, | Performed by: FAMILY MEDICINE

## 2023-08-21 PROCEDURE — 3288F FALL RISK ASSESSMENT DOCD: CPT | Mod: HCNC,CPTII,S$GLB, | Performed by: FAMILY MEDICINE

## 2023-08-21 PROCEDURE — 99999 PR PBB SHADOW E&M-EST. PATIENT-LVL III: CPT | Mod: PBBFAC,HCNC,, | Performed by: FAMILY MEDICINE

## 2023-08-21 PROCEDURE — 1159F PR MEDICATION LIST DOCUMENTED IN MEDICAL RECORD: ICD-10-PCS | Mod: HCNC,CPTII,S$GLB, | Performed by: FAMILY MEDICINE

## 2023-08-21 PROCEDURE — 1101F PT FALLS ASSESS-DOCD LE1/YR: CPT | Mod: HCNC,CPTII,S$GLB, | Performed by: FAMILY MEDICINE

## 2023-08-21 PROCEDURE — 1126F AMNT PAIN NOTED NONE PRSNT: CPT | Mod: HCNC,CPTII,S$GLB, | Performed by: FAMILY MEDICINE

## 2023-08-21 PROCEDURE — 1101F PR PT FALLS ASSESS DOC 0-1 FALLS W/OUT INJ PAST YR: ICD-10-PCS | Mod: HCNC,CPTII,S$GLB, | Performed by: FAMILY MEDICINE

## 2023-08-21 NOTE — PROGRESS NOTES
Subjective:      Patient ID: Destini Augustine is a 89 y.o. female.    Chief Complaint: Annual Exam    HPI recent l lung nodule saw Dr. Atkins pulmonary and PET-CT done showed negative avidity and smaller nodule ;reassurance also sees dr neda kenney has follow-up for COPD    Chronic diastolic heart failure atrial fibrillation anticoagulation up-to-date with Cardiology    Osteopenia 2023 bone density showed improvement so Fosamax discontinued    Low plat. Mild stable    Thy nodules seen u/s   Past Medical History:   Diagnosis Date    Arthritis     knees, hands    Atrial fibrillation     Basal cell carcinoma     face, right thigh    Chronic diastolic heart failure 2023    COAG (chronic open-angle glaucoma) - Both Eyes 2013    COPD exacerbation 2023    Ex-smoker     Hamartoma     mandible    Macular degeneration     Osteopenia      shama     Pulmonary heart disease     Scapholunate ligament injury with no instability     Scoliosis 11/30/15    lumbar x-ray      Past Surgical History:   Procedure Laterality Date    APPENDECTOMY  1970s    CATARACT EXTRACTION Bilateral     DR. Singleton    hysterectomy      complete    HYSTERECTOMY      about 39yrs old, partial    MOUTH SURGERY Bilateral 02/10/2020    rectocele and cystocele  1998 approx    TONSILLECTOMY, ADENOIDECTOMY  7 y/o      Social History     Socioeconomic History    Marital status:     Number of children: 2   Tobacco Use    Smoking status: Former     Current packs/day: 0.00     Average packs/day: 1.5 packs/day for 30.0 years (45.0 ttl pk-yrs)     Types: Cigarettes     Start date: 1950     Quit date: 1980     Years since quittin.5    Smokeless tobacco: Never   Substance and Sexual Activity    Alcohol use: Not Currently    Drug use: No    Sexual activity: Never     Partners: Female   Social History Narrative    Lives alone, no pets or smokers in household.     Social Determinants of  Health     Financial Resource Strain: Low Risk  (5/28/2023)    Overall Financial Resource Strain (CARDIA)     Difficulty of Paying Living Expenses: Not hard at all   Food Insecurity: No Food Insecurity (5/28/2023)    Hunger Vital Sign     Worried About Running Out of Food in the Last Year: Never true     Ran Out of Food in the Last Year: Never true   Transportation Needs: No Transportation Needs (5/28/2023)    PRAPARE - Transportation     Lack of Transportation (Medical): No     Lack of Transportation (Non-Medical): No   Physical Activity: Sufficiently Active (5/28/2023)    Exercise Vital Sign     Days of Exercise per Week: 4 days     Minutes of Exercise per Session: 60 min   Stress: No Stress Concern Present (5/28/2023)    [employer] [employer] - Occupational Stress Questionnaire     Feeling of Stress : Not at all   Social Connections: Socially Isolated (5/28/2023)    Social Connection and Isolation Panel [NHANES]     Frequency of Communication with Friends and Family: Once a week     Frequency of Social Gatherings with Friends and Family: Once a week     Attends Taoist Services: More than 4 times per year     Active Member of Clubs or Organizations: Patient refused     Attends Club or Organization Meetings: Never     Marital Status:    Housing Stability: Unknown (5/28/2023)    Housing Stability Vital Sign     Unable to Pay for Housing in the Last Year: No     Unstable Housing in the Last Year: No      Family History   Problem Relation Age of Onset    Cataracts Mother     Hypertension Mother     Stroke Mother     Cancer Father         colon    Thyroid disease Father     Cancer Brother         lung    Tuberculosis Brother     Blindness Neg Hx     Diabetes Neg Hx     Macular degeneration Neg Hx     Retinal detachment Neg Hx     Strabismus Neg Hx       Review of Systems    Cardiovascular: no chest pain  Chest: no shortness of breath  Abd: no abd pain  Remainder  review of systems negative      Objective:     Physical Exam  Vitals and nursing note reviewed.   Constitutional:       General: She is not in acute distress.     Appearance: She is well-developed.   HENT:      Head: Atraumatic.      Right Ear: External ear normal.      Left Ear: External ear normal.      Ears:      Comments: Ci removed bilat and tms ok     Nose: Nose normal.      Mouth/Throat:      Pharynx: No oropharyngeal exudate.   Eyes:      General: No scleral icterus.     Conjunctiva/sclera: Conjunctivae normal.      Pupils: Pupils are equal, round, and reactive to light.   Neck:      Thyroid: No thyromegaly.   Cardiovascular:      Rate and Rhythm: Normal rate. Rhythm irregular.      Heart sounds: Normal heart sounds. No murmur heard.  Pulmonary:      Effort: Pulmonary effort is normal. No respiratory distress.      Breath sounds: Normal breath sounds. No wheezing or rales.   Abdominal:      General: Bowel sounds are normal. There is no distension.      Palpations: Abdomen is soft. There is no mass.      Tenderness: There is no abdominal tenderness. There is no guarding or rebound.   Musculoskeletal:         General: No tenderness. Normal range of motion.      Cervical back: Normal range of motion and neck supple.   Lymphadenopathy:      Cervical: No cervical adenopathy.   Skin:     General: Skin is warm.      Coloration: Skin is not pale.      Findings: No erythema or rash.   Neurological:      Mental Status: She is alert and oriented to person, place, and time.      Cranial Nerves: No cranial nerve deficit.      Motor: No abnormal muscle tone.      Coordination: Coordination normal.   Psychiatric:         Behavior: Behavior normal.         Thought Content: Thought content normal.         Judgment: Judgment normal.     Assessment:         ICD-10-CM ICD-9-CM   1. Routine health maintenance  Z00.00 V70.0   2. Atrial fibrillation, permanent  I48.21 427.31   3. Osteopenia of multiple sites  M85.89 733.90   4.  Atherosclerosis of aorta  I70.0 440.0   5. Anticoagulated  Z79.01 V58.61   6. Centrilobular emphysema  J43.2 492.8   7. Chronic diastolic heart failure  I50.32 428.32   8. Lung nodule  R91.1 793.11   9. Asymptomatic postmenopausal status  Z78.0 V49.81    Low platelet  Thyroid nodules  Plan:      Ct chest recommended annual per radiol report. Will defer to dr neda Simon u/s 12/23 or after  Dexa due late spring 25  Lab 12 months and follow up after      1. Routine health maintenance    2. Atrial fibrillation, permanent    3. Osteopenia of multiple sites        4. Atherosclerosis of aorta  Overview:  X-ray lumbar spine 9/21/2006      5. Anticoagulated    6. Centrilobular emphysema    7. Chronic diastolic heart failure    8. Lung nodule    9. Asymptomatic postmenopausal status       F/u pulm , card when due

## 2023-09-13 ENCOUNTER — OFFICE VISIT (OUTPATIENT)
Dept: PULMONOLOGY | Facility: CLINIC | Age: 88
End: 2023-09-13
Payer: MEDICARE

## 2023-09-13 VITALS
SYSTOLIC BLOOD PRESSURE: 130 MMHG | HEIGHT: 67 IN | DIASTOLIC BLOOD PRESSURE: 78 MMHG | BODY MASS INDEX: 20.66 KG/M2 | HEART RATE: 81 BPM | WEIGHT: 131.63 LBS | RESPIRATION RATE: 16 BRPM | OXYGEN SATURATION: 95 %

## 2023-09-13 DIAGNOSIS — J43.2 CENTRILOBULAR EMPHYSEMA: ICD-10-CM

## 2023-09-13 DIAGNOSIS — R91.1 SOLITARY PULMONARY NODULE: Primary | ICD-10-CM

## 2023-09-13 PROCEDURE — 99214 OFFICE O/P EST MOD 30 MIN: CPT | Mod: HCNC,S$GLB,, | Performed by: INTERNAL MEDICINE

## 2023-09-13 PROCEDURE — 1160F PR REVIEW ALL MEDS BY PRESCRIBER/CLIN PHARMACIST DOCUMENTED: ICD-10-PCS | Mod: HCNC,CPTII,S$GLB, | Performed by: INTERNAL MEDICINE

## 2023-09-13 PROCEDURE — 1159F MED LIST DOCD IN RCRD: CPT | Mod: HCNC,CPTII,S$GLB, | Performed by: INTERNAL MEDICINE

## 2023-09-13 PROCEDURE — 1160F RVW MEDS BY RX/DR IN RCRD: CPT | Mod: HCNC,CPTII,S$GLB, | Performed by: INTERNAL MEDICINE

## 2023-09-13 PROCEDURE — 3288F PR FALLS RISK ASSESSMENT DOCUMENTED: ICD-10-PCS | Mod: HCNC,CPTII,S$GLB, | Performed by: INTERNAL MEDICINE

## 2023-09-13 PROCEDURE — 99214 PR OFFICE/OUTPT VISIT, EST, LEVL IV, 30-39 MIN: ICD-10-PCS | Mod: HCNC,S$GLB,, | Performed by: INTERNAL MEDICINE

## 2023-09-13 PROCEDURE — 99999 PR PBB SHADOW E&M-EST. PATIENT-LVL III: ICD-10-PCS | Mod: PBBFAC,HCNC,, | Performed by: INTERNAL MEDICINE

## 2023-09-13 PROCEDURE — 1101F PR PT FALLS ASSESS DOC 0-1 FALLS W/OUT INJ PAST YR: ICD-10-PCS | Mod: HCNC,CPTII,S$GLB, | Performed by: INTERNAL MEDICINE

## 2023-09-13 PROCEDURE — 1159F PR MEDICATION LIST DOCUMENTED IN MEDICAL RECORD: ICD-10-PCS | Mod: HCNC,CPTII,S$GLB, | Performed by: INTERNAL MEDICINE

## 2023-09-13 PROCEDURE — 1101F PT FALLS ASSESS-DOCD LE1/YR: CPT | Mod: HCNC,CPTII,S$GLB, | Performed by: INTERNAL MEDICINE

## 2023-09-13 PROCEDURE — 99999 PR PBB SHADOW E&M-EST. PATIENT-LVL III: CPT | Mod: PBBFAC,HCNC,, | Performed by: INTERNAL MEDICINE

## 2023-09-13 PROCEDURE — 3288F FALL RISK ASSESSMENT DOCD: CPT | Mod: HCNC,CPTII,S$GLB, | Performed by: INTERNAL MEDICINE

## 2023-09-13 NOTE — PROGRESS NOTES
"Subjective:      Patient ID: Destini Augustine is a 89 y.o. female.    Chief Complaint: COPD    COPD        89-year-old female with emphysema and right upper lobe lung nodule who had a PET-CT done last month which showed reduction in size and no PET avidity of the right upper lobe nodule.  Here for follow-up of that and for follow up of her COPD.  Overall doing well on Stiolto Respimat no new complaints    Review of Systems as per hPI otherwise negative    Objective:     Physical Exam   Constitutional: She is oriented to person, place, and time. She appears well-developed. No distress.   HENT:   Head: Normocephalic.   Cardiovascular: Normal rate and regular rhythm.   Pulmonary/Chest: Normal expansion, hyperinflation, effort normal and breath sounds normal.   Musculoskeletal:      Cervical back: Neck supple.   Neurological: She is alert and oriented to person, place, and time.   Psychiatric: She has a normal mood and affect.   Nursing note and vitals reviewed.          9/13/2023     1:32 PM 8/21/2023     2:06 PM 6/13/2023     2:21 PM 5/29/2023     1:29 PM 5/28/2023     1:17 PM 5/28/2023    12:17 PM 5/28/2023    11:12 AM   Pulmonary Function Tests   SpO2 95 % 95 % 95 % 94 % 91 % 91 %    Height 5' 7" (1.702 m) 5' 7" (1.702 m) 5' 7" (1.702 m) 5' 7" (1.702 m)   5' 7" (1.702 m)   Weight 59.7 kg (131 lb 9.8 oz) 59.3 kg (130 lb 11.7 oz) 58.3 kg (128 lb 8.5 oz) 58.3 kg (128 lb 8.5 oz)      BMI (Calculated) 20.6 20.5 20.1 20.1           Assessment:     1. Solitary pulmonary nodule    2. Centrilobular emphysema         Orders Placed This Encounter   Procedures    CT Chest Without Contrast     Standing Status:   Future     Standing Expiration Date:   9/13/2025     Order Specific Question:   May the Radiologist modify the order per protocol to meet the clinical needs of the patient?     Answer:   Yes         Plan:     Low index of suspicion for malignancy  Would repeat CT in 1 year  COPD symptoms controlled on current inhaler  We " will see back in 1 year with CT, sooner if needed

## 2023-09-19 DIAGNOSIS — H40.1132 PRIMARY OPEN ANGLE GLAUCOMA OF BOTH EYES, MODERATE STAGE: ICD-10-CM

## 2023-09-19 RX ORDER — LATANOPROST 50 UG/ML
SOLUTION/ DROPS OPHTHALMIC
Qty: 3 ML | Refills: 12 | Status: SHIPPED | OUTPATIENT
Start: 2023-09-19

## 2023-10-31 ENCOUNTER — OFFICE VISIT (OUTPATIENT)
Dept: CARDIOLOGY | Facility: CLINIC | Age: 88
End: 2023-10-31
Payer: MEDICARE

## 2023-10-31 VITALS
HEART RATE: 84 BPM | DIASTOLIC BLOOD PRESSURE: 75 MMHG | BODY MASS INDEX: 20.66 KG/M2 | OXYGEN SATURATION: 93 % | WEIGHT: 131.63 LBS | RESPIRATION RATE: 16 BRPM | HEIGHT: 67 IN | SYSTOLIC BLOOD PRESSURE: 134 MMHG

## 2023-10-31 DIAGNOSIS — I36.1 NONRHEUMATIC TRICUSPID VALVE REGURGITATION: ICD-10-CM

## 2023-10-31 DIAGNOSIS — I70.0 ATHEROSCLEROSIS OF AORTA: ICD-10-CM

## 2023-10-31 DIAGNOSIS — R06.00 DYSPNEA, UNSPECIFIED TYPE: ICD-10-CM

## 2023-10-31 DIAGNOSIS — R79.89 ELEVATED BRAIN NATRIURETIC PEPTIDE (BNP) LEVEL: ICD-10-CM

## 2023-10-31 DIAGNOSIS — I48.21 ATRIAL FIBRILLATION, PERMANENT: Primary | Chronic | ICD-10-CM

## 2023-10-31 DIAGNOSIS — R94.31 ABNORMAL ECG: ICD-10-CM

## 2023-10-31 DIAGNOSIS — I34.0 NONRHEUMATIC MITRAL VALVE REGURGITATION: ICD-10-CM

## 2023-10-31 DIAGNOSIS — I27.20 PULMONARY HYPERTENSION: ICD-10-CM

## 2023-10-31 DIAGNOSIS — I50.32 CHRONIC DIASTOLIC HEART FAILURE: ICD-10-CM

## 2023-10-31 PROCEDURE — 1126F AMNT PAIN NOTED NONE PRSNT: CPT | Mod: HCNC,CPTII,S$GLB, | Performed by: INTERNAL MEDICINE

## 2023-10-31 PROCEDURE — 1159F MED LIST DOCD IN RCRD: CPT | Mod: HCNC,CPTII,S$GLB, | Performed by: INTERNAL MEDICINE

## 2023-10-31 PROCEDURE — 99214 PR OFFICE/OUTPT VISIT, EST, LEVL IV, 30-39 MIN: ICD-10-PCS | Mod: HCNC,S$GLB,, | Performed by: INTERNAL MEDICINE

## 2023-10-31 PROCEDURE — 1159F PR MEDICATION LIST DOCUMENTED IN MEDICAL RECORD: ICD-10-PCS | Mod: HCNC,CPTII,S$GLB, | Performed by: INTERNAL MEDICINE

## 2023-10-31 PROCEDURE — 1160F RVW MEDS BY RX/DR IN RCRD: CPT | Mod: HCNC,CPTII,S$GLB, | Performed by: INTERNAL MEDICINE

## 2023-10-31 PROCEDURE — 99214 OFFICE O/P EST MOD 30 MIN: CPT | Mod: HCNC,S$GLB,, | Performed by: INTERNAL MEDICINE

## 2023-10-31 PROCEDURE — 1160F PR REVIEW ALL MEDS BY PRESCRIBER/CLIN PHARMACIST DOCUMENTED: ICD-10-PCS | Mod: HCNC,CPTII,S$GLB, | Performed by: INTERNAL MEDICINE

## 2023-10-31 PROCEDURE — 1126F PR PAIN SEVERITY QUANTIFIED, NO PAIN PRESENT: ICD-10-PCS | Mod: HCNC,CPTII,S$GLB, | Performed by: INTERNAL MEDICINE

## 2023-10-31 PROCEDURE — 99999 PR PBB SHADOW E&M-EST. PATIENT-LVL III: ICD-10-PCS | Mod: PBBFAC,HCNC,, | Performed by: INTERNAL MEDICINE

## 2023-10-31 PROCEDURE — 99999 PR PBB SHADOW E&M-EST. PATIENT-LVL III: CPT | Mod: PBBFAC,HCNC,, | Performed by: INTERNAL MEDICINE

## 2023-10-31 RX ORDER — FUROSEMIDE 20 MG/1
20 TABLET ORAL DAILY PRN
Qty: 30 TABLET | Refills: 11 | Status: SHIPPED | OUTPATIENT
Start: 2023-10-31 | End: 2024-03-05

## 2023-10-31 NOTE — PROGRESS NOTES
Subjective:    Patient ID:  Destini Augustine is a 89 y.o. female who presents for evaluation of Congestive Heart Failure, Atrial Fibrillation, Hyperlipidemia, and Hypertension        HPI: Pt presents for eval.  Her current med conditions include permanent a fib, PHTN, AI, DD, RV failure, COPD, TR, MR, aortic valve sclerosis.  Nonsmoker.  Past hx pertinent for following:  F/u by Dr Gibson, Cardiology, who retired.  Echo 2016 normal EF, aortic valve sclerosis, mild TR, mild-mod TR, PAP 46 mmHg.  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  ecg May 2022 a fib w controlled VR, left axis, chronic septal infarct.  Pt admitted May 2023 with CP, COPD exacerbation.  Ecg showed no acute ischemia.  -  troponin levels.  BNP was elevated, 302 (no old to compare).  Tx for COPD exacerbation and improved.  Dr. Taylor, Cards, evaluated pt.  Echo May 2023:  normal LVEF, DD, moderate RVE with moderate RV dysfunction, LAE, mod MR, mild AI, mod-severe TR, PAP 87 mmHg.  Now here.  Stable JACKSON.  Uses inhalers.  No worsening dyspnea.  No angina/CP sxs.  BNP 8/23: 250  Not on diuretics.  BP stable.  No syncope.  She is trying to get some exercise.  Weight stable.  Compliant w Eliquis.        Past Medical History:   Diagnosis Date    Arthritis     knees, hands    Atrial fibrillation     Basal cell carcinoma     face, right thigh    Chronic diastolic heart failure 5/29/2023    COAG (chronic open-angle glaucoma) - Both Eyes 8/28/2013    COPD exacerbation 5/27/2023    Ex-smoker     Hamartoma     mandible    Macular degeneration     Osteopenia     7/14 shama 7/16    Pulmonary heart disease     Scapholunate ligament injury with no instability     Scoliosis 11/30/15    lumbar x-ray     Current Outpatient Medications   Medication Instructions    ELIQUIS 2.5 mg, Oral, 2 times daily    furosemide (LASIX) 20 mg, Oral, Daily PRN    latanoprost 0.005 % ophthalmic solution INSTILL 1 DROP INTO BOTH EYES EVERY NIGHT     "metoprolol succinate (TOPROL-XL) 25 mg, Oral    multivitamin (THERAGRAN) per tablet 1 tablet, Oral, Daily    timolol maleate 0.5% (TIMOPTIC) 0.5 % Drop 1 drop, Right Eye, Every morning    tiotropium-olodateroL (STIOLTO RESPIMAT) 2.5-2.5 mcg/actuation Mist 1 puff, Inhalation, Daily, Controller    turmeric root extract 500 mg, Oral, Daily         Review of Systems   Constitutional: Negative.   HENT: Negative.     Eyes: Negative.    Cardiovascular:  Positive for chest pain and dyspnea on exertion.   Respiratory:  Positive for shortness of breath.    Endocrine: Negative.    Hematologic/Lymphatic: Negative.    Skin: Negative.    Musculoskeletal:  Positive for arthritis.   Gastrointestinal: Negative.    Genitourinary: Negative.    Neurological:  Positive for weakness.   Psychiatric/Behavioral: Negative.     Allergic/Immunologic: Negative.        /75 (BP Location: Left arm, Patient Position: Sitting, BP Method: Large (Manual))   Pulse 84   Resp 16   Ht 5' 7" (1.702 m)   Wt 59.7 kg (131 lb 9.8 oz)   SpO2 (!) 93%   BMI 20.61 kg/m²     Wt Readings from Last 3 Encounters:   10/31/23 59.7 kg (131 lb 9.8 oz)   09/13/23 59.7 kg (131 lb 9.8 oz)   08/21/23 59.3 kg (130 lb 11.7 oz)     Temp Readings from Last 3 Encounters:   08/21/23 98.1 °F (36.7 °C) (Tympanic)   05/28/23 99.1 °F (37.3 °C) (Oral)   05/15/23 98.6 °F (37 °C) (Tympanic)     BP Readings from Last 3 Encounters:   10/31/23 134/75   09/13/23 130/78   08/21/23 120/80     Pulse Readings from Last 3 Encounters:   10/31/23 84   09/13/23 81   08/21/23 68          Objective:    Physical Exam  Vitals and nursing note reviewed.   Constitutional:       General: She is not in acute distress.     Appearance: Normal appearance. She is well-developed. She is not ill-appearing or diaphoretic.   HENT:      Head: Normocephalic.   Neck:      Thyroid: No thyromegaly.      Vascular: No carotid bruit or JVD.   Cardiovascular:      Rate and Rhythm: Normal rate. Rhythm irregularly " "irregular.      Pulses: Normal pulses.           Radial pulses are 2+ on the right side and 2+ on the left side.      Heart sounds: Normal heart sounds, S1 normal and S2 normal. No murmur heard.     No friction rub. No gallop.   Pulmonary:      Effort: Pulmonary effort is normal.      Breath sounds: Normal breath sounds. No wheezing or rales.   Abdominal:      General: Bowel sounds are normal. There is no abdominal bruit.      Palpations: Abdomen is soft.      Tenderness: There is no abdominal tenderness.   Musculoskeletal:      Cervical back: Neck supple.   Lymphadenopathy:      Cervical: No cervical adenopathy.   Skin:     General: Skin is warm.   Neurological:      Mental Status: She is alert and oriented to person, place, and time.   Psychiatric:         Behavior: Behavior normal. Behavior is cooperative.       I have reviewed all pertinent labs and cardiac studies.    Component      Latest Ref Rng 8/15/2023   BNP      0 - 99 pg/mL 250 (H)       Legend:  (H) High    Component      Latest Ref Rng & Units 5/27/2023   BNP      0 - 99 pg/mL 302 (H)       Chemistry        Component Value Date/Time     08/15/2023 0950    K 4.4 08/15/2023 0950     08/15/2023 0950    CO2 29 08/15/2023 0950    BUN 27 (H) 08/15/2023 0950    CREATININE 0.7 08/15/2023 0950    GLU 53 (L) 08/15/2023 0950        Component Value Date/Time    CALCIUM 9.9 08/15/2023 0950    ALKPHOS 59 08/15/2023 0950    AST 25 08/15/2023 0950    ALT 16 08/15/2023 0950    BILITOT 0.6 08/15/2023 0950    ESTGFRAFRICA >60.0 08/12/2021 0956    EGFRNONAA >60.0 08/12/2021 0956        Lab Results   Component Value Date    WBC 3.91 08/15/2023    HGB 14.0 08/15/2023    HCT 44.3 08/15/2023    MCV 93 08/15/2023     (L) 08/15/2023       Lab Results   Component Value Date    TSH 1.055 10/05/2017     No results found for: "LABA1C", "HGBA1C"  Lab Results   Component Value Date    CHOL 133 08/15/2023    CHOL 140 08/12/2021    CHOL 149 10/05/2017     Lab Results "   Component Value Date    HDL 57 08/15/2023    HDL 66 08/12/2021    HDL 68 10/05/2017     Lab Results   Component Value Date    LDLCALC 69.8 08/15/2023    LDLCALC 61.2 (L) 08/12/2021    LDLCALC 68.4 10/05/2017     Lab Results   Component Value Date    TRIG 31 08/15/2023    TRIG 64 08/12/2021    TRIG 63 10/05/2017     Lab Results   Component Value Date    CHOLHDL 42.9 08/15/2023    CHOLHDL 47.1 08/12/2021    CHOLHDL 45.6 10/05/2017         Results for orders placed during the hospital encounter of 05/16/23    Echo    Interpretation Summary  · The left ventricle is normal in size with concentric remodeling and normal systolic function.  · Severe left atrial enlargement.  · Indeterminate left ventricular diastolic function.  · The estimated PA systolic pressure is 87 mmHg.  · Moderate right ventricular enlargement with moderately reduced right ventricular systolic function.  · There is pulmonary hypertension.  · Elevated central venous pressure (15 mmHg).  · The estimated ejection fraction is 60%.  · There is abnormal septal wall motion. There is systolic flattening of the interventricular septum consistent with right ventricle pressure overload.  · Severe right atrial enlargement.  · Mild aortic regurgitation.  · Moderate mitral regurgitation.  · Moderate to severe tricuspid regurgitation.  · Mild pulmonic regurgitation.        Assessment:       1. Atrial fibrillation, permanent    2. Atherosclerosis of aorta    3. Abnormal ECG    4. Chronic diastolic heart failure    5. Nonrheumatic tricuspid valve regurgitation    6. Nonrheumatic mitral valve regurgitation    7. Dyspnea, unspecified type    8. Pulmonary hypertension    9. Elevated brain natriuretic peptide (BNP) level         Plan:               Stable CV status on current med tx.  Diastolic CHF: Compensated.  Low salt diet.   Add Lasix 20 mg prn basis for CHF sxs.  Pt counseled on CHF mgt.  Daily weights.  Low salt diet.  COPD: F/u w Pulmonary as scheduled.  RV  failure: likely due  to severe COPD.  Permanent a fib: rate control strategy. Continue Eliquis bid.  PHTN: due to COPD, diastolic CHF.  F/u w Pulmonary.  F/u echo in future.  Valvular heart conditions: monitor. F/u echo in future.  Reviewed all tests and above medical conditions with patient in detail and formulated treatment plan.  Continue optimal medical treatment for cardiovascular conditions.  Cardiac low salt diet advised.  Daily exercise as tolerated.  Maintaining healthy weight and weight loss goals (if needed) were discussed in clinic.  Need for BP control and HTN goals (if needed) were discussed and tx plan formulated.  Importance of optimal lipid control were discussed in detail as well as possible pharmacologic and lifestyle changes that may be needed.  Abnl ecg: chronic abnormalities. monitor.    F/u in 4 months w echo +CMP + BNP.      I have reviewed all pertinent labs and cardiac studies independently. Plans and recommendations have been formulated under my direct supervision. All questions answered and patient voiced understanding.

## 2023-11-06 ENCOUNTER — OFFICE VISIT (OUTPATIENT)
Dept: OPHTHALMOLOGY | Facility: CLINIC | Age: 88
End: 2023-11-06
Payer: MEDICARE

## 2023-11-06 DIAGNOSIS — H40.1132 PRIMARY OPEN ANGLE GLAUCOMA OF BOTH EYES, MODERATE STAGE: Primary | ICD-10-CM

## 2023-11-06 DIAGNOSIS — Z96.1 PSEUDOPHAKIA OF BOTH EYES: ICD-10-CM

## 2023-11-06 PROCEDURE — 1126F PR PAIN SEVERITY QUANTIFIED, NO PAIN PRESENT: ICD-10-PCS | Mod: HCNC,CPTII,S$GLB, | Performed by: OPHTHALMOLOGY

## 2023-11-06 PROCEDURE — 1160F RVW MEDS BY RX/DR IN RCRD: CPT | Mod: HCNC,CPTII,S$GLB, | Performed by: OPHTHALMOLOGY

## 2023-11-06 PROCEDURE — 99214 OFFICE O/P EST MOD 30 MIN: CPT | Mod: HCNC,S$GLB,, | Performed by: OPHTHALMOLOGY

## 2023-11-06 PROCEDURE — 1126F AMNT PAIN NOTED NONE PRSNT: CPT | Mod: HCNC,CPTII,S$GLB, | Performed by: OPHTHALMOLOGY

## 2023-11-06 PROCEDURE — 99214 PR OFFICE/OUTPT VISIT, EST, LEVL IV, 30-39 MIN: ICD-10-PCS | Mod: HCNC,S$GLB,, | Performed by: OPHTHALMOLOGY

## 2023-11-06 PROCEDURE — 1160F PR REVIEW ALL MEDS BY PRESCRIBER/CLIN PHARMACIST DOCUMENTED: ICD-10-PCS | Mod: HCNC,CPTII,S$GLB, | Performed by: OPHTHALMOLOGY

## 2023-11-06 PROCEDURE — 99999 PR PBB SHADOW E&M-EST. PATIENT-LVL II: ICD-10-PCS | Mod: PBBFAC,HCNC,, | Performed by: OPHTHALMOLOGY

## 2023-11-06 PROCEDURE — 99999 PR PBB SHADOW E&M-EST. PATIENT-LVL II: CPT | Mod: PBBFAC,HCNC,, | Performed by: OPHTHALMOLOGY

## 2023-11-06 PROCEDURE — 92133 CPTRZD OPH DX IMG PST SGM ON: CPT | Mod: HCNC,S$GLB,, | Performed by: OPHTHALMOLOGY

## 2023-11-06 PROCEDURE — 1159F PR MEDICATION LIST DOCUMENTED IN MEDICAL RECORD: ICD-10-PCS | Mod: HCNC,CPTII,S$GLB, | Performed by: OPHTHALMOLOGY

## 2023-11-06 PROCEDURE — 1159F MED LIST DOCD IN RCRD: CPT | Mod: HCNC,CPTII,S$GLB, | Performed by: OPHTHALMOLOGY

## 2023-11-06 PROCEDURE — 92133 POSTERIOR SEGMENT OCT OPTIC NERVE(OCULAR COHERENCE TOMOGRAPHY) - OU - BOTH EYES: ICD-10-PCS | Mod: HCNC,S$GLB,, | Performed by: OPHTHALMOLOGY

## 2023-11-06 NOTE — PROGRESS NOTES
SUBJECTIVE  Destini Augustine is 89 y.o. female  Uncorrected distance visual acuity was 20/25 -1 in the right eye and 20/30 -2 in the left eye.   Chief Complaint   Patient presents with    Glaucoma     4 month IOP and GOCT check.  VA is stable. No pain or irritation. Compliant with gtts.          HPI     Glaucoma     Additional comments: 4 month IOP and GOCT check.  VA is stable. No pain   or irritation. Compliant with gtts.           Comments    1. Mod COAG OD>OS (Initial 30/26) goal = 17   2. PCIOL OD w/ ECP & Toric IOL 5/20/09   PCIOL OS w/ ECP & Toric IOL 4/22/09   3. Mild Dry AMD   4. PCO OS   5. Pseudophakia OU      Latanoprost QHS OU   Timolol BID OD     Ocuvite   HAG (does not do)   O3FO  WC/LS            Last edited by Isidoro Saini on 11/6/2023 11:23 AM.         Assessment /Plan :  1. Primary open angle glaucoma of both eyes, moderate stage Doing well, intraocular pressure (IOP) within acceptable range relative to target IOP and no evidence of progression. Continue current treatment. Reviewed importance of continued compliance with treatment and follow up.      Patient instructed to continue using the following glaucoma medication as follows:  Latanoprost one drop in each eye nightly and Timolol one drop in the right eye every 12 hours    Return to clinic in 4 months  or as needed.  With Dilation and HVF 24-2     2. Pseudophakia of both eyes  -- Condition stable, no therapeutic change required. Monitoring routinely.

## 2024-01-24 ENCOUNTER — HOSPITAL ENCOUNTER (OUTPATIENT)
Dept: RADIOLOGY | Facility: HOSPITAL | Age: 89
Discharge: HOME | End: 2024-01-24
Attending: FAMILY MEDICINE
Payer: MEDICARE

## 2024-01-24 DIAGNOSIS — E04.1 THYROID NODULE: ICD-10-CM

## 2024-01-24 PROCEDURE — 76536 US EXAM OF HEAD AND NECK: CPT | Mod: TC,HCNC

## 2024-01-24 PROCEDURE — 76536 US EXAM OF HEAD AND NECK: CPT | Mod: 26,HCNC,, | Performed by: RADIOLOGY

## 2024-02-01 ENCOUNTER — TELEPHONE (OUTPATIENT)
Dept: OPHTHALMOLOGY | Facility: CLINIC | Age: 89
End: 2024-02-01
Payer: MEDICARE

## 2024-02-01 NOTE — TELEPHONE ENCOUNTER
Called pt, explained I looked into her medication history and did not see that cream as being prescribed by Dr. Barrera. Advised her to check with her PCP or her Dermatologist. Pt voiced understanding and did say she was unable to read the Doctor's name and info in the prescription box, it had worn off. SRB      ----- Message from Smava Washington sent at 2/1/2024 10:03 AM CST -----  Contact: Destini  .Type:  RX Refill Request    Who Called:  Destini   Refill or New Rx: Refill > not in pts chart but states provider gave it to her in 18'.   RX Name and Strength: desonide cream 0.05%  How is the patient currently taking it? (ex. 1XDay): A  Is this a 30 day or 90 day RX: 30   Preferred Pharmacy with phone number: .  [address]  [address] [place] [id number]  Phone: [phone] Fax: [phone]  Local or Mail Order: local   Ordering Provider: Dr barrera   Would the patient rather a call back or a response via [facility]?  Call   Best Call Back Number: [phone]   Additional Information:

## 2024-02-20 ENCOUNTER — HOSPITAL ENCOUNTER (OUTPATIENT)
Dept: CARDIOLOGY | Facility: HOSPITAL | Age: 89
Discharge: HOME | End: 2024-02-20
Attending: INTERNAL MEDICINE
Payer: MEDICARE

## 2024-02-20 VITALS
HEIGHT: 67 IN | DIASTOLIC BLOOD PRESSURE: 75 MMHG | SYSTOLIC BLOOD PRESSURE: 134 MMHG | WEIGHT: 131 LBS | BODY MASS INDEX: 20.56 KG/M2

## 2024-02-20 DIAGNOSIS — I48.21 ATRIAL FIBRILLATION, PERMANENT: Chronic | ICD-10-CM

## 2024-02-20 DIAGNOSIS — I34.0 NONRHEUMATIC MITRAL VALVE REGURGITATION: ICD-10-CM

## 2024-02-20 DIAGNOSIS — R94.31 ABNORMAL ECG: ICD-10-CM

## 2024-02-20 DIAGNOSIS — I36.1 NONRHEUMATIC TRICUSPID VALVE REGURGITATION: ICD-10-CM

## 2024-02-20 DIAGNOSIS — R06.00 DYSPNEA, UNSPECIFIED TYPE: ICD-10-CM

## 2024-02-20 DIAGNOSIS — I50.32 CHRONIC DIASTOLIC HEART FAILURE: ICD-10-CM

## 2024-02-20 DIAGNOSIS — I27.20 PULMONARY HYPERTENSION: ICD-10-CM

## 2024-02-20 LAB
AORTIC ROOT ANNULUS: 2.93 CM
ASCENDING AORTA: 3.09 CM
AV INDEX (PROSTH): 0.77
AV MEAN GRADIENT: 3 MMHG
AV PEAK GRADIENT: 4 MMHG
AV REGURGITATION PRESSURE HALF TIME: 689.69 MS
AV VALVE AREA BY VELOCITY RATIO: 2.62 CM²
AV VALVE AREA: 2.72 CM²
AV VELOCITY RATIO: 0.74
BSA FOR ECHO PROCEDURE: 1.68 M2
CV ECHO LV RWT: 0.58 CM
DOP CALC AO PEAK VEL: 1.05 M/S
DOP CALC AO VTI: 19.6 CM
DOP CALC LVOT AREA: 3.5 CM2
DOP CALC LVOT DIAMETER: 2.12 CM
DOP CALC LVOT PEAK VEL: 0.78 M/S
DOP CALC LVOT STROKE VOLUME: 53.27 CM3
DOP CALC RVOT PEAK VEL: 0.36 M/S
DOP CALC RVOT VTI: 8 CM
DOP CALCLVOT PEAK VEL VTI: 15.1 CM
ECHO LV POSTERIOR WALL: 1.03 CM (ref 0.6–1.1)
FRACTIONAL SHORTENING: 33 % (ref 28–44)
INTERVENTRICULAR SEPTUM: 1.08 CM (ref 0.6–1.1)
IVC DIAMETER: 3.3 CM
IVRT: 79.92 MSEC
LA MAJOR: 6.65 CM
LA MINOR: 6.77 CM
LA WIDTH: 5 CM
LEFT ATRIUM SIZE: 5.1 CM
LEFT ATRIUM VOLUME INDEX MOD: 75 ML/M2
LEFT ATRIUM VOLUME INDEX: 86.1 ML/M2
LEFT ATRIUM VOLUME MOD: 126.79 CM3
LEFT ATRIUM VOLUME: 145.43 CM3
LEFT INTERNAL DIMENSION IN SYSTOLE: 2.38 CM (ref 2.1–4)
LEFT VENTRICLE DIASTOLIC VOLUME INDEX: 31.16 ML/M2
LEFT VENTRICLE DIASTOLIC VOLUME: 52.66 ML
LEFT VENTRICLE MASS INDEX: 68 G/M2
LEFT VENTRICLE SYSTOLIC VOLUME INDEX: 11.6 ML/M2
LEFT VENTRICLE SYSTOLIC VOLUME: 19.66 ML
LEFT VENTRICULAR INTERNAL DIMENSION IN DIASTOLE: 3.55 CM (ref 3.5–6)
LEFT VENTRICULAR MASS: 114.24 G
LVOT MG: 1.31 MMHG
LVOT MV: 0.53 CM/S
PISA AR MAX VEL: 3.79 M/S
PISA TR MAX VEL: 3.91 M/S
PV MEAN GRADIENT: 0 MMHG
PV PEAK GRADIENT: 1 MMHG
PV PEAK VELOCITY: 0.68 M/S
RA MAJOR: 6.84 CM
RA PRESSURE ESTIMATED: 15 MMHG
RA WIDTH: 5.69 CM
RIGHT VENTRICULAR END-DIASTOLIC DIMENSION: 4.03 CM
RV TB RVSP: 19 MMHG
SINUS: 3.16 CM
STJ: 2.96 CM
TR MAX PG: 61 MMHG
TRICUSPID ANNULAR PLANE SYSTOLIC EXCURSION: 1.29 CM
TV REST PULMONARY ARTERY PRESSURE: 76 MMHG
Z-SCORE OF LEFT VENTRICULAR DIMENSION IN END DIASTOLE: -2.82
Z-SCORE OF LEFT VENTRICULAR DIMENSION IN END SYSTOLE: -1.6

## 2024-02-20 PROCEDURE — 93306 TTE W/DOPPLER COMPLETE: CPT | Mod: 26,HCNC,, | Performed by: INTERNAL MEDICINE

## 2024-02-20 PROCEDURE — 93306 TTE W/DOPPLER COMPLETE: CPT | Mod: HCNC

## 2024-03-05 ENCOUNTER — OFFICE VISIT (OUTPATIENT)
Dept: CARDIOLOGY | Facility: CLINIC | Age: 89
End: 2024-03-05
Payer: MEDICARE

## 2024-03-05 VITALS
BODY MASS INDEX: 20.66 KG/M2 | SYSTOLIC BLOOD PRESSURE: 140 MMHG | WEIGHT: 131.63 LBS | OXYGEN SATURATION: 95 % | HEART RATE: 85 BPM | DIASTOLIC BLOOD PRESSURE: 80 MMHG | HEIGHT: 67 IN

## 2024-03-05 DIAGNOSIS — I50.810 RVF (RIGHT VENTRICULAR FAILURE): ICD-10-CM

## 2024-03-05 DIAGNOSIS — I50.32 CHRONIC DIASTOLIC HEART FAILURE: ICD-10-CM

## 2024-03-05 DIAGNOSIS — I48.21 ATRIAL FIBRILLATION, PERMANENT: ICD-10-CM

## 2024-03-05 DIAGNOSIS — R06.09 DYSPNEA ON EXERTION: ICD-10-CM

## 2024-03-05 DIAGNOSIS — I27.20 PULMONARY HYPERTENSION: ICD-10-CM

## 2024-03-05 DIAGNOSIS — I34.0 NONRHEUMATIC MITRAL VALVE REGURGITATION: ICD-10-CM

## 2024-03-05 DIAGNOSIS — I36.1 NONRHEUMATIC TRICUSPID VALVE REGURGITATION: Primary | ICD-10-CM

## 2024-03-05 DIAGNOSIS — J43.2 CENTRILOBULAR EMPHYSEMA: ICD-10-CM

## 2024-03-05 DIAGNOSIS — I70.0 ATHEROSCLEROSIS OF AORTA: ICD-10-CM

## 2024-03-05 DIAGNOSIS — R79.89 ELEVATED BRAIN NATRIURETIC PEPTIDE (BNP) LEVEL: ICD-10-CM

## 2024-03-05 DIAGNOSIS — Z79.01 ANTICOAGULATED: ICD-10-CM

## 2024-03-05 DIAGNOSIS — R94.31 ABNORMAL ECG: ICD-10-CM

## 2024-03-05 PROCEDURE — 99999 PR PBB SHADOW E&M-EST. PATIENT-LVL II: CPT | Mod: PBBFAC,HCNC,, | Performed by: INTERNAL MEDICINE

## 2024-03-05 PROCEDURE — 1160F RVW MEDS BY RX/DR IN RCRD: CPT | Mod: HCNC,CPTII,S$GLB, | Performed by: INTERNAL MEDICINE

## 2024-03-05 PROCEDURE — 99214 OFFICE O/P EST MOD 30 MIN: CPT | Mod: HCNC,S$GLB,, | Performed by: INTERNAL MEDICINE

## 2024-03-05 PROCEDURE — 1126F AMNT PAIN NOTED NONE PRSNT: CPT | Mod: HCNC,CPTII,S$GLB, | Performed by: INTERNAL MEDICINE

## 2024-03-05 PROCEDURE — 1159F MED LIST DOCD IN RCRD: CPT | Mod: HCNC,CPTII,S$GLB, | Performed by: INTERNAL MEDICINE

## 2024-03-05 RX ORDER — FUROSEMIDE 20 MG/1
20 TABLET ORAL DAILY
Qty: 30 TABLET | Refills: 11
Start: 2024-03-05 | End: 2026-02-23

## 2024-03-05 NOTE — PROGRESS NOTES
Subjective:    Patient ID:  Destini Augustine is a 89 y.o. female who presents for evaluation of Atrial Fibrillation, Shortness of Breath, Congestive Heart Failure, and Valvular Heart Disease        HPI: Pt presents for eval.  Her current med conditions include permanent a fib, PHTN, AI, DD, RV failure, COPD, TR, MR, aortic valve sclerosis.  Nonsmoker.  Past hx pertinent for following:  F/u by Dr Gibson, Cardiology, who retired.  Echo 2016 normal EF, aortic valve sclerosis, mild TR, mild-mod TR, PAP 46 mmHg.  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  ecg May 2022 a fib w controlled VR, left axis, chronic septal infarct.  Pt admitted May 2023 with CP, COPD exacerbation.  Ecg showed no acute ischemia.  -  troponin levels.  BNP was elevated, 302 (no old to compare).  Tx for COPD exacerbation and improved.  Dr. Taylor, Cards, evaluated pt.  Echo May 2023:  normal LVEF, DD, moderate RVE with moderate RV dysfunction, LAE, mod MR, mild AI, mod-severe TR, PAP 87 mmHg.  Now here.  No angina.  Stable JACKSON.  No pnd/orthopnea.  No falls.  No syncope.  Has not had to take Lasix -- prn basis.  BP stable.  BNP elevated, overall stable range.  Echo Feb 2024: normal EF, DD, severe RVE, moderate RV dysfunction, LAE, KATHY, mild AI, mod-severe MR, mod-severe TR, PAP 76 mmHg.          Past Medical History:   Diagnosis Date    Arthritis     knees, hands    Atherosclerosis of aorta 02/11/2016    X-ray lumbar spine 9/21/2006    Atrial fibrillation     Atrial fibrillation, permanent 05/01/2017    Basal cell carcinoma     face, right thigh    Chronic diastolic heart failure 05/29/2023    COAG (chronic open-angle glaucoma) - Both Eyes 08/28/2013    COPD exacerbation 05/27/2023    Ex-smoker     Hamartoma     mandible    Macular degeneration     Nonrheumatic mitral valve regurgitation 05/24/2022    Nonrheumatic tricuspid valve regurgitation 05/24/2022    Osteopenia     7/14 shama 7/16    Pulmonary heart disease   "   Pulmonary hypertension 05/29/2023    RVF (right ventricular failure) 03/05/2024    Scapholunate ligament injury with no instability     Scoliosis 11/30/2015    lumbar x-ray     Current Outpatient Medications   Medication Instructions    ELIQUIS 2.5 mg, Oral, 2 times daily    furosemide (LASIX) 20 mg, Oral, Daily    latanoprost 0.005 % ophthalmic solution INSTILL 1 DROP INTO BOTH EYES EVERY NIGHT    metoprolol succinate (TOPROL-XL) 25 mg, Oral    multivitamin (THERAGRAN) per tablet 1 tablet, Oral, Daily    timolol maleate 0.5% (TIMOPTIC) 0.5 % Drop 1 drop, Right Eye, Every morning    tiotropium-olodateroL (STIOLTO RESPIMAT) 2.5-2.5 mcg/actuation Mist 1 puff, Inhalation, Daily, Controller    turmeric root extract 500 mg, Oral, Daily         Review of Systems   Constitutional: Negative.   HENT: Negative.     Eyes: Negative.    Cardiovascular:  Positive for chest pain and dyspnea on exertion.   Respiratory:  Positive for shortness of breath.    Endocrine: Negative.    Hematologic/Lymphatic: Negative.    Skin: Negative.    Musculoskeletal:  Positive for arthritis.   Gastrointestinal: Negative.    Genitourinary: Negative.    Neurological:  Positive for weakness.   Psychiatric/Behavioral: Negative.     Allergic/Immunologic: Negative.        BP (!) 140/80 (BP Location: Right arm, Patient Position: Sitting, BP Method: Large (Manual))   Pulse 85   Ht 5' 7" (1.702 m)   Wt 59.7 kg (131 lb 9.8 oz)   SpO2 95%   BMI 20.61 kg/m²     Wt Readings from Last 3 Encounters:   03/05/24 59.7 kg (131 lb 9.8 oz)   02/20/24 59.4 kg (131 lb)   10/31/23 59.7 kg (131 lb 9.8 oz)     Temp Readings from Last 3 Encounters:   08/21/23 98.1 °F (36.7 °C) (Tympanic)   05/28/23 99.1 °F (37.3 °C) (Oral)   05/15/23 98.6 °F (37 °C) (Tympanic)     BP Readings from Last 3 Encounters:   03/05/24 (!) 140/80   02/20/24 134/75   10/31/23 134/75     Pulse Readings from Last 3 Encounters:   03/05/24 85   10/31/23 84   09/13/23 81          Objective:    " Physical Exam  Vitals and nursing note reviewed.   Constitutional:       General: She is not in acute distress.     Appearance: Normal appearance. She is well-developed. She is not ill-appearing or diaphoretic.   HENT:      Head: Normocephalic.   Neck:      Thyroid: No thyromegaly.      Vascular: No carotid bruit or JVD.   Cardiovascular:      Rate and Rhythm: Normal rate. Rhythm irregularly irregular.      Pulses: Normal pulses.           Radial pulses are 2+ on the right side and 2+ on the left side.      Heart sounds: S1 normal and S2 normal. Murmur heard.      Medium-pitched midsystolic murmur is present with a grade of 2/6 at the upper left sternal border.      No friction rub. No gallop.   Pulmonary:      Effort: Pulmonary effort is normal.      Breath sounds: Normal breath sounds. No wheezing or rales.   Abdominal:      General: Bowel sounds are normal. There is no abdominal bruit.      Palpations: Abdomen is soft.      Tenderness: There is no abdominal tenderness.   Musculoskeletal:      Cervical back: Neck supple.      Right lower leg: Edema (trace) present.      Left lower leg: Edema (trace) present.   Lymphadenopathy:      Cervical: No cervical adenopathy.   Skin:     General: Skin is warm.   Neurological:      Mental Status: She is alert and oriented to person, place, and time.   Psychiatric:         Behavior: Behavior normal. Behavior is cooperative.       I have reviewed all pertinent labs and cardiac studies.    Component      Latest Ref Rng 2/20/2024   BNP      0 - 99 pg/mL 316 (H)       Legend:  (H) High      Component      Latest Ref Rng 8/15/2023   BNP      0 - 99 pg/mL 250 (H)       Legend:  (H) High    Component      Latest Ref Rng & Units 5/27/2023   BNP      0 - 99 pg/mL 302 (H)       Chemistry        Component Value Date/Time     02/20/2024 1217    K 4.6 02/20/2024 1217     02/20/2024 1217    CO2 28 02/20/2024 1217    BUN 18 02/20/2024 1217    CREATININE 0.7 02/20/2024 1217    GLU  "88 02/20/2024 1217        Component Value Date/Time    CALCIUM 10.3 02/20/2024 1217    ALKPHOS 58 02/20/2024 1217    AST 23 02/20/2024 1217    ALT 23 02/20/2024 1217    BILITOT 0.9 02/20/2024 1217    ESTGFRAFRICA >60.0 08/12/2021 0956    EGFRNONAA >60.0 08/12/2021 0956        Lab Results   Component Value Date    WBC 3.91 08/15/2023    HGB 14.0 08/15/2023    HCT 44.3 08/15/2023    MCV 93 08/15/2023     (L) 08/15/2023       Lab Results   Component Value Date    TSH 1.055 10/05/2017     No results found for: "LABA1C", "HGBA1C"  Lab Results   Component Value Date    CHOL 133 08/15/2023    CHOL 140 08/12/2021    CHOL 149 10/05/2017     Lab Results   Component Value Date    HDL 57 08/15/2023    HDL 66 08/12/2021    HDL 68 10/05/2017     Lab Results   Component Value Date    LDLCALC 69.8 08/15/2023    LDLCALC 61.2 (L) 08/12/2021    LDLCALC 68.4 10/05/2017     Lab Results   Component Value Date    TRIG 31 08/15/2023    TRIG 64 08/12/2021    TRIG 63 10/05/2017     Lab Results   Component Value Date    CHOLHDL 42.9 08/15/2023    CHOLHDL 47.1 08/12/2021    CHOLHDL 45.6 10/05/2017         Results for orders placed during the hospital encounter of 05/16/23    Echo    Interpretation Summary  · The left ventricle is normal in size with concentric remodeling and normal systolic function.  · Severe left atrial enlargement.  · Indeterminate left ventricular diastolic function.  · The estimated PA systolic pressure is 87 mmHg.  · Moderate right ventricular enlargement with moderately reduced right ventricular systolic function.  · There is pulmonary hypertension.  · Elevated central venous pressure (15 mmHg).  · The estimated ejection fraction is 60%.  · There is abnormal septal wall motion. There is systolic flattening of the interventricular septum consistent with right ventricle pressure overload.  · Severe right atrial enlargement.  · Mild aortic regurgitation.  · Moderate mitral regurgitation.  · Moderate to severe " tricuspid regurgitation.  · Mild pulmonic regurgitation.          Results for orders placed during the hospital encounter of 02/20/24    Echo    Interpretation Summary    Left Ventricle: The left ventricle is normal in size. Normal wall thickness. There is concentric remodeling. There is normal systolic function with a visually estimated ejection fraction of 55 - 60%. Unable to assess diastolic function due to atrial fibrillation.    Right Ventricle: Severe right ventricular enlargement. Wall thickness is normal. Right ventricle wall motion has global hypokinesis. Systolic function is moderately reduced.    Left Atrium: Left atrium is severely dilated.    Right Atrium: Right atrium is severely dilated.    Aortic Valve: There is mild aortic regurgitation.    Mitral Valve: Moderately thickened leaflets. Mildly calcified leaflets. There is moderate mitral annular calcification present. There is moderate to severe regurgitation.    Tricuspid Valve: There is moderate to severe regurgitation.    Pulmonic Valve: There is mild to moderate regurgitation.    Pulmonary Artery: There is pulmonary hypertension. The estimated pulmonary artery systolic pressure is 76 mmHg.    IVC/SVC: Elevated venous pressure at 15 mmHg.        Assessment:       1. Nonrheumatic tricuspid valve regurgitation    2. Chronic diastolic heart failure    3. Atrial fibrillation, permanent    4. Atherosclerosis of aorta    5. Pulmonary hypertension    6. Centrilobular emphysema    7. Abnormal ECG    8. Anticoagulated    9. Dyspnea on exertion    10. Elevated brain natriuretic peptide (BNP) level    11. Nonrheumatic mitral valve regurgitation    12. RVF (right ventricular failure)         Plan:               Stable CV status on current med tx.  Diastolic CHF: Compensated.  Low salt diet.   Add Lasix 20 mg daily basis for CHF sxs.  CMP + BNP in 2 weeks.  Pt counseled on CHF mgt.  Daily weights.  Low salt diet.  COPD: F/u w Pulmonary as scheduled.  RV  failure: likely due  to severe COPD, diastolic CHF, valvular heart disease.  Permanent a fib: rate control strategy. Continue Eliquis bid.  PHTN: due to COPD, diastolic CHF.  F/u w Pulmonary.  F/u echo in future.  Valvular heart conditions: monitor. F/u echo in future.  Reviewed all tests and above medical conditions with patient in detail and formulated treatment plan.  Continue optimal medical treatment for cardiovascular conditions.  Cardiac low salt diet advised.  Daily exercise as tolerated.  Maintaining healthy weight and weight loss goals (if needed) were discussed in clinic.  Need for BP control and HTN goals (if needed) were discussed and tx plan formulated.  Importance of optimal lipid control were discussed in detail as well as possible pharmacologic and lifestyle changes that may be needed.  Abnl ecg: chronic abnormalities. monitor.    F/u in 4 months.      I have reviewed all pertinent labs and cardiac studies independently. Plans and recommendations have been formulated under my direct supervision. All questions answered and patient voiced understanding.

## 2024-03-11 ENCOUNTER — OFFICE VISIT (OUTPATIENT)
Dept: OPHTHALMOLOGY | Facility: CLINIC | Age: 89
End: 2024-03-11
Payer: MEDICARE

## 2024-03-11 DIAGNOSIS — H35.3131 EARLY DRY STAGE NONEXUDATIVE AGE-RELATED MACULAR DEGENERATION OF BOTH EYES: ICD-10-CM

## 2024-03-11 DIAGNOSIS — H04.129 DRY EYE: ICD-10-CM

## 2024-03-11 DIAGNOSIS — Z96.1 PSEUDOPHAKIA OF BOTH EYES: ICD-10-CM

## 2024-03-11 DIAGNOSIS — H40.1132 PRIMARY OPEN ANGLE GLAUCOMA OF BOTH EYES, MODERATE STAGE: Primary | ICD-10-CM

## 2024-03-11 PROCEDURE — 1159F MED LIST DOCD IN RCRD: CPT | Mod: HCNC,CPTII,S$GLB, | Performed by: OPHTHALMOLOGY

## 2024-03-11 PROCEDURE — 92083 EXTENDED VISUAL FIELD XM: CPT | Mod: HCNC,S$GLB,, | Performed by: OPHTHALMOLOGY

## 2024-03-11 PROCEDURE — 99214 OFFICE O/P EST MOD 30 MIN: CPT | Mod: HCNC,S$GLB,, | Performed by: OPHTHALMOLOGY

## 2024-03-11 PROCEDURE — 99999 PR PBB SHADOW E&M-EST. PATIENT-LVL II: CPT | Mod: PBBFAC,HCNC,, | Performed by: OPHTHALMOLOGY

## 2024-03-11 PROCEDURE — 1160F RVW MEDS BY RX/DR IN RCRD: CPT | Mod: HCNC,CPTII,S$GLB, | Performed by: OPHTHALMOLOGY

## 2024-03-11 PROCEDURE — 1126F AMNT PAIN NOTED NONE PRSNT: CPT | Mod: HCNC,CPTII,S$GLB, | Performed by: OPHTHALMOLOGY

## 2024-03-11 NOTE — PROGRESS NOTES
SUBJECTIVE  Destini Augustine is 89 y.o. female  Uncorrected distance visual acuity was 20/20 -2 in the right eye and 20/30 +1 in the left eye.   Chief Complaint   Patient presents with    Glaucoma     4 month POAG HVF 24-2 and Dilation. VA is doing well, no changes. No pain or irritation. Compliant with gtts.          HPI     Glaucoma     Additional comments: 4 month POAG HVF 24-2 and Dilation. VA is doing   well, no changes. No pain or irritation. Compliant with gtts.           Comments    1. Mod COAG OD>OS (Initial 30/26) goal = 17   2. PCIOL OD w/ ECP & Toric IOL 5/20/09   PCIOL OS w/ ECP & Toric IOL 4/22/09   3. Mild Dry AMD   4. PCO OS   5. Pseudophakia OU      Latanoprost QHS OU   Timolol BID OD     Ocuvite   HAG (does not do)   O3FO  WC/LS             Last edited by Isidoro Saini on 3/11/2024 12:57 PM.         Assessment /Plan :  1. Primary open angle glaucoma of both eyes, moderate stage Doing well, intraocular pressure (IOP) within acceptable range relative to target IOP and no evidence of progression. Continue current treatment. Reviewed importance of continued compliance with treatment and follow up.      Patient instructed to continue using the following glaucoma medication as follows:  Latanoprost one drop in each eye nightly and Timolol one drop in the right eye every 12 hours    Return to clinic in 6 months  or as needed.  With IOP Check and GOCT     2. Early dry stage nonexudative age-related macular degeneration of both eyes -  recommend AREDS vitamin supplementation and Amsler grid monitoring.     3. Pseudophakia of both eyes  -- Condition stable, no therapeutic change required. Monitoring routinely.     4. Dry eye  -- Condition stable, no therapeutic change required. Monitoring routinely.

## 2024-03-19 ENCOUNTER — LAB VISIT (OUTPATIENT)
Dept: LAB | Facility: HOSPITAL | Age: 89
End: 2024-03-19
Attending: INTERNAL MEDICINE
Payer: MEDICARE

## 2024-03-19 DIAGNOSIS — I50.32 CHRONIC DIASTOLIC HEART FAILURE: ICD-10-CM

## 2024-03-19 LAB
ALBUMIN SERPL BCP-MCNC: 4.3 G/DL (ref 3.5–5.2)
ALP SERPL-CCNC: 66 U/L (ref 55–135)
ALT SERPL W/O P-5'-P-CCNC: 16 U/L (ref 10–44)
ANION GAP SERPL CALC-SCNC: 10 MMOL/L (ref 8–16)
AST SERPL-CCNC: 25 U/L (ref 10–40)
BILIRUB SERPL-MCNC: 0.8 MG/DL (ref 0.1–1)
BUN SERPL-MCNC: 23 MG/DL (ref 8–23)
CALCIUM SERPL-MCNC: 10 MG/DL (ref 8.7–10.5)
CHLORIDE SERPL-SCNC: 100 MMOL/L (ref 95–110)
CO2 SERPL-SCNC: 27 MMOL/L (ref 23–29)
CREAT SERPL-MCNC: 0.8 MG/DL (ref 0.5–1.4)
EST. GFR  (NO RACE VARIABLE): >60 ML/MIN/1.73 M^2
GLUCOSE SERPL-MCNC: 79 MG/DL (ref 70–110)
POTASSIUM SERPL-SCNC: 4.2 MMOL/L (ref 3.5–5.1)
PROT SERPL-MCNC: 7.4 G/DL (ref 6–8.4)
SODIUM SERPL-SCNC: 137 MMOL/L (ref 136–145)

## 2024-03-19 PROCEDURE — 80053 COMPREHEN METABOLIC PANEL: CPT | Mod: HCNC | Performed by: INTERNAL MEDICINE

## 2024-03-19 PROCEDURE — 36415 COLL VENOUS BLD VENIPUNCTURE: CPT | Mod: HCNC | Performed by: INTERNAL MEDICINE

## 2024-03-19 PROCEDURE — 83880 ASSAY OF NATRIURETIC PEPTIDE: CPT | Mod: HCNC | Performed by: INTERNAL MEDICINE

## 2024-03-20 LAB — BNP SERPL-MCNC: 221 PG/ML (ref 0–99)

## 2024-03-27 ENCOUNTER — TELEPHONE (OUTPATIENT)
Dept: INTERNAL MEDICINE | Facility: CLINIC | Age: 89
End: 2024-03-27
Payer: MEDICARE

## 2024-03-27 DIAGNOSIS — Z12.31 ENCOUNTER FOR SCREENING MAMMOGRAM FOR MALIGNANT NEOPLASM OF BREAST: Primary | ICD-10-CM

## 2024-03-27 NOTE — TELEPHONE ENCOUNTER
----- Message from Marissa Mcallister sent at 3/27/2024 11:24 AM CDT -----  Type:  Patient Requesting Referral    Who Called:pt  Does the patient already have the specialty appointment scheduled?:no  If yes, what is the date of that appointment?:n/a  Referral to What Specialty:Mammo  Reason for Referral:Annual  Does the patient want the referral with a specific physician?:yes  [facility]  Patient Requesting a Response?:yes  Would the patient rather a call back or a response via [facility]? call  Best Call Back Number: [phone]  Additional Information:

## 2024-03-27 NOTE — TELEPHONE ENCOUNTER
Spoke with the patient and scheduled her yearly mammo. The patient mammo was scheduled on 4/24/24 at 11:40 am at the [facility]. The patient stated understanding. Reminder notice mailed out to the patient.

## 2024-03-27 NOTE — TELEPHONE ENCOUNTER
----- Message from Marissa Mcallister sent at 3/27/2024 11:24 AM CDT -----  Type:  Patient Requesting Referral    Who Called:pt  Does the patient already have the specialty appointment scheduled?:no  If yes, what is the date of that appointment?:n/a  Referral to What Specialty:Mammo  Reason for Referral:Annual  Does the patient want the referral with a specific physician?:yes  [facility]  Patient Requesting a Response?:yes  Would the patient rather a call back or a response via [facility]? call  Best Call Back Number: [phone]  Additional Information:

## 2024-04-22 ENCOUNTER — OFFICE VISIT (OUTPATIENT)
Dept: HOME HEALTH SERVICES | Facility: CLINIC | Age: 89
End: 2024-04-22
Payer: MEDICARE

## 2024-04-22 VITALS
HEIGHT: 67 IN | BODY MASS INDEX: 19.93 KG/M2 | SYSTOLIC BLOOD PRESSURE: 110 MMHG | DIASTOLIC BLOOD PRESSURE: 60 MMHG | RESPIRATION RATE: 18 BRPM | HEART RATE: 87 BPM | WEIGHT: 127 LBS | TEMPERATURE: 98 F | OXYGEN SATURATION: 93 %

## 2024-04-22 DIAGNOSIS — I50.810 RVF (RIGHT VENTRICULAR FAILURE): ICD-10-CM

## 2024-04-22 DIAGNOSIS — M19.032 ARTHRITIS OF LEFT WRIST: ICD-10-CM

## 2024-04-22 DIAGNOSIS — D68.69 OTHER THROMBOPHILIA: ICD-10-CM

## 2024-04-22 DIAGNOSIS — J43.9 PULMONARY EMPHYSEMA, UNSPECIFIED EMPHYSEMA TYPE: ICD-10-CM

## 2024-04-22 DIAGNOSIS — E04.2 MULTIPLE THYROID NODULES: ICD-10-CM

## 2024-04-22 DIAGNOSIS — J44.1 COPD EXACERBATION: ICD-10-CM

## 2024-04-22 DIAGNOSIS — M17.11 PRIMARY OSTEOARTHRITIS OF RIGHT KNEE: ICD-10-CM

## 2024-04-22 DIAGNOSIS — I27.20 PULMONARY HYPERTENSION: ICD-10-CM

## 2024-04-22 DIAGNOSIS — I27.9 PULMONARY HEART DISEASE: ICD-10-CM

## 2024-04-22 DIAGNOSIS — M85.80 OSTEOPENIA, UNSPECIFIED LOCATION: ICD-10-CM

## 2024-04-22 DIAGNOSIS — H35.3110 NONEXUDATIVE AGE-RELATED MACULAR DEGENERATION OF RIGHT EYE, UNSPECIFIED STAGE: ICD-10-CM

## 2024-04-22 DIAGNOSIS — I50.32 CHRONIC DIASTOLIC HEART FAILURE: ICD-10-CM

## 2024-04-22 DIAGNOSIS — R94.31 ABNORMAL ECG: ICD-10-CM

## 2024-04-22 DIAGNOSIS — Z79.01 ANTICOAGULATED: ICD-10-CM

## 2024-04-22 DIAGNOSIS — I70.0 ATHEROSCLEROSIS OF AORTA: ICD-10-CM

## 2024-04-22 DIAGNOSIS — H40.1132 PRIMARY OPEN ANGLE GLAUCOMA OF BOTH EYES, MODERATE STAGE: Primary | ICD-10-CM

## 2024-04-22 DIAGNOSIS — S69.91XD INJURY OF RIGHT SCAPHOLUNATE LIGAMENT WITH NO INSTABILITY, SUBSEQUENT ENCOUNTER: ICD-10-CM

## 2024-04-22 DIAGNOSIS — I36.1 NONRHEUMATIC TRICUSPID VALVE REGURGITATION: ICD-10-CM

## 2024-04-22 DIAGNOSIS — R06.00 DYSPNEA, UNSPECIFIED TYPE: ICD-10-CM

## 2024-04-22 DIAGNOSIS — I48.21 ATRIAL FIBRILLATION, PERMANENT: Chronic | ICD-10-CM

## 2024-04-22 PROBLEM — M25.532 LEFT WRIST PAIN: Status: RESOLVED | Noted: 2022-01-18 | Resolved: 2024-04-22

## 2024-04-22 PROBLEM — G89.29 CHRONIC PAIN OF RIGHT KNEE: Status: RESOLVED | Noted: 2019-11-22 | Resolved: 2024-04-22

## 2024-04-22 PROBLEM — R07.9 CHEST PAIN: Status: RESOLVED | Noted: 2023-05-27 | Resolved: 2024-04-22

## 2024-04-22 PROBLEM — M25.561 CHRONIC PAIN OF RIGHT KNEE: Status: RESOLVED | Noted: 2019-11-22 | Resolved: 2024-04-22

## 2024-04-22 PROCEDURE — G0438 PPPS, INITIAL VISIT: HCPCS | Mod: S$GLB,,, | Performed by: NURSE PRACTITIONER

## 2024-04-22 PROCEDURE — 1160F RVW MEDS BY RX/DR IN RCRD: CPT | Mod: CPTII,S$GLB,, | Performed by: NURSE PRACTITIONER

## 2024-04-22 PROCEDURE — 1159F MED LIST DOCD IN RCRD: CPT | Mod: CPTII,S$GLB,, | Performed by: NURSE PRACTITIONER

## 2024-04-22 PROCEDURE — 1126F AMNT PAIN NOTED NONE PRSNT: CPT | Mod: CPTII,S$GLB,, | Performed by: NURSE PRACTITIONER

## 2024-04-22 NOTE — PROGRESS NOTES
"  Destini Augustine presented for an initial Medicare AWV today. The following components were reviewed and updated:    Medical history  Family History  Social history  Allergies and Current Medications  Health Risk Assessment  Health Maintenance  Care Team    See Completed Assessments for Annual Wellness visit with in the encounter summary    The following assessments were completed:  Depression Screening  Cognitive function Screening  Timed Get Up Test  Whisper Test      Opioid documentation:      Patient does not have a current opioid prescription.          Vitals:    04/22/24 1430   BP: 110/60   Pulse: 87   Resp: 18   Temp: 98.4 °F (36.9 °C)   SpO2: (!) 93%   Weight: 57.6 kg (127 lb)   Height: 5' 7" (1.702 m)     Body mass index is 19.89 kg/m².       Physical Exam  Vitals reviewed.   Constitutional:       General: She is not in acute distress.     Appearance: Normal appearance. She is normal weight. She is not ill-appearing.   HENT:      Head: Normocephalic and atraumatic.      Nose: Nose normal.      Mouth/Throat:      Mouth: Mucous membranes are moist.      Pharynx: Oropharynx is clear.   Eyes:      Pupils: Pupils are equal, round, and reactive to light.   Cardiovascular:      Rate and Rhythm: Normal rate. Rhythm irregular.      Pulses: Normal pulses.      Heart sounds: Normal heart sounds.   Pulmonary:      Effort: Pulmonary effort is normal.      Breath sounds: Decreased breath sounds present.   Abdominal:      General: Bowel sounds are normal.      Palpations: Abdomen is soft.   Musculoskeletal:         General: Normal range of motion.      Cervical back: Neck supple.      Right lower leg: No edema.      Left lower leg: No edema.   Skin:     General: Skin is warm and dry.      Capillary Refill: Capillary refill takes less than 2 seconds.   Neurological:      Mental Status: She is alert and oriented to person, place, and time.   Psychiatric:         Mood and Affect: Mood normal.         Behavior: Behavior normal.  "        Thought Content: Thought content normal.         Judgment: Judgment normal.           Diagnoses and health risks identified today and associated recommendations/orders:  1. Primary open angle glaucoma of both eyes, moderate stage  Chronic and stable. Continue current management with prescription eye drops. Follow up with Ophthalmology as instructed..     2. Nonexudative age-related macular degeneration of right eye, unspecified stage  Chronic and stable. Follow up with Ophthalmology as instructed.     3. COPD   Chronic and stable. Continue current management with tiotropium-olodaterol 2.5-2.5mcg 1 puff daily. See med list above. Follow up with PCP/Pulmonology as directed.     4. Dyspnea, unspecified type  Chronic and stable. Continue current management with tiotropium-olodaterol 2.5-2.5mcg 1 puff daily. See med list above. Follow up with PCP/Pulmonology as directed.    5. Pulmonary emphysema, unspecified emphysema type  Chronic and stable. Continue current management with tiotropium-olodaterol 2.5-2.5mcg 1 puff daily. See med list above. Follow up with PCP/Pulmonology as directed.    6. RVF (right ventricular failure)  Chronic and stable. Continue current management.  Follow up with Cardiology as instructed.    7. Pulmonary hypertension  Chronic and stable. Continue current management. Follow up with Cardiology as instructed.     8. Pulmonary heart disease  Chronic and stable. Continue current management. Follow up with Cardiology as instructed.    9. Nonrheumatic tricuspid valve regurgitation  Chronic and stable. Continue current management. Follow up with Cardiology as instructed.    10. Chronic diastolic heart failure  Chronic and stable. Continue current management with furosemide 20mg PO QD. Follow up with Cardiology as instructed.     11. Atrial fibrillation, permanent  Chronic and stable. Continue current management with Eliquis 2.5mg PO BID and metoprolol 25mg PO QD. Follow up with Cardiology as  instructed.     12. Atherosclerosis of aorta  Chronic and stable. Continue current management. Follow up with Cardiology as instructed.     13. Abnormal ECG  Chronic and stable. Continue current management. Follow up with Cardiology as instructed.     14. 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 Continue current management with Eliquis 2.5 mg PO BID. Follow up with Cardiology as instructed.    15. Anticoagulated  Chronic and stable. Continue current management with Eliquis 2.5mg PO BID.Follow up with Cardiology as instructed.     16. Multiple thyroid nodules  Chronic and stable. Follow up with PCP as instructed..     17. Injury of right scapholunate ligament with no instability, subsequent encounter  Chronic and stable. Follow up with PCP as needed     18. Primary osteoarthritis of right knee  Chronic and stable. Continue current management.  Follow up with PCP/Orthopedics as needed.    19. Arthritis of left wrist  Chronic and stable. Continue current management.  Follow up with PCP/Orthopedics as needed.    20. Osteopenia, unspecified location  Chronic and stable.  Follow up with PCP as instructed.      Provided Destini with a 5-10 year written screening schedule and personal prevention plan. Recommendations were developed using the USPSTF age appropriate recommendations. Education, counseling, and referrals were provided as needed.  After Visit Summary printed and given to patient which includes a list of additional screenings\tests needed.    I offered to discuss advanced care planning, including how to pick a person who would make decisions for you if you were unable to make them for yourself, called a health care power of , and what kind of decisions you might make such as use of life sustaining treatments such as ventilators and tube feeding when faced with a life limiting illness recorded on a living will that they will need to know. (How you want to be cared for as you near the end of your natural  life)     Patient states that she has advanced directives and her son has the documents.      No follow-ups on file.      Anna Gilbert NP

## 2024-04-24 ENCOUNTER — HOSPITAL ENCOUNTER (OUTPATIENT)
Dept: RADIOLOGY | Facility: HOSPITAL | Age: 89
Discharge: HOME | End: 2024-04-24
Attending: FAMILY MEDICINE
Payer: MEDICARE

## 2024-04-24 DIAGNOSIS — Z12.31 ENCOUNTER FOR SCREENING MAMMOGRAM FOR MALIGNANT NEOPLASM OF BREAST: ICD-10-CM

## 2024-04-24 PROCEDURE — 77067 SCR MAMMO BI INCL CAD: CPT | Mod: TC

## 2024-04-24 PROCEDURE — 77063 BREAST TOMOSYNTHESIS BI: CPT | Mod: 26,,, | Performed by: RADIOLOGY

## 2024-04-24 PROCEDURE — 77067 SCR MAMMO BI INCL CAD: CPT | Mod: 26,,, | Performed by: RADIOLOGY

## 2024-05-30 DIAGNOSIS — J43.2 CENTRILOBULAR EMPHYSEMA: ICD-10-CM

## 2024-05-30 RX ORDER — TIOTROPIUM BROMIDE AND OLODATEROL 3.124; 2.736 UG/1; UG/1
1 SPRAY, METERED RESPIRATORY (INHALATION)
Qty: 4 G | Refills: 0 | Status: SHIPPED | OUTPATIENT
Start: 2024-05-30

## 2024-07-11 ENCOUNTER — TELEPHONE (OUTPATIENT)
Dept: CARDIOLOGY | Facility: CLINIC | Age: 89
End: 2024-07-11
Payer: MEDICARE

## 2024-07-11 DIAGNOSIS — Z76.89 ENCOUNTER TO ESTABLISH CARE: Primary | ICD-10-CM

## 2024-07-12 ENCOUNTER — HOSPITAL ENCOUNTER (OUTPATIENT)
Dept: CARDIOLOGY | Facility: HOSPITAL | Age: 89
Discharge: HOME | End: 2024-07-12
Attending: INTERNAL MEDICINE
Payer: MEDICARE

## 2024-07-12 ENCOUNTER — OFFICE VISIT (OUTPATIENT)
Dept: CARDIOLOGY | Facility: CLINIC | Age: 89
End: 2024-07-12
Payer: MEDICARE

## 2024-07-12 VITALS
BODY MASS INDEX: 20.28 KG/M2 | DIASTOLIC BLOOD PRESSURE: 65 MMHG | SYSTOLIC BLOOD PRESSURE: 100 MMHG | HEIGHT: 67 IN | HEART RATE: 85 BPM | WEIGHT: 129.19 LBS | OXYGEN SATURATION: 97 %

## 2024-07-12 DIAGNOSIS — I50.32 CHRONIC DIASTOLIC HEART FAILURE: Primary | ICD-10-CM

## 2024-07-12 DIAGNOSIS — I27.20 PULMONARY HYPERTENSION: ICD-10-CM

## 2024-07-12 DIAGNOSIS — I70.0 ATHEROSCLEROSIS OF AORTA: ICD-10-CM

## 2024-07-12 DIAGNOSIS — I48.21 ATRIAL FIBRILLATION, PERMANENT: Chronic | ICD-10-CM

## 2024-07-12 DIAGNOSIS — I36.1 NONRHEUMATIC TRICUSPID VALVE REGURGITATION: ICD-10-CM

## 2024-07-12 DIAGNOSIS — I34.0 NONRHEUMATIC MITRAL VALVE REGURGITATION: ICD-10-CM

## 2024-07-12 DIAGNOSIS — R94.31 ABNORMAL ECG: ICD-10-CM

## 2024-07-12 DIAGNOSIS — Z79.01 ANTICOAGULATED: ICD-10-CM

## 2024-07-12 DIAGNOSIS — I27.9 PULMONARY HEART DISEASE: ICD-10-CM

## 2024-07-12 DIAGNOSIS — I50.810 RVF (RIGHT VENTRICULAR FAILURE): ICD-10-CM

## 2024-07-12 DIAGNOSIS — Z76.89 ENCOUNTER TO ESTABLISH CARE: ICD-10-CM

## 2024-07-12 LAB
OHS QRS DURATION: 108 MS
OHS QTC CALCULATION: 422 MS

## 2024-07-12 PROCEDURE — 93005 ELECTROCARDIOGRAM TRACING: CPT

## 2024-07-12 PROCEDURE — 93010 ELECTROCARDIOGRAM REPORT: CPT | Mod: ,,, | Performed by: INTERNAL MEDICINE

## 2024-07-12 PROCEDURE — 99999 PR PBB SHADOW E&M-EST. PATIENT-LVL II: CPT | Mod: PBBFAC,,, | Performed by: INTERNAL MEDICINE

## 2024-07-12 NOTE — PROGRESS NOTES
Subjective:    Patient ID:  Destini Augustine is a 89 y.o. female who presents for evaluation of Atrial Fibrillation and Congestive Heart Failure        HPI: Pt presents for eval.  Her current med conditions include permanent a fib, PHTN, AI, DD, RV failure, COPD, TR, MR, aortic valve sclerosis.  Nonsmoker.  Past hx pertinent for following:  F/u by Dr Gibson, Cardiology, who retired.  Echo 2016 normal EF, aortic valve sclerosis, mild TR, mild-mod TR, PAP 46 mmHg.  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  ecg May 2022 a fib w controlled VR, left axis, chronic septal infarct.  Pt admitted May 2023 with CP, COPD exacerbation.  Ecg showed no acute ischemia.  -  troponin levels.  BNP was elevated, 302 (no old to compare).  Tx for COPD exacerbation and improved.  Dr. Taylor, Cards, evaluated pt.  Echo May 2023:  normal LVEF, DD, moderate RVE with moderate RV dysfunction, LAE, mod MR, mild AI, mod-severe TR, PAP 87 mmHg.  Echo Feb 2024: normal EF, DD, severe RVE, moderate RV dysfunction, LAE, KATHY, mild AI, mod-severe MR, mod-severe TR, PAP 76 mmHg.  Now here for 4 month f/u appt.  Ecg today 7/12/24 personally reviewed: a fib with controlled VR, left axis, chronic septal infarct.  No acute changes noted.  No angina.  Stable JACKSON.  No pnd/orthopnea.  On COPD tx.  No falls.  No syncope.  On lasix qd.  BP stable.  BNP stable range.  Compliant w meds.      Past Medical History:   Diagnosis Date    Arthritis     knees, hands    Atherosclerosis of aorta 02/11/2016    X-ray lumbar spine 9/21/2006    Atrial fibrillation     Atrial fibrillation, permanent 05/01/2017    Basal cell carcinoma     face, right thigh    Chronic diastolic heart failure 05/29/2023    COAG (chronic open-angle glaucoma) - Both Eyes 08/28/2013    COPD exacerbation 05/27/2023    Ex-smoker     Ham[place]     mandible    Macular degeneration     Nonrheumatic mitral valve regurgitation 05/24/2022    Nonrheumatic tricuspid valve  "regurgitation 05/24/2022    Osteopenia     7/14 shama 7/16    Pulmonary heart disease     Pulmonary hypertension 05/29/2023    RVF (right ventricular failure) 03/05/2024    Scapholunate ligament injury with no instability     Scoliosis 11/30/2015    lumbar x-ray     Current Outpatient Medications   Medication Instructions    ELIQUIS 2.5 mg, Oral, 2 times daily    furosemide (LASIX) 20 mg, Oral, Daily    latanoprost 0.005 % ophthalmic solution INSTILL 1 DROP INTO BOTH EYES EVERY NIGHT    metoprolol succinate (TOPROL-XL) 25 mg, Oral    multivitamin (THERAGRAN) per tablet 1 tablet, Oral, Daily    STIOLTO RESPIMAT 2.5-2.5 mcg/actuation Mist 1 puff, Oral    timolol maleate 0.5% (TIMOPTIC) 0.5 % Drop 1 drop, Right Eye, Every morning    turmeric root extract 500 mg, Oral, Daily         Review of Systems   Constitutional: Negative.   HENT: Negative.     Eyes: Negative.    Cardiovascular:  Positive for dyspnea on exertion.   Respiratory:  Positive for shortness of breath.    Endocrine: Negative.    Hematologic/Lymphatic: Negative.    Skin: Negative.    Musculoskeletal:  Positive for arthritis and stiffness.   Gastrointestinal:  Positive for bloating.   Genitourinary: Negative.    Neurological:  Positive for weakness.   Psychiatric/Behavioral: Negative.     Allergic/Immunologic: Negative.        /65 (BP Location: Right arm, Patient Position: Sitting, BP Method: Large (Manual))   Pulse 85   Ht 5' 7" (1.702 m)   Wt 58.6 kg (129 lb 3 oz)   SpO2 97%   BMI 20.23 kg/m²     Wt Readings from Last 3 Encounters:   07/12/24 58.6 kg (129 lb 3 oz)   04/22/24 57.6 kg (127 lb)   03/05/24 59.7 kg (131 lb 9.8 oz)     Temp Readings from Last 3 Encounters:   04/22/24 98.4 °F (36.9 °C)   08/21/23 98.1 °F (36.7 °C) (Tympanic)   05/28/23 99.1 °F (37.3 °C) (Oral)     BP Readings from Last 3 Encounters:   07/12/24 100/65   04/22/24 110/60   03/05/24 (!) 140/80     Pulse Readings from Last 3 Encounters:   07/12/24 85   04/22/24 87 "   03/05/24 85          Objective:    Physical Exam  Vitals and nursing note reviewed.   Constitutional:       General: She is not in acute distress.     Appearance: Normal appearance. She is well-developed. She is not ill-appearing or diaphoretic.   HENT:      Head: Normocephalic.   Neck:      Thyroid: No thyromegaly.      Vascular: No carotid bruit or JVD.   Cardiovascular:      Rate and Rhythm: Normal rate. Rhythm irregularly irregular.      Pulses: Normal pulses.           Radial pulses are 2+ on the right side and 2+ on the left side.      Heart sounds: S1 normal and S2 normal. Murmur heard.      Medium-pitched midsystolic murmur is present with a grade of 2/6 at the upper left sternal border.      No friction rub. No gallop.   Pulmonary:      Effort: Pulmonary effort is normal.      Breath sounds: Normal breath sounds. No wheezing or rales.   Abdominal:      General: Bowel sounds are normal. There is no abdominal bruit.      Palpations: Abdomen is soft.      Tenderness: There is no abdominal tenderness.   Musculoskeletal:      Cervical back: Neck supple.      Right lower leg: Edema (trace) present.      Left lower leg: Edema (trace) present.   Lymphadenopathy:      Cervical: No cervical adenopathy.   Skin:     General: Skin is warm.   Neurological:      Mental Status: She is alert and oriented to person, place, and time.   Psychiatric:         Behavior: Behavior normal. Behavior is cooperative.       I have reviewed all pertinent labs and cardiac studies.    Component      Latest Ref Rng 3/19/2024   BNP      0 - 99 pg/mL 221 (H)       Legend:  (H) High      Component      Latest Ref Rng 2/20/2024   BNP      0 - 99 pg/mL 316 (H)       Legend:  (H) High      Component      Latest Ref Rng 8/15/2023   BNP      0 - 99 pg/mL 250 (H)       Legend:  (H) High    Component      Latest Ref Rng & Units 5/27/2023   BNP      0 - 99 pg/mL 302 (H)       Chemistry        Component Value Date/Time     03/19/2024 1254    K  "4.2 03/19/2024 1254     03/19/2024 1254    CO2 27 03/19/2024 1254    BUN 23 03/19/2024 1254    CREATININE 0.8 03/19/2024 1254    GLU 79 03/19/2024 1254        Component Value Date/Time    CALCIUM 10.0 03/19/2024 1254    ALKPHOS 66 03/19/2024 1254    AST 25 03/19/2024 1254    ALT 16 03/19/2024 1254    BILITOT 0.8 03/19/2024 1254    ESTGFRAFRICA >60.0 08/12/2021 0956    EGFRNONAA >60.0 08/12/2021 0956        Lab Results   Component Value Date    WBC 3.91 08/15/2023    HGB 14.0 08/15/2023    HCT 44.3 08/15/2023    MCV 93 08/15/2023     (L) 08/15/2023       Lab Results   Component Value Date    TSH 1.055 10/05/2017     No results found for: "LABA1C", "HGBA1C"  Lab Results   Component Value Date    CHOL 133 08/15/2023    CHOL 140 08/12/2021    CHOL 149 10/05/2017     Lab Results   Component Value Date    HDL 57 08/15/2023    HDL 66 08/12/2021    HDL 68 10/05/2017     Lab Results   Component Value Date    LDLCALC 69.8 08/15/2023    LDLCALC 61.2 (L) 08/12/2021    LDLCALC 68.4 10/05/2017     Lab Results   Component Value Date    TRIG 31 08/15/2023    TRIG 64 08/12/2021    TRIG 63 10/05/2017     Lab Results   Component Value Date    CHOLHDL 42.9 08/15/2023    CHOLHDL 47.1 08/12/2021    CHOLHDL 45.6 10/05/2017         Results for orders placed during the hospital encounter of 05/16/23    Echo    Interpretation Summary  · The left ventricle is normal in size with concentric remodeling and normal systolic function.  · Severe left atrial enlargement.  · Indeterminate left ventricular diastolic function.  · The estimated PA systolic pressure is 87 mmHg.  · Moderate right ventricular enlargement with moderately reduced right ventricular systolic function.  · There is pulmonary hypertension.  · Elevated central venous pressure (15 mmHg).  · The estimated ejection fraction is 60%.  · There is abnormal septal wall motion. There is systolic flattening of the interventricular septum consistent with right ventricle pressure " overload.  · Severe right atrial enlargement.  · Mild aortic regurgitation.  · Moderate mitral regurgitation.  · Moderate to severe tricuspid regurgitation.  · Mild pulmonic regurgitation.          Results for orders placed during the hospital encounter of 02/20/24    Echo    Interpretation Summary    Left Ventricle: The left ventricle is normal in size. Normal wall thickness. There is concentric remodeling. There is normal systolic function with a visually estimated ejection fraction of 55 - 60%. Unable to assess diastolic function due to atrial fibrillation.    Right Ventricle: Severe right ventricular enlargement. Wall thickness is normal. Right ventricle wall motion has global hypokinesis. Systolic function is moderately reduced.    Left Atrium: Left atrium is severely dilated.    Right Atrium: Right atrium is severely dilated.    Aortic Valve: There is mild aortic regurgitation.    Mitral Valve: Moderately thickened leaflets. Mildly calcified leaflets. There is moderate mitral annular calcification present. There is moderate to severe regurgitation.    Tricuspid Valve: There is moderate to severe regurgitation.    Pulmonic Valve: There is mild to moderate regurgitation.    Pulmonary Artery: There is pulmonary hypertension. The estimated pulmonary artery systolic pressure is 76 mmHg.    IVC/SVC: Elevated venous pressure at 15 mmHg.        Assessment:       1. Chronic diastolic heart failure    2. Abnormal ECG    3. Anticoagulated    4. Atherosclerosis of aorta    5. Atrial fibrillation, permanent    6. Nonrheumatic mitral valve regurgitation    7. Pulmonary heart disease    8. Nonrheumatic tricuspid valve regurgitation    9. RVF (right ventricular failure)    10. Pulmonary hypertension         Plan:               Stable CV status on current med tx.  Diastolic CHF: Compensated.  Low salt diet.   Lasix 20 mg daily basis for CHF.  Pt counseled on CHF mgt.  Daily weights.  Low salt diet.  COPD: F/u w Pulmonary as  scheduled.  RV failure: likely due  to severe COPD, diastolic CHF, valvular heart disease.  Permanent a fib: rate control strategy. Continue Eliquis bid.  PHTN: due to COPD, diastolic CHF.  F/u w Pulmonary.  F/u echo in future.  Valvular heart conditions: monitor. F/u echo in future.  Reviewed all tests and above medical conditions with patient in detail and formulated treatment plan.  Continue optimal medical treatment for cardiovascular conditions.  Cardiac low salt diet advised.  Daily exercise as tolerated.  Fall risk precautions.  Maintaining healthy weight and weight loss goals (if needed) were discussed in clinic.  Need for BP control and HTN goals (if needed) were discussed and tx plan formulated.  Importance of optimal lipid control were discussed in detail as well as possible pharmacologic and lifestyle changes that may be needed.  Abnl ecg: chronic abnormalities. monitor.      CMP/CBC/TSH/BNP labs today.      F/u in 4 months.      I have reviewed all pertinent labs and cardiac studies independently. Plans and recommendations have been formulated under my direct supervision. All questions answered and patient voiced understanding.

## 2024-07-29 DIAGNOSIS — H40.1132 PRIMARY OPEN ANGLE GLAUCOMA OF BOTH EYES, MODERATE STAGE: ICD-10-CM

## 2024-07-29 RX ORDER — TIMOLOL MALEATE 5 MG/ML
1 SOLUTION/ DROPS OPHTHALMIC EVERY MORNING
Qty: 10 ML | Refills: 3 | Status: SHIPPED | OUTPATIENT
Start: 2024-07-29

## 2024-08-02 DIAGNOSIS — J43.2 CENTRILOBULAR EMPHYSEMA: ICD-10-CM

## 2024-08-05 RX ORDER — TIOTROPIUM BROMIDE AND OLODATEROL 3.124; 2.736 UG/1; UG/1
SPRAY, METERED RESPIRATORY (INHALATION)
Qty: 4 G | Refills: 0 | Status: SHIPPED | OUTPATIENT
Start: 2024-08-05

## 2024-08-13 ENCOUNTER — E-VISIT (OUTPATIENT)
Dept: INTERNAL MEDICINE | Facility: CLINIC | Age: 89
End: 2024-08-13
Payer: MEDICARE

## 2024-08-13 ENCOUNTER — TELEPHONE (OUTPATIENT)
Dept: INTERNAL MEDICINE | Facility: CLINIC | Age: 89
End: 2024-08-13

## 2024-08-13 ENCOUNTER — PATIENT MESSAGE (OUTPATIENT)
Dept: INTERNAL MEDICINE | Facility: CLINIC | Age: 89
End: 2024-08-13
Payer: MEDICARE

## 2024-08-13 DIAGNOSIS — R19.7 DIARRHEA, UNSPECIFIED TYPE: Primary | ICD-10-CM

## 2024-08-13 PROCEDURE — 99499 UNLISTED E&M SERVICE: CPT | Mod: ,,, | Performed by: FAMILY MEDICINE

## 2024-08-13 NOTE — TELEPHONE ENCOUNTER
----- Message from Glenn Love MD sent at 8/13/2024 11:40 AM CDT -----  Requested evisit n/v/d ? Over a week duration.   Needs evaluation either here, urgent care

## 2024-08-13 NOTE — TELEPHONE ENCOUNTER
Tried calling the patient to schedule her a appt with Dr. Love with no answer. A message was left for a return call to Dr Love office.

## 2024-08-14 ENCOUNTER — TELEPHONE (OUTPATIENT)
Dept: INTERNAL MEDICINE | Facility: CLINIC | Age: 89
End: 2024-08-14
Payer: MEDICARE

## 2024-08-14 NOTE — TELEPHONE ENCOUNTER
----- Message from Donaldo Quick sent at 8/14/2024 11:29 AM CDT -----  Who Called:   Pt  Who Left Message for Patient:   Selwyn Wharton,   Does the patient know what this is regarding?:   Regarding message sent prior  Would the patient rather a call back or a response via [facility]?  Call back     Best Call Back Number:   Telephone Information:  [facility]          [phone]     Additional Information:     Thank you.

## 2024-08-14 NOTE — TELEPHONE ENCOUNTER
Spoke with the patient informing her Dr Love would like for the patient to be seen in clinic.the patient was scheduled an appt on 8/15/24 at 11:40 am.

## 2024-08-15 ENCOUNTER — OFFICE VISIT (OUTPATIENT)
Dept: INTERNAL MEDICINE | Facility: CLINIC | Age: 89
End: 2024-08-15
Payer: MEDICARE

## 2024-08-15 ENCOUNTER — HOSPITAL ENCOUNTER (OUTPATIENT)
Dept: RADIOLOGY | Facility: HOSPITAL | Age: 89
Discharge: HOME | End: 2024-08-15
Attending: FAMILY MEDICINE
Payer: MEDICARE

## 2024-08-15 VITALS
HEART RATE: 73 BPM | OXYGEN SATURATION: 96 % | WEIGHT: 128.31 LBS | DIASTOLIC BLOOD PRESSURE: 64 MMHG | HEIGHT: 67 IN | SYSTOLIC BLOOD PRESSURE: 110 MMHG | BODY MASS INDEX: 20.14 KG/M2 | TEMPERATURE: 98 F | RESPIRATION RATE: 16 BRPM

## 2024-08-15 DIAGNOSIS — R19.7 DIARRHEA, UNSPECIFIED TYPE: Primary | ICD-10-CM

## 2024-08-15 DIAGNOSIS — R19.7 DIARRHEA, UNSPECIFIED TYPE: ICD-10-CM

## 2024-08-15 PROCEDURE — 99999 PR PBB SHADOW E&M-EST. PATIENT-LVL IV: CPT | Mod: PBBFAC,HCNC,, | Performed by: FAMILY MEDICINE

## 2024-08-15 PROCEDURE — 99214 OFFICE O/P EST MOD 30 MIN: CPT | Mod: HCNC,S$GLB,, | Performed by: FAMILY MEDICINE

## 2024-08-15 PROCEDURE — 1126F AMNT PAIN NOTED NONE PRSNT: CPT | Mod: HCNC,CPTII,S$GLB, | Performed by: FAMILY MEDICINE

## 2024-08-15 PROCEDURE — 1159F MED LIST DOCD IN RCRD: CPT | Mod: HCNC,CPTII,S$GLB, | Performed by: FAMILY MEDICINE

## 2024-08-15 PROCEDURE — 3288F FALL RISK ASSESSMENT DOCD: CPT | Mod: HCNC,CPTII,S$GLB, | Performed by: FAMILY MEDICINE

## 2024-08-15 PROCEDURE — 74018 RADEX ABDOMEN 1 VIEW: CPT | Mod: TC,HCNC

## 2024-08-15 PROCEDURE — 1101F PT FALLS ASSESS-DOCD LE1/YR: CPT | Mod: HCNC,CPTII,S$GLB, | Performed by: FAMILY MEDICINE

## 2024-08-15 PROCEDURE — 74018 RADEX ABDOMEN 1 VIEW: CPT | Mod: 26,HCNC,, | Performed by: RADIOLOGY

## 2024-08-15 NOTE — PROGRESS NOTES
Subjective:      Patient ID: Destini Augustine is a 89 y.o. female.    Chief Complaint: Follow-up    HPI 3 weeks of diarrhea described as several loose stools a day more recently mostly in the morning rest of the day okay no normal bowel movements for formed stool and these few weeks no history of similar no abdominal pain no rectal bleeding no vomiting no new medications other than furosemide some months ago no antibiotics no foreign travel no unusual water sources she did use Imodium once early in the course the couple days of no bowel movement; p.o. intake has been fine Has maintain her diet and weight  Past Medical History:   Diagnosis Date    Arthritis     knees, hands    Atherosclerosis of aorta 02/11/2016    X-ray lumbar spine 9/21/2006    Atrial fibrillation     Atrial fibrillation, permanent 05/01/2017    Basal cell carcinoma     face, right thigh    Chronic diastolic heart failure 05/29/2023    COAG (chronic open-angle glaucoma) - Both Eyes 08/28/2013    COPD exacerbation 05/27/2023    Ex-smoker     Hamartoma     mandible    Macular degeneration     Nonrheumatic mitral valve regurgitation 05/24/2022    Nonrheumatic tricuspid valve regurgitation 05/24/2022    Osteopenia     7/14 shama 7/16    Pulmonary heart disease     Pulmonary hypertension 05/29/2023    RVF (right ventricular failure) 03/05/2024    Scapholunate ligament injury with no instability     Scoliosis 11/30/2015    lumbar x-ray      Past Surgical History:   Procedure Laterality Date    APPENDECTOMY  1970s    CATARACT EXTRACTION Bilateral     DR. Singleton    hysterectomy  1970s    complete    HYSTERECTOMY      about 39yrs old, partial    MOUTH SURGERY Bilateral 02/10/2020    rectocele and cystocele  1998 approx    TONSILLECTOMY, ADENOIDECTOMY  7 y/o      Social History     Socioeconomic History    Marital status:     Number of children: 2   Tobacco Use    Smoking status: Former     Current packs/day: 0.00     Average packs/day: 1.5 packs/day for  30.0 years (45.0 ttl pk-yrs)     Types: Cigarettes     Start date: 1950     Quit date: 1980     Years since quittin.5    Smokeless tobacco: Former   Substance and Sexual Activity    Alcohol use: Not Currently    Drug use: No    Sexual activity: Never     Partners: Female   Social History Narrative    Lives alone, no pets or smokers in household.     Social Determinants of Health     Financial Resource Strain: Low Risk  (2024)    Overall Financial Resource Strain (CARDIA)     Difficulty of Paying Living Expenses: Not hard at all   Food Insecurity: No Food Insecurity (2024)    Hunger Vital Sign     Worried About Running Out of Food in the Last Year: Never true     Ran Out of Food in the Last Year: Never true   Transportation Needs: No Transportation Needs (2024)    PRAPARE - Transportation     Lack of Transportation (Medical): No     Lack of Transportation (Non-Medical): No   Physical Activity: Sufficiently Active (2024)    Exercise Vital Sign     Days of Exercise per Week: 4 days     Minutes of Exercise per Session: 60 min   Stress: No Stress Concern Present (2024)    [employer] - Occupational Stress Questionnaire     Feeling of Stress : Not at all   Housing Stability: Low Risk  (2024)    Housing Stability Vital Sign     Unable to Pay for Housing in the Last Year: No     Homeless in the Last Year: No      Family History   Problem Relation Name Age of Onset    Cataracts Mother      Hypertension Mother      Stroke Mother      Cancer Father          colon    Thyroid disease Father      Cancer Brother          lung    Tuberculosis Brother      Blindness Neg Hx      Diabetes Neg Hx      Macular degeneration Neg Hx      Retinal detachment Neg Hx      Strabismus Neg Hx        Review of Systems        Objective:     Physical Exam  Vitals and nursing note reviewed.   Constitutional:       Appearance: She is well-developed.   HENT:      Head:  Normocephalic and atraumatic.   Neck:      Vascular: No carotid bruit.   Cardiovascular:      Rate and Rhythm: Normal rate and regular rhythm.   Pulmonary:      Effort: Pulmonary effort is normal.      Breath sounds: Normal breath sounds.   Abdominal:      General: Bowel sounds are normal. There is no distension.      Palpations: Abdomen is soft. There is no mass.      Tenderness: There is no abdominal tenderness. There is no guarding or rebound.   Musculoskeletal:      Cervical back: Normal range of motion and neck supple.   Lymphadenopathy:      Cervical: No cervical adenopathy.   Skin:     General: Skin is warm and dry.   Neurological:      Mental Status: She is alert and oriented to person, place, and time.   Psychiatric:         Behavior: Behavior normal.         Judgment: Judgment normal.       Assessment:         ICD-10-CM ICD-9-CM   1. Diarrhea, unspecified type  R19.7 787.91      Plan:    Discussed the importance of maintaining good hydration nutrition    1. Diarrhea, unspecified type  -     Stool Exam-Ova,Cysts,Parasites; Future; Expected date: 08/15/2024  -     WBC, Stool; Future; Expected date: 08/15/2024  -     Stool culture; Future; Expected date: 08/15/2024  -     Clostridium difficile EIA; Future; Expected date: 08/15/2024  -     X-Ray Abdomen AP 1 View; Future; Expected date: 08/15/2024  -     Comprehensive Metabolic Panel; Future; Expected date: 08/15/2024  -     CBC Auto Differential; Future; Expected date: 08/15/2024       Discussed possibilities of infection versus overflow diarrhea    She has follow up with me already scheduled in 1 week we will wait results of tests above his any change before then notify and definitely avoid antidiarrheal medicines for now

## 2024-08-17 ENCOUNTER — LAB VISIT (OUTPATIENT)
Dept: LAB | Facility: HOSPITAL | Age: 89
End: 2024-08-17
Payer: MEDICARE

## 2024-08-17 DIAGNOSIS — R19.7 DIARRHEA, UNSPECIFIED TYPE: ICD-10-CM

## 2024-08-17 LAB
C DIFF GDH STL QL: NEGATIVE
C DIFF TOX A+B STL QL IA: NEGATIVE
WBC #/AREA STL HPF: NORMAL /[HPF]

## 2024-08-17 PROCEDURE — 87449 NOS EACH ORGANISM AG IA: CPT | Mod: HCNC | Performed by: FAMILY MEDICINE

## 2024-08-17 PROCEDURE — 87209 SMEAR COMPLEX STAIN: CPT | Mod: HCNC | Performed by: FAMILY MEDICINE

## 2024-08-17 PROCEDURE — 89055 LEUKOCYTE ASSESSMENT FECAL: CPT | Mod: HCNC | Performed by: FAMILY MEDICINE

## 2024-08-17 PROCEDURE — 87449 NOS EACH ORGANISM AG IA: CPT | Mod: 91,HCNC | Performed by: FAMILY MEDICINE

## 2024-08-17 PROCEDURE — 87427 SHIGA-LIKE TOXIN AG IA: CPT | Mod: 59,HCNC | Performed by: FAMILY MEDICINE

## 2024-08-17 PROCEDURE — 87324 CLOSTRIDIUM AG IA: CPT | Mod: HCNC | Performed by: FAMILY MEDICINE

## 2024-08-17 PROCEDURE — 87045 FECES CULTURE AEROBIC BACT: CPT | Mod: HCNC | Performed by: FAMILY MEDICINE

## 2024-08-17 PROCEDURE — 87046 STOOL CULTR AEROBIC BACT EA: CPT | Mod: HCNC | Performed by: FAMILY MEDICINE

## 2024-08-19 LAB — BACTERIA STL CULT: NORMAL

## 2024-08-21 LAB
E COLI SXT1 STL QL IA: NEGATIVE
E COLI SXT2 STL QL IA: NEGATIVE

## 2024-08-22 ENCOUNTER — OFFICE VISIT (OUTPATIENT)
Dept: INTERNAL MEDICINE | Facility: CLINIC | Age: 89
End: 2024-08-22
Payer: MEDICARE

## 2024-08-22 VITALS
SYSTOLIC BLOOD PRESSURE: 110 MMHG | TEMPERATURE: 99 F | HEART RATE: 86 BPM | WEIGHT: 129.88 LBS | BODY MASS INDEX: 20.38 KG/M2 | DIASTOLIC BLOOD PRESSURE: 80 MMHG | HEIGHT: 67 IN | OXYGEN SATURATION: 95 %

## 2024-08-22 DIAGNOSIS — R19.7 DIARRHEA, UNSPECIFIED TYPE: ICD-10-CM

## 2024-08-22 DIAGNOSIS — Z00.00 ROUTINE HEALTH MAINTENANCE: Primary | ICD-10-CM

## 2024-08-22 PROCEDURE — 1126F AMNT PAIN NOTED NONE PRSNT: CPT | Mod: HCNC,CPTII,S$GLB, | Performed by: FAMILY MEDICINE

## 2024-08-22 PROCEDURE — 99397 PER PM REEVAL EST PAT 65+ YR: CPT | Mod: HCNC,S$GLB,, | Performed by: FAMILY MEDICINE

## 2024-08-22 PROCEDURE — 99999 PR PBB SHADOW E&M-EST. PATIENT-LVL IV: CPT | Mod: PBBFAC,HCNC,, | Performed by: FAMILY MEDICINE

## 2024-08-22 PROCEDURE — 1101F PT FALLS ASSESS-DOCD LE1/YR: CPT | Mod: HCNC,CPTII,S$GLB, | Performed by: FAMILY MEDICINE

## 2024-08-22 PROCEDURE — 3288F FALL RISK ASSESSMENT DOCD: CPT | Mod: HCNC,CPTII,S$GLB, | Performed by: FAMILY MEDICINE

## 2024-08-22 PROCEDURE — 1159F MED LIST DOCD IN RCRD: CPT | Mod: HCNC,CPTII,S$GLB, | Performed by: FAMILY MEDICINE

## 2024-08-24 LAB — O+P STL MICRO: NORMAL

## 2024-08-26 ENCOUNTER — TELEPHONE (OUTPATIENT)
Dept: INTERNAL MEDICINE | Facility: CLINIC | Age: 89
End: 2024-08-26
Payer: MEDICARE

## 2024-08-26 DIAGNOSIS — R19.7 DIARRHEA, UNSPECIFIED TYPE: Primary | ICD-10-CM

## 2024-08-27 ENCOUNTER — E-CONSULT (OUTPATIENT)
Dept: GASTROENTEROLOGY | Facility: CLINIC | Age: 89
End: 2024-08-27
Payer: MEDICARE

## 2024-08-27 DIAGNOSIS — R19.7 ACUTE DIARRHEA: Primary | ICD-10-CM

## 2024-08-27 DIAGNOSIS — I48.19 ATRIAL FIBRILLATION, PERSISTENT: ICD-10-CM

## 2024-08-27 NOTE — CONSULTS
[facility] 4th Fl  Response for E-Consult     Patient Name: Destini Augustine  MRN: [id number]  Primary Care Provider: Glenn Love MD   Requesting Provider: Glenn Love MD  Consults    Recommendation: I suggest a conservative approach, especially if she does not have any other significant GI symptoms or electrolyte abnormalities.  A GI pathogen PCR panel may be checked.  If the diarrhea is still present, she can retry OTC antidiarrheal medications.  Bismuth subsalicylate might be a good option in this case. Please make sure she is not taking any OTC NSAIDs.      Contingency that warrants a repeat eConsult or referral: If the diarrhea worsens or does not respond to OTC antidiarrheal meds, she may have a referral to GI clinic for further evaluation.     Total time of Consultation: 10 minute    I did not speak to the requesting provider verbally about this.     *This eConsult is based on the clinical data available to me and is furnished without benefit of a physical examination. The eConsult will need to be interpreted in light of any clinical issues or changes in patient status not available to me at the time of filing this eConsults. Significant changes in patient condition or level of acuity should result in immediate formal consultation and reevaluation. Please alert me if you have further questions.    Thank you for this eConsult referral.     Lew Bernal MD  [address]

## 2024-08-28 RX ORDER — APIXABAN 2.5 MG/1
2.5 TABLET, FILM COATED ORAL 2 TIMES DAILY
Qty: 180 TABLET | Refills: 3 | Status: SHIPPED | OUTPATIENT
Start: 2024-08-28

## 2024-08-28 RX ORDER — METOPROLOL SUCCINATE 25 MG/1
25 TABLET, EXTENDED RELEASE ORAL
Qty: 90 TABLET | Refills: 3 | Status: SHIPPED | OUTPATIENT
Start: 2024-08-28

## 2024-09-03 ENCOUNTER — TELEPHONE (OUTPATIENT)
Dept: INTERNAL MEDICINE | Facility: CLINIC | Age: 89
End: 2024-09-03
Payer: MEDICARE

## 2024-09-03 NOTE — TELEPHONE ENCOUNTER
----- Message from Jeannie Garcia sent at 9/3/2024 11:52 AM CDT -----  Contact: Patient [phone]  Patient is returning a phone call.     Who left a message for the patient:Nurse      Does patient know what this is regarding:Unknown     Would you like a call back, or a response through your [facility] portal?:Call back      Comments:Pt just missed nurse call pt would like a call back

## 2024-09-03 NOTE — TELEPHONE ENCOUNTER
Robyn now (3:04 PM)     TL  Spoke with the patient informing her the following recommendation from Dr Love.      As long as diarrhea is resolving then continue benefiber indefinitely even when bms return to normal  If they dont return to normal in next couple weeks then gi         The patient stated that the diarrhea has started back. The patient stated that the diarrhea was uncontrol but she started taking Pepto bismol and the diarrhea came back. The patient was informed that a message will be sent to Dr Love for a GI referral.            Note

## 2024-09-03 NOTE — TELEPHONE ENCOUNTER
----- Message from Iraida Bailey sent at 8/26/2024 12:26 PM CDT -----  Contact: pt  Pt is calling in rgd to needing the dr to know that she has seen her results and need to know where to go from here.  Please call her back at [phone]  
Glenn Love MD  You5 hours ago (6:18 AM)       As long as diarrhea is resolving then continue benefiber indefinitely even when bms return to normal  If they dont return to normal in next couple weeks then gi           Tried calling the patient with no answer. A message was left for a return call to Dr Love's office.   
Spoke with the patient informing her the following recommendation from Dr Love.     As long as diarrhea is resolving then continue benefiber indefinitely even when bms return to normal  If they dont return to normal in next couple weeks then gi        The patient stated that the diarrhea has started back. The patient stated that the diarrhea was uncontrol but she started taking Pepto bismol and the diarrhea came back. The patient was informed that a message will be sent to Dr Love for a GI referral.   
Spoke with the patient stated that she seen her results which are normal. The patient stated that she has started taking the benifiber three times a day. The patient stated that it seems to be helping. The patient asked what is next. The patient was informed that a message will be sent to Dr Love.   
[[id number]]

## 2024-09-06 ENCOUNTER — PATIENT MESSAGE (OUTPATIENT)
Dept: INTERNAL MEDICINE | Facility: CLINIC | Age: 89
End: 2024-09-06
Payer: MEDICARE

## 2024-09-06 DIAGNOSIS — R19.7 DIARRHEA, UNSPECIFIED TYPE: Primary | ICD-10-CM

## 2024-09-09 ENCOUNTER — PATIENT MESSAGE (OUTPATIENT)
Dept: PULMONOLOGY | Facility: CLINIC | Age: 89
End: 2024-09-09
Payer: MEDICARE

## 2024-09-10 ENCOUNTER — OFFICE VISIT (OUTPATIENT)
Dept: OPHTHALMOLOGY | Facility: CLINIC | Age: 89
End: 2024-09-10
Payer: MEDICARE

## 2024-09-10 ENCOUNTER — PATIENT MESSAGE (OUTPATIENT)
Dept: INTERNAL MEDICINE | Facility: CLINIC | Age: 89
End: 2024-09-10
Payer: MEDICARE

## 2024-09-10 DIAGNOSIS — Z96.1 PSEUDOPHAKIA OF BOTH EYES: ICD-10-CM

## 2024-09-10 DIAGNOSIS — H40.1132 PRIMARY OPEN ANGLE GLAUCOMA OF BOTH EYES, MODERATE STAGE: Primary | ICD-10-CM

## 2024-09-10 DIAGNOSIS — H04.129 DRY EYE: ICD-10-CM

## 2024-09-10 PROCEDURE — 92133 CPTRZD OPH DX IMG PST SGM ON: CPT | Mod: S$GLB,,, | Performed by: OPHTHALMOLOGY

## 2024-09-10 PROCEDURE — 1160F RVW MEDS BY RX/DR IN RCRD: CPT | Mod: CPTII,S$GLB,, | Performed by: OPHTHALMOLOGY

## 2024-09-10 PROCEDURE — 99214 OFFICE O/P EST MOD 30 MIN: CPT | Mod: S$GLB,,, | Performed by: OPHTHALMOLOGY

## 2024-09-10 PROCEDURE — 1159F MED LIST DOCD IN RCRD: CPT | Mod: CPTII,S$GLB,, | Performed by: OPHTHALMOLOGY

## 2024-09-10 PROCEDURE — 99999 PR PBB SHADOW E&M-EST. PATIENT-LVL II: CPT | Mod: PBBFAC,,, | Performed by: OPHTHALMOLOGY

## 2024-09-10 RX ORDER — TIMOLOL MALEATE 5 MG/ML
1 SOLUTION/ DROPS OPHTHALMIC 2 TIMES DAILY
Qty: 15 ML | Refills: 4 | Status: SHIPPED | OUTPATIENT
Start: 2024-09-10

## 2024-09-10 RX ORDER — LATANOPROST 50 UG/ML
1 SOLUTION/ DROPS OPHTHALMIC NIGHTLY
Qty: 9 ML | Refills: 4 | Status: SHIPPED | OUTPATIENT
Start: 2024-09-10

## 2024-09-10 NOTE — PROGRESS NOTES
SUBJECTIVE  Destini Augustine is 89 y.o. female  Uncorrected distance visual acuity was 20/25 -1 in the right eye and 20/30 -2 in the left eye.   Chief Complaint   Patient presents with    Glaucoma     Pt reports for 6m IOP GOCT. Denies any pain or irritation. Va stable. 100% compliant with gtts.           HPI     Glaucoma     Additional comments: Pt reports for 6m IOP GOCT. Denies any pain or   irritation. Va stable. 100% compliant with gtts.            Comments    1. Mod COAG OD>OS (Initial 30/26) goal = 17   2. PCIOL OD w/ ECP & Toric IOL 5/20/09   PCIOL OS w/ ECP & Toric IOL 4/22/09   3. Mild Dry AMD   4. PCO OS   5. Pseudophakia OU      Latanoprost QHS OU   Timolol BID OD     Ocuvite   HAG (does not do)   O3FO  WC/LS             Last edited by Benjie Daniel on 9/10/2024 12:57 PM.         Assessment /Plan :  1. Primary open angle glaucoma of both eyes, moderate stage Doing well, intraocular pressure (IOP) within acceptable range relative to target IOP and no evidence of progression. Continue current treatment. Reviewed importance of continued compliance with treatment and follow up.      Patient instructed to continue using the following glaucoma medication as follows:  Latanoprost one drop in each eye nightly and Timolol one drop in the right eye every 12 hours    Return to clinic in 6 months with Dr. Madrigal or as needed.  With GOCT, Dilation, and HVF 24-2     2. Pseudophakia of both eyes  -- Condition stable, no therapeutic change required. Monitoring routinely.     3. Dry eye  -- Condition stable, no therapeutic change required. Monitoring routinely.

## 2024-09-16 ENCOUNTER — TELEPHONE (OUTPATIENT)
Dept: INTERNAL MEDICINE | Facility: CLINIC | Age: 89
End: 2024-09-16
Payer: MEDICARE

## 2024-09-16 NOTE — TELEPHONE ENCOUNTER
Spoke with the patient stated that she is still having diarrhea. The patient was informed that the soonest appointment wit gastro is in Dec. The patient was informed that I will ask Dr Love. The information was discuss with Dr Love concerning the patient still having diarrhea. The patient was informed to contact [facility]. The patient stated understanding.

## 2024-09-16 NOTE — TELEPHONE ENCOUNTER
----- Message from Aurora Blanco sent at 9/16/2024  1:41 PM CDT -----  Contact: [phone]  Would like to receive medical advice.    Pt called checking the status of a referral to see a gastro doctor. Pt stated that she have not received a phone call.     Would they like a call back or a response via [facility]:  call    Additional information:  Please call to advise

## 2024-09-17 ENCOUNTER — TELEPHONE (OUTPATIENT)
Dept: PULMONOLOGY | Facility: CLINIC | Age: 89
End: 2024-09-17
Payer: MEDICARE

## 2024-09-17 ENCOUNTER — HOSPITAL ENCOUNTER (OUTPATIENT)
Dept: RADIOLOGY | Facility: HOSPITAL | Age: 89
Discharge: HOME | End: 2024-09-17
Attending: INTERNAL MEDICINE
Payer: MEDICARE

## 2024-09-17 ENCOUNTER — OFFICE VISIT (OUTPATIENT)
Dept: PULMONOLOGY | Facility: CLINIC | Age: 89
End: 2024-09-17
Payer: MEDICARE

## 2024-09-17 ENCOUNTER — NURSE TRIAGE (OUTPATIENT)
Dept: ADMINISTRATIVE | Facility: CLINIC | Age: 89
End: 2024-09-17
Payer: MEDICARE

## 2024-09-17 VITALS
HEART RATE: 86 BPM | SYSTOLIC BLOOD PRESSURE: 102 MMHG | RESPIRATION RATE: 19 BRPM | BODY MASS INDEX: 19.93 KG/M2 | DIASTOLIC BLOOD PRESSURE: 66 MMHG | HEIGHT: 67 IN | OXYGEN SATURATION: 97 % | WEIGHT: 127 LBS

## 2024-09-17 DIAGNOSIS — J43.2 CENTRILOBULAR EMPHYSEMA: Primary | ICD-10-CM

## 2024-09-17 DIAGNOSIS — J44.1 COPD EXACERBATION: ICD-10-CM

## 2024-09-17 DIAGNOSIS — J43.2 CENTRILOBULAR EMPHYSEMA: ICD-10-CM

## 2024-09-17 PROCEDURE — 1101F PT FALLS ASSESS-DOCD LE1/YR: CPT | Mod: CPTII,S$GLB,, | Performed by: INTERNAL MEDICINE

## 2024-09-17 PROCEDURE — 3288F FALL RISK ASSESSMENT DOCD: CPT | Mod: CPTII,S$GLB,, | Performed by: INTERNAL MEDICINE

## 2024-09-17 PROCEDURE — 1159F MED LIST DOCD IN RCRD: CPT | Mod: CPTII,S$GLB,, | Performed by: INTERNAL MEDICINE

## 2024-09-17 PROCEDURE — 1160F RVW MEDS BY RX/DR IN RCRD: CPT | Mod: CPTII,S$GLB,, | Performed by: INTERNAL MEDICINE

## 2024-09-17 PROCEDURE — 71046 X-RAY EXAM CHEST 2 VIEWS: CPT | Mod: TC

## 2024-09-17 PROCEDURE — 99999 PR PBB SHADOW E&M-EST. PATIENT-LVL III: CPT | Mod: PBBFAC,,, | Performed by: INTERNAL MEDICINE

## 2024-09-17 PROCEDURE — 71046 X-RAY EXAM CHEST 2 VIEWS: CPT | Mod: 26,,, | Performed by: RADIOLOGY

## 2024-09-17 PROCEDURE — 99214 OFFICE O/P EST MOD 30 MIN: CPT | Mod: S$GLB,,, | Performed by: INTERNAL MEDICINE

## 2024-09-17 RX ORDER — AZITHROMYCIN 250 MG/1
TABLET, FILM COATED ORAL
Qty: 6 TABLET | Refills: 0 | Status: SHIPPED | OUTPATIENT
Start: 2024-09-17

## 2024-09-17 NOTE — PROGRESS NOTES
Pulmonary Outpatient  Visit     Subjective:       Patient ID: Destini Augustine is a 89 y.o. female.    Social History     Tobacco Use   Smoking Status Former    Current packs/day: 0.00    Average packs/day: 1.5 packs/day for 30.0 years (45.0 ttl pk-yrs)    Types: Cigarettes    Start date: 1950    Quit date: 1980    Years since quittin.6   Smokeless Tobacco Former            Chief Complaint: Cough and COPD      Destini Augustine is 89 y.o.  Urgent visit  Known severe emphysema  Not on O2  On Stiolto  Today: cough, congestion, sputum tinge of blood  So violent had to cancel GI appt: work up for chr diarrhea  Fever  Cxr reveiwed  Has scheduled chest CT next week  Records other providers reviewed                 Review of Systems   HENT:  Positive for congestion.    Respiratory:  Positive for cough, sputum production and choking.        Outpatient Encounter Medications as of 2024   Medication Sig Dispense Refill    azithromycin (Z-VIOLA) 250 MG tablet Take 2 tablets by mouth on day 1; Take 1 tablet by mouth on days 2-5 6 tablet 0    ELIQUIS 2.5 mg Tab TAKE 1 TABLET TWICE DAILY 180 tablet 3    furosemide (LASIX) 20 MG tablet Take 1 tablet (20 mg total) by mouth once daily. 30 tablet 11    latanoprost 0.005 % ophthalmic solution Place 1 drop into both eyes every evening. 9 mL 4    metoprolol succinate (TOPROL-XL) 25 MG 24 hr tablet TAKE 1 TABLET EVERY DAY 90 tablet 3    multivitamin (THERAGRAN) per tablet Take 1 tablet by mouth once daily.      STIOLTO RESPIMAT 2.5-2.5 mcg/actuation Mist INHALE 1 PUFF INTO LUNGS ONCE DAILY 4 g 0    timolol maleate 0.5% (TIMOPTIC) 0.5 % Drop Place 1 drop into the right eye 2 (two) times daily. 15 mL 4    turmeric root extract 500 mg Cap Take 500 mg by mouth once daily.       No facility-administered encounter medications on file as of 2024.            Pertinent Work Up: CXR         Smoking: quit       The following portions of  the patient's history were reviewed and updated as appropriate: She  has a past medical history of Arthritis, Atherosclerosis of aorta (02/11/2016), Atrial fibrillation, Atrial fibrillation, permanent (05/01/2017), Basal cell carcinoma, Chronic diastolic heart failure (05/29/2023), COAG (chronic open-angle glaucoma) - Both Eyes (08/28/2013), COPD exacerbation (05/27/2023), Ex-smoker, Hamartoma, Macular degeneration, Nonrheumatic mitral valve regurgitation (05/24/2022), Nonrheumatic tricuspid valve regurgitation (05/24/2022), Osteopenia, Pulmonary heart disease, Pulmonary hypertension (05/29/2023), RVF (right ventricular failure) (03/05/2024), Scapholunate ligament injury with no instability, and Scoliosis (11/30/2015).  She does not have any pertinent problems on file.  She  has a past surgical history that includes Cataract extraction (Bilateral); rectocele and cystocele (1998 approx); TONSILLECTOMY, ADENOIDECTOMY (7 y/o); Appendectomy (1970s); hysterectomy (1970s); Hysterectomy; and Mouth surgery (Bilateral, 02/10/2020).  Her family history includes Cancer in her brother and father; Cataracts in her mother; Hypertension in her mother; Stroke in her mother; Thyroid disease in her father; Tuberculosis in her brother.  She  reports that she quit smoking about 44 years ago. Her smoking use included cigarettes. She started smoking about 74 years ago. She has a 45 pack-year smoking history. She has quit using smokeless tobacco. She reports that she does not currently use alcohol. She reports that she does not use drugs.  She has a current medication list which includes the following prescription(s): azithromycin, eliquis, furosemide, latanoprost, metoprolol succinate, multivitamin, stiolto respimat, timolol maleate 0.5%, and turmeric root extract.  Current Outpatient Medications on File Prior to Visit   Medication Sig Dispense Refill    ELIQUIS 2.5 mg Tab TAKE 1 TABLET TWICE DAILY 180 tablet 3    furosemide (LASIX) 20  MG tablet Take 1 tablet (20 mg total) by mouth once daily. 30 tablet 11    latanoprost 0.005 % ophthalmic solution Place 1 drop into both eyes every evening. 9 mL 4    metoprolol succinate (TOPROL-XL) 25 MG 24 hr tablet TAKE 1 TABLET EVERY DAY 90 tablet 3    multivitamin (THERAGRAN) per tablet Take 1 tablet by mouth once daily.      STIOLTO RESPIMAT 2.5-2.5 mcg/actuation Mist INHALE 1 PUFF INTO LUNGS ONCE DAILY 4 g 0    timolol maleate 0.5% (TIMOPTIC) 0.5 % Drop Place 1 drop into the right eye 2 (two) times daily. 15 mL 4    turmeric root extract 500 mg Cap Take 500 mg by mouth once daily.       No current facility-administered medications on file prior to visit.     She is allergic to shellfish containing products and venom-wasp..      BP Readings from Last 3 Encounters:   09/17/24 102/66   08/22/24 110/80   08/15/24 110/64            MMRC Dyspnea Scale (4 is worst)     [] MMRC 0: Dyspneic on strenuous excercise (0 points)    [] MMRC 1: Dyspneic on walking a slight hill (0 points)    [] MMRC 2: Dyspneic on walking level ground; must stop occasionally due to breathlessness (1 point)    [] MMRC 3: Must stop for breathlessness after walking 100 yards or after a few minutes (2 points)    [] MMRC 4: Cannot leave house; breathless on dressing/undressing (3 points)              COPD Questionnaire  How often do you cough?: Almost never  How often do you have phlegm (mucus) in your chest?: Some of the time  How often does your chest feel tight?: Never  When you walk up a hill or one flight of stairs, how often are you breathless?: Never  How often are you limited doing any activities at home?: A little of the time  How often are you confident leaving the house despite your lung condition?: All of the time  How often do you sleep soundly?: Some of the time  How often do you have energy?: A little of the time  Total score: 11           No data to display                          Objective:     Vital Signs (Most Recent)  Vital  "Signs  Pulse: 86  Resp: 19  SpO2: 97 %  BP: 102/66  Height and Weight  Height: 5' 7" (170.2 cm)  Weight: 57.6 kg (126 lb 15.8 oz)  BSA (Calculated - sq m): 1.65 sq meters  BMI (Calculated): 19.9  Weight in (lb) to have BMI = 25: 159.3]  Wt Readings from Last 2 Encounters:   09/17/24 57.6 kg (126 lb 15.8 oz)   08/22/24 58.9 kg (129 lb 13.6 oz)       Physical Exam  Vitals and nursing note reviewed.   Constitutional:       Appearance: She is normal weight.   HENT:      Head: Normocephalic and atraumatic.      Nose: Nose normal.   Eyes:      Pupils: Pupils are equal, round, and reactive to light.   Cardiovascular:      Rate and Rhythm: Normal rate and regular rhythm.      Pulses: Normal pulses.      Heart sounds: Normal heart sounds.   Pulmonary:      Effort: Pulmonary effort is normal.      Breath sounds: Normal breath sounds.   Abdominal:      General: Bowel sounds are normal.      Palpations: Abdomen is soft.   Musculoskeletal:      Cervical back: Normal range of motion.   Skin:     General: Skin is warm.   Neurological:      General: No focal deficit present.      Mental Status: She is alert and oriented to person, place, and time.   Psychiatric:         Mood and Affect: Mood normal.          Laboratory  Lab Results   Component Value Date    WBC 5.44 08/15/2024    RBC 4.89 08/15/2024    HGB 14.2 08/15/2024    HCT 46.0 08/15/2024    MCV 94 08/15/2024    MCH 29.0 08/15/2024    MCHC 30.9 (L) 08/15/2024    RDW 13.4 08/15/2024     08/15/2024    MPV 11.5 08/15/2024    GRAN 3.5 08/15/2024    GRAN 64.8 08/15/2024    LYMPH 1.4 08/15/2024    LYMPH 25.9 08/15/2024    MONO 0.4 08/15/2024    MONO 6.4 08/15/2024    EOS 0.1 08/15/2024    BASO 0.04 08/15/2024    EOSINOPHIL 2.0 08/15/2024    BASOPHIL 0.7 08/15/2024       BMP  Lab Results   Component Value Date     08/15/2024    K 4.1 08/15/2024     08/15/2024    CO2 27 08/15/2024    BUN 25 08/15/2024    CREATININE 0.9 08/15/2024    CALCIUM 9.9 08/15/2024    " "ANIONGAP 8 08/15/2024    ESTGFRAFRICA >60.0 08/12/2021    EGFRNONAA >60.0 08/12/2021    AST 24 08/15/2024    ALT 17 08/15/2024    PROT 7.5 08/15/2024          No results found for: "IGE"     No results found for: "ASPERGILLUS"  No results found for: "AFUMIGATUSCL"     No results found for: "ACE"     Diagnostic Results:  I have personally reviewed today the following studies:    X-Ray Chest PA And Lateral  Narrative: EXAMINATION:  XR CHEST PA AND LATERAL    CLINICAL HISTORY:  Centrilobular emphysema    TECHNIQUE:  PA and lateral views of the chest were performed.    COMPARISON:  05/27/2023    FINDINGS:  The lungs are clear and free of infiltrate.  No pleural effusion or pneumothorax. The heart is enlarged.  There is tortuosity of the descending thoracic aorta.  Impression: 1.  No acute cardiopulmonary process.    Electronically signed by: Junior Valenzuela DO  Date:    09/17/2024  Time:    14:58       Assessment/Plan:     Problem List Items Addressed This Visit       COPD exacerbation    Relevant Medications    azithromycin (Z-VIOLA) 250 MG tablet    Pulmonary emphysema - Primary     On STIOLTO REPIMAT         Relevant Medications    azithromycin (Z-VIOLA) 250 MG tablet             Follow up if symptoms worsen or fail to improve.    This note was prepared using voice recognition system and is likely to have sound alike errors that may have been overlooked even after proof reading.  Please call me with any questions    Discussed diagnosis, its evaluation, treatment and usual course. All questions answered.    Thank you for the courtesy of participating in the care of this patient    Elgin Bae MD      Personal Diagnostic Review  []  CXR    []  ECHO    []  ONSAT    []  6MWD    []  LABS    []  CHEST CT    []  PET CT    []  Biopsy results           "

## 2024-09-17 NOTE — TELEPHONE ENCOUNTER
[place]    PCP:  Dr. Glenn Love    Pt reports H/O COPD.  C/O coughing up phlegm with flecks of blood tinge to it for the last hr and ongoing moderate SOB.  Denies fever, H/O clots to legs/lungs, CP, runny nose, and wheezing.  Per protocol, care advised is go to ED now.  Pt VU and refused care advised.  Care advice reinforced but she continues to refuse care advice.  Advised to call for worsening/questions/concerns.  VU.    Reason for Disposition   MODERATE difficulty breathing (e.g., speaks in phrases, SOB even at rest, pulse 100-120) and still present when not coughing    Additional Information   Negative: SEVERE difficulty breathing (e.g., struggling for each breath, speaks in single words)   Negative: Chest pain and difficulty breathing   Negative: Bluish (or gray) lips or face now   Negative: Passed out (i.e., lost consciousness, collapsed and was not responding)   Negative: Shock suspected (e.g., cold/pale/clammy skin, too weak to stand, low BP, rapid pulse)   Negative: Difficult to awaken or acting confused (e.g., disoriented, slurred speech)   Negative: Recent chest injury (i.e., past 24 hours)   Negative: Coughed up blood and large amount (Such as: 'a half cup of blood')   Negative: Sounds like a life-threatening emergency to the triager    Protocols used: Coughing Up Blood-A-OH

## 2024-09-24 ENCOUNTER — OFFICE VISIT (OUTPATIENT)
Dept: PULMONOLOGY | Facility: CLINIC | Age: 89
End: 2024-09-24
Payer: MEDICARE

## 2024-09-24 ENCOUNTER — HOSPITAL ENCOUNTER (OUTPATIENT)
Dept: RADIOLOGY | Facility: HOSPITAL | Age: 89
Discharge: HOME | End: 2024-09-24
Attending: INTERNAL MEDICINE
Payer: MEDICARE

## 2024-09-24 VITALS
RESPIRATION RATE: 23 BRPM | HEART RATE: 90 BPM | SYSTOLIC BLOOD PRESSURE: 116 MMHG | WEIGHT: 125 LBS | DIASTOLIC BLOOD PRESSURE: 72 MMHG | BODY MASS INDEX: 19.62 KG/M2 | HEIGHT: 67 IN | OXYGEN SATURATION: 96 %

## 2024-09-24 DIAGNOSIS — J43.2 CENTRILOBULAR EMPHYSEMA: Primary | ICD-10-CM

## 2024-09-24 DIAGNOSIS — R91.1 SOLITARY PULMONARY NODULE: ICD-10-CM

## 2024-09-24 PROCEDURE — 71250 CT THORAX DX C-: CPT | Mod: TC

## 2024-09-24 PROCEDURE — 99999 PR PBB SHADOW E&M-EST. PATIENT-LVL III: CPT | Mod: PBBFAC,,, | Performed by: INTERNAL MEDICINE

## 2024-09-24 PROCEDURE — 1159F MED LIST DOCD IN RCRD: CPT | Mod: CPTII,S$GLB,, | Performed by: INTERNAL MEDICINE

## 2024-09-24 PROCEDURE — 3288F FALL RISK ASSESSMENT DOCD: CPT | Mod: CPTII,S$GLB,, | Performed by: INTERNAL MEDICINE

## 2024-09-24 PROCEDURE — 1101F PT FALLS ASSESS-DOCD LE1/YR: CPT | Mod: CPTII,S$GLB,, | Performed by: INTERNAL MEDICINE

## 2024-09-24 PROCEDURE — 99214 OFFICE O/P EST MOD 30 MIN: CPT | Mod: S$GLB,,, | Performed by: INTERNAL MEDICINE

## 2024-09-24 PROCEDURE — 71250 CT THORAX DX C-: CPT | Mod: 26,,, | Performed by: RADIOLOGY

## 2024-09-24 PROCEDURE — 1160F RVW MEDS BY RX/DR IN RCRD: CPT | Mod: CPTII,S$GLB,, | Performed by: INTERNAL MEDICINE

## 2024-09-24 RX ORDER — MONTELUKAST SODIUM 4 MG/1
1 TABLET, CHEWABLE ORAL
COMMUNITY
Start: 2024-09-20

## 2024-09-24 NOTE — PROGRESS NOTES
"Subjective:      Patient ID: Destini Augustine is a 89 y.o. female.    Chief Complaint:   Pulmonary emphysema    HPI    89-year-old female with emphysema and moderately severe COPD who does well on Stiolto once a day was seen 1 week ago for an acute episode of likely food aspiration which cause a persistent cough.  Those symptoms have since resolved.  She is here today for 1 year interval CT related to right upper lobe pulmonary nodule.  To me that nodule is not visible on today's study.  Otherwise no detrimental interval change.  No specific pulmonary complaints today    Review of Systems as per history of present illness otherwise negative  Objective:     Physical Exam   Constitutional: She is oriented to person, place, and time. She appears well-developed. No distress.   HENT:   Head: Normocephalic.   Cardiovascular: Normal rate and regular rhythm.   Pulmonary/Chest: Normal expansion, symmetric chest wall expansion, effort normal and breath sounds normal.   Musculoskeletal:      Cervical back: Neck supple.   Neurological: She is alert and oriented to person, place, and time.   Psychiatric: She has a normal mood and affect.   Nursing note and vitals reviewed.          9/24/2024     2:46 PM 9/17/2024     3:06 PM 8/22/2024     2:24 PM 8/15/2024    11:44 AM 7/12/2024    10:00 AM 4/22/2024     2:30 PM 3/5/2024     1:16 PM   Pulmonary Function Tests   SpO2 96 % 97 % 95 % 96 % 97 % 93 % 95 %   Height 5' 7" (1.702 m) 5' 7" (1.702 m) 5' 7" (1.702 m) 5' 7" (1.702 m) 5' 7" (1.702 m) 5' 7" (1.702 m) 5' 7" (1.702 m)   Weight 56.7 kg (125 lb) 57.6 kg (126 lb 15.8 oz) 58.9 kg (129 lb 13.6 oz) 58.2 kg (128 lb 4.9 oz) 58.6 kg (129 lb 3 oz) 57.6 kg (127 lb) 59.7 kg (131 lb 9.8 oz)   BMI (Calculated) 19.6 19.9 20.3 20.1 20.2 19.9 20.6        Assessment:     1. Centrilobular emphysema      I personally reviewed CT chest images obtained today and compared these with prior studies.  My interpretation is: Chronic changes such as emphysema " and fibrosis are stable.  Right upper lobe nodule previously demonstrated is not visible on today's study.  Otherwise no detrimental interval change    Plan:     Stable COPD symptoms  Resolved acute aspiration symptoms from 1 week ago  Continue Stiolto once daily  Return in 1 year, sooner if needed

## 2024-10-12 DIAGNOSIS — J43.2 CENTRILOBULAR EMPHYSEMA: ICD-10-CM

## 2024-10-14 RX ORDER — TIOTROPIUM BROMIDE AND OLODATEROL 3.124; 2.736 UG/1; UG/1
SPRAY, METERED RESPIRATORY (INHALATION)
Qty: 4 G | Refills: 0 | Status: SHIPPED | OUTPATIENT
Start: 2024-10-14

## 2024-11-18 DIAGNOSIS — I50.32 CHRONIC DIASTOLIC HEART FAILURE: ICD-10-CM

## 2024-11-18 RX ORDER — FUROSEMIDE 20 MG/1
20 TABLET ORAL
Qty: 90 TABLET | Refills: 3 | Status: SHIPPED | OUTPATIENT
Start: 2024-11-18

## 2024-12-11 ENCOUNTER — OFFICE VISIT (OUTPATIENT)
Dept: CARDIOLOGY | Facility: CLINIC | Age: 89
End: 2024-12-11
Payer: MEDICARE

## 2024-12-11 VITALS
HEART RATE: 78 BPM | SYSTOLIC BLOOD PRESSURE: 138 MMHG | OXYGEN SATURATION: 97 % | BODY MASS INDEX: 18.8 KG/M2 | WEIGHT: 120.06 LBS | DIASTOLIC BLOOD PRESSURE: 78 MMHG

## 2024-12-11 DIAGNOSIS — I36.1 NONRHEUMATIC TRICUSPID VALVE REGURGITATION: ICD-10-CM

## 2024-12-11 DIAGNOSIS — I70.0 ATHEROSCLEROSIS OF AORTA: ICD-10-CM

## 2024-12-11 DIAGNOSIS — I34.0 NONRHEUMATIC MITRAL VALVE REGURGITATION: ICD-10-CM

## 2024-12-11 DIAGNOSIS — R06.09 DYSPNEA ON EXERTION: ICD-10-CM

## 2024-12-11 DIAGNOSIS — I48.21 ATRIAL FIBRILLATION, PERMANENT: Chronic | ICD-10-CM

## 2024-12-11 DIAGNOSIS — J43.2 CENTRILOBULAR EMPHYSEMA: ICD-10-CM

## 2024-12-11 DIAGNOSIS — I27.20 PULMONARY HYPERTENSION: ICD-10-CM

## 2024-12-11 DIAGNOSIS — I50.32 CHRONIC DIASTOLIC HEART FAILURE: Primary | ICD-10-CM

## 2024-12-11 DIAGNOSIS — I50.810 RVF (RIGHT VENTRICULAR FAILURE): ICD-10-CM

## 2024-12-11 DIAGNOSIS — R94.31 ABNORMAL ECG: ICD-10-CM

## 2024-12-11 DIAGNOSIS — R79.89 ELEVATED BRAIN NATRIURETIC PEPTIDE (BNP) LEVEL: ICD-10-CM

## 2024-12-11 DIAGNOSIS — K21.9 GASTROESOPHAGEAL REFLUX DISEASE, UNSPECIFIED WHETHER ESOPHAGITIS PRESENT: ICD-10-CM

## 2024-12-11 PROCEDURE — 99214 OFFICE O/P EST MOD 30 MIN: CPT | Mod: HCNC,S$GLB,, | Performed by: INTERNAL MEDICINE

## 2024-12-11 PROCEDURE — 99999 PR PBB SHADOW E&M-EST. PATIENT-LVL II: CPT | Mod: PBBFAC,HCNC,, | Performed by: INTERNAL MEDICINE

## 2024-12-11 PROCEDURE — 3288F FALL RISK ASSESSMENT DOCD: CPT | Mod: HCNC,CPTII,S$GLB, | Performed by: INTERNAL MEDICINE

## 2024-12-11 PROCEDURE — 1126F AMNT PAIN NOTED NONE PRSNT: CPT | Mod: HCNC,CPTII,S$GLB, | Performed by: INTERNAL MEDICINE

## 2024-12-11 PROCEDURE — 1159F MED LIST DOCD IN RCRD: CPT | Mod: HCNC,CPTII,S$GLB, | Performed by: INTERNAL MEDICINE

## 2024-12-11 PROCEDURE — 1160F RVW MEDS BY RX/DR IN RCRD: CPT | Mod: HCNC,CPTII,S$GLB, | Performed by: INTERNAL MEDICINE

## 2024-12-11 PROCEDURE — 1101F PT FALLS ASSESS-DOCD LE1/YR: CPT | Mod: HCNC,CPTII,S$GLB, | Performed by: INTERNAL MEDICINE

## 2024-12-11 RX ORDER — FUROSEMIDE 20 MG/1
20 TABLET ORAL DAILY
Start: 2024-12-11

## 2024-12-11 NOTE — PROGRESS NOTES
Subjective:    Patient ID:  Destini Augustine is a 89 y.o. female who presents for evaluation of Atrial Fibrillation and Congestive Heart Failure        HPI: Pt presents for eval.  Her current med conditions include permanent a fib, PHTN, AI, DD, RV failure, COPD, TR, MR, aortic valve sclerosis.  Nonsmoker.  Past hx pertinent for following:  F/u by Dr Gibson, Cardiology, who retired.  Echo 2016 normal EF, aortic valve sclerosis, mild TR, mild-mod TR, PAP 46 mmHg.  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  ecg May 2022 a fib w controlled VR, left axis, chronic septal infarct.  Pt admitted May 2023 with CP, COPD exacerbation.  Ecg showed no acute ischemia.  -  troponin levels.  BNP was elevated, 302 (no old to compare).  Tx for COPD exacerbation and improved.  Dr. Taylor, Cards, evaluated pt.  Echo May 2023:  normal LVEF, DD, moderate RVE with moderate RV dysfunction, LAE, mod MR, mild AI, mod-severe TR, PAP 87 mmHg.  Echo Feb 2024: normal EF, DD, severe RVE, moderate RV dysfunction, LAE, KATHY, mild AI, mod-severe MR, mod-severe TR, PAP 76 mmHg.  Ecg 7/12/24 personally reviewed: a fib with controlled VR, left axis, chronic septal infarct.  Now here.  No acute issues noted.  No angina.  Infrequent GERD, Tums helps.  Stable JACKSON.  No falls.  No TIA/CVA sxs.  Compliant w meds.  Labs reviewed.  BNP stable range.  On COPD med.      Past Medical History:   Diagnosis Date    Arthritis     knees, hands    Atherosclerosis of aorta 02/11/2016    X-ray lumbar spine 9/21/2006    Atrial fibrillation     Atrial fibrillation, permanent 05/01/2017    Basal cell carcinoma     face, right thigh    Chronic diastolic heart failure 05/29/2023    COAG (chronic open-angle glaucoma) - Both Eyes 08/28/2013    COPD exacerbation 05/27/2023    Ex-smoker     [place]     mandible    Macular degeneration     Nonrheumatic mitral valve regurgitation 05/24/2022    Nonrheumatic tricuspid valve regurgitation 05/24/2022     Osteopenia     7/14 shama 7/16    Pulmonary heart disease     Pulmonary hypertension 05/29/2023    RVF (right ventricular failure) 03/05/2024    Scapholunate ligament injury with no instability     Scoliosis 11/30/2015    lumbar x-ray     Current Outpatient Medications   Medication Instructions    colestipoL (COLESTID) 1 g, Oral    ELIQUIS 2.5 mg, Oral, 2 times daily    furosemide (LASIX) 20 mg, Oral, Daily    latanoprost 0.005 % ophthalmic solution 1 drop, Both Eyes, Nightly    metoprolol succinate (TOPROL-XL) 25 mg, Oral    multivitamin (THERAGRAN) per tablet 1 tablet, Oral, Daily    STIOLTO RESPIMAT 2.5-2.5 mcg/actuation Mist INHALE 1 PUFF INTO LUNGS ONCE DAILY    timolol maleate 0.5% (TIMOPTIC) 0.5 % Drop 1 drop, Right Eye, 2 times daily    turmeric root extract 500 mg, Oral, Daily         Review of Systems   Constitutional: Negative.   HENT: Negative.     Eyes: Negative.    Cardiovascular:  Positive for dyspnea on exertion.   Respiratory:  Positive for shortness of breath.    Endocrine: Negative.    Hematologic/Lymphatic: Negative.    Skin: Negative.    Musculoskeletal:  Positive for arthritis and stiffness.   Gastrointestinal:  Positive for heartburn.   Genitourinary: Negative.    Neurological:  Positive for weakness.   Psychiatric/Behavioral: Negative.     Allergic/Immunologic: Negative.        /78 (BP Location: Right arm, Patient Position: Sitting)   Pulse 78   Wt 54.5 kg (120 lb 0.7 oz)   SpO2 97%   BMI 18.80 kg/m²     Wt Readings from Last 3 Encounters:   12/11/24 54.5 kg (120 lb 0.7 oz)   09/24/24 56.7 kg (125 lb)   09/17/24 57.6 kg (126 lb 15.8 oz)     Temp Readings from Last 3 Encounters:   08/22/24 99.1 °F (37.3 °C) (Tympanic)   08/15/24 98.1 °F (36.7 °C) (Tympanic)   04/22/24 98.4 °F (36.9 °C)     BP Readings from Last 3 Encounters:   12/11/24 138/78   09/24/24 116/72   09/17/24 102/66     Pulse Readings from Last 3 Encounters:   12/11/24 78   09/24/24 90   09/17/24 86          Objective:     Physical Exam  Vitals and nursing note reviewed.   Constitutional:       General: She is not in acute distress.     Appearance: Normal appearance. She is well-developed. She is not ill-appearing or diaphoretic.   HENT:      Head: Normocephalic.   Neck:      Thyroid: No thyromegaly.      Vascular: No carotid bruit or JVD.   Cardiovascular:      Rate and Rhythm: Normal rate. Rhythm irregularly irregular.      Pulses: Normal pulses.           Radial pulses are 2+ on the right side and 2+ on the left side.      Heart sounds: S1 normal and S2 normal. Murmur heard.      Medium-pitched midsystolic murmur is present with a grade of 2/6 at the upper left sternal border.      No friction rub. No gallop.   Pulmonary:      Effort: Pulmonary effort is normal.      Breath sounds: Normal breath sounds. No wheezing or rales.   Abdominal:      General: Bowel sounds are normal. There is no abdominal bruit.      Palpations: Abdomen is soft.      Tenderness: There is no abdominal tenderness.   Musculoskeletal:      Cervical back: Neck supple.      Right lower leg: No edema.      Left lower leg: No edema.   Lymphadenopathy:      Cervical: No cervical adenopathy.   Skin:     General: Skin is warm.   Neurological:      Mental Status: She is alert and oriented to person, place, and time.   Psychiatric:         Behavior: Behavior normal. Behavior is cooperative.       I have reviewed all pertinent labs and cardiac studies.    Component      Latest Ref Rng 7/12/2024   BNP      0 - 99 pg/mL 244 (H)       Legend:  (H) High    Component      Latest Ref Rng 3/19/2024   BNP      0 - 99 pg/mL 221 (H)       Legend:  (H) High      Component      Latest Ref Rng 2/20/2024   BNP      0 - 99 pg/mL 316 (H)       Legend:  (H) High      Component      Latest Ref Rng 8/15/2023   BNP      0 - 99 pg/mL 250 (H)       Legend:  (H) High    Component      Latest Ref Rng & Units 5/27/2023   BNP      0 - 99 pg/mL 302 (H)       Chemistry        Component Value  "Date/Time     08/15/2024 1317    K 4.1 08/15/2024 1317     08/15/2024 1317    CO2 27 08/15/2024 1317    BUN 25 08/15/2024 1317    CREATININE 0.9 08/15/2024 1317    GLU 93 08/15/2024 1317        Component Value Date/Time    CALCIUM 9.9 08/15/2024 1317    ALKPHOS 64 08/15/2024 1317    AST 24 08/15/2024 1317    ALT 17 08/15/2024 1317    BILITOT 0.7 08/15/2024 1317    ESTGFRAFRICA >60.0 08/12/2021 0956    EGFRNONAA >60.0 08/12/2021 0956        Lab Results   Component Value Date    WBC 5.44 08/15/2024    HGB 14.2 08/15/2024    HCT 46.0 08/15/2024    MCV 94 08/15/2024     08/15/2024       Lab Results   Component Value Date    TSH 2.124 07/12/2024     No results found for: "LABA1C", "HGBA1C"  Lab Results   Component Value Date    CHOL 133 08/15/2023    CHOL 140 08/12/2021    CHOL 149 10/05/2017     Lab Results   Component Value Date    HDL 57 08/15/2023    HDL 66 08/12/2021    HDL 68 10/05/2017     Lab Results   Component Value Date    LDLCALC 69.8 08/15/2023    LDLCALC 61.2 (L) 08/12/2021    LDLCALC 68.4 10/05/2017     Lab Results   Component Value Date    TRIG 31 08/15/2023    TRIG 64 08/12/2021    TRIG 63 10/05/2017     Lab Results   Component Value Date    CHOLHDL 42.9 08/15/2023    CHOLHDL 47.1 08/12/2021    CHOLHDL 45.6 10/05/2017         Results for orders placed during the hospital encounter of 05/16/23    Echo    Interpretation Summary  · The left ventricle is normal in size with concentric remodeling and normal systolic function.  · Severe left atrial enlargement.  · Indeterminate left ventricular diastolic function.  · The estimated PA systolic pressure is 87 mmHg.  · Moderate right ventricular enlargement with moderately reduced right ventricular systolic function.  · There is pulmonary hypertension.  · Elevated central venous pressure (15 mmHg).  · The estimated ejection fraction is 60%.  · There is abnormal septal wall motion. There is systolic flattening of the interventricular septum " consistent with right ventricle pressure overload.  · Severe right atrial enlargement.  · Mild aortic regurgitation.  · Moderate mitral regurgitation.  · Moderate to severe tricuspid regurgitation.  · Mild pulmonic regurgitation.          Results for orders placed during the hospital encounter of 02/20/24    Echo    Interpretation Summary    Left Ventricle: The left ventricle is normal in size. Normal wall thickness. There is concentric remodeling. There is normal systolic function with a visually estimated ejection fraction of 55 - 60%. Unable to assess diastolic function due to atrial fibrillation.    Right Ventricle: Severe right ventricular enlargement. Wall thickness is normal. Right ventricle wall motion has global hypokinesis. Systolic function is moderately reduced.    Left Atrium: Left atrium is severely dilated.    Right Atrium: Right atrium is severely dilated.    Aortic Valve: There is mild aortic regurgitation.    Mitral Valve: Moderately thickened leaflets. Mildly calcified leaflets. There is moderate mitral annular calcification present. There is moderate to severe regurgitation.    Tricuspid Valve: There is moderate to severe regurgitation.    Pulmonic Valve: There is mild to moderate regurgitation.    Pulmonary Artery: There is pulmonary hypertension. The estimated pulmonary artery systolic pressure is 76 mmHg.    IVC/SVC: Elevated venous pressure at 15 mmHg.        Assessment:       1. Chronic diastolic heart failure    2. Abnormal ECG    3. Atrial fibrillation, permanent    4. Atherosclerosis of aorta    5. Pulmonary hypertension    6. Centrilobular emphysema    7. Nonrheumatic tricuspid valve regurgitation    8. Nonrheumatic mitral valve regurgitation    9. Elevated brain natriuretic peptide (BNP) level    10. Dyspnea on exertion    11. RVF (right ventricular failure)    12. Gastroesophageal reflux disease, unspecified whether esophagitis present         Plan:               Stable CV status on  current med tx.  Diastolic CHF: Compensated.  Low salt diet.   Lasix 20 mg daily basis for CHF.  Pt counseled on CHF mgt.  Daily weights.  Low salt diet.  COPD: F/u w Pulmonary as scheduled.  RV failure: likely due  to severe COPD, diastolic CHF, valvular heart disease.  Permanent a fib: rate control strategy. Continue Eliquis bid.  PHTN: due to COPD, diastolic CHF.  F/u w Pulmonary.  F/u echo in future.  Valvular heart conditions: monitor. F/u echo in future.  Reviewed all tests and above medical conditions with patient in detail and formulated treatment plan.  Continue optimal medical treatment for cardiovascular conditions.  Cardiac low salt diet advised.  Daily exercise as tolerated.  Fall risk precautions.  BP control.  Lipid control.  Abnl ecg: chronic abnormalities. monitor.  GERD: OTC meds prn ok.      F/u in 6 months.      I have reviewed all pertinent labs and cardiac studies independently. Plans and recommendations have been formulated under my direct supervision. All questions answered and patient voiced understanding.

## 2024-12-13 ENCOUNTER — PATIENT MESSAGE (OUTPATIENT)
Dept: CARDIOLOGY | Facility: CLINIC | Age: 89
End: 2024-12-13
Payer: MEDICARE

## 2024-12-13 DIAGNOSIS — J43.2 CENTRILOBULAR EMPHYSEMA: ICD-10-CM

## 2024-12-13 RX ORDER — TIOTROPIUM BROMIDE AND OLODATEROL 3.124; 2.736 UG/1; UG/1
SPRAY, METERED RESPIRATORY (INHALATION)
Qty: 4 G | Refills: 0 | Status: SHIPPED | OUTPATIENT
Start: 2024-12-13

## 2025-02-08 DIAGNOSIS — J43.2 CENTRILOBULAR EMPHYSEMA: ICD-10-CM

## 2025-02-10 RX ORDER — TIOTROPIUM BROMIDE AND OLODATEROL 3.124; 2.736 UG/1; UG/1
SPRAY, METERED RESPIRATORY (INHALATION)
Qty: 4 G | Refills: 0 | Status: SHIPPED | OUTPATIENT
Start: 2025-02-10

## 2025-03-13 ENCOUNTER — OFFICE VISIT (OUTPATIENT)
Dept: OPHTHALMOLOGY | Facility: CLINIC | Age: OVER 89
End: 2025-03-13
Payer: MEDICARE

## 2025-03-13 DIAGNOSIS — H40.1132 PRIMARY OPEN ANGLE GLAUCOMA OF BOTH EYES, MODERATE STAGE: Primary | ICD-10-CM

## 2025-03-13 DIAGNOSIS — Z96.1 PSEUDOPHAKIA OF BOTH EYES: ICD-10-CM

## 2025-03-13 DIAGNOSIS — H35.3131 EARLY DRY STAGE NONEXUDATIVE AGE-RELATED MACULAR DEGENERATION OF BOTH EYES: ICD-10-CM

## 2025-03-13 PROCEDURE — 99999 PR PBB SHADOW E&M-EST. PATIENT-LVL II: CPT | Mod: PBBFAC,HCNC,, | Performed by: OPHTHALMOLOGY

## 2025-03-13 PROCEDURE — 92133 CPTRZD OPH DX IMG PST SGM ON: CPT | Mod: HCNC,S$GLB,, | Performed by: OPHTHALMOLOGY

## 2025-03-13 PROCEDURE — 92083 EXTENDED VISUAL FIELD XM: CPT | Mod: HCNC,S$GLB,, | Performed by: OPHTHALMOLOGY

## 2025-03-13 PROCEDURE — 1160F RVW MEDS BY RX/DR IN RCRD: CPT | Mod: HCNC,CPTII,S$GLB, | Performed by: OPHTHALMOLOGY

## 2025-03-13 PROCEDURE — 99214 OFFICE O/P EST MOD 30 MIN: CPT | Mod: HCNC,S$GLB,, | Performed by: OPHTHALMOLOGY

## 2025-03-13 PROCEDURE — 1159F MED LIST DOCD IN RCRD: CPT | Mod: HCNC,CPTII,S$GLB, | Performed by: OPHTHALMOLOGY

## 2025-03-13 NOTE — PROGRESS NOTES
HPI     Glaucoma     Additional comments: 6 months gOCT, dilation, HVF 24-2    Patient states no visual complaints but trouble with itchy eyelids.           Comments    Q 6 months    1. Mod COAG OD>OS (Initial 30/26) goal = 17   2. PCIOL OD w/ ECP & Toric IOL 5/20/09   PCIOL OS w/ ECP & Toric IOL 4/22/09   3. Mild Dry AMD   4. PCO OS   5. Pseudophakia OU      Latanoprost QHS OU   Timolol BID OD     Ocuvite   HAG (does not do)   O3FO  WC/LS           Last edited by Blanka Najera on 3/13/2025  9:48 AM.            Assessment /Plan     For exam results, see Encounter Report.    Primary open angle glaucoma of both eyes, moderate stage  Doing well, intraocular pressure (IOP) within acceptable range relative to target IOP with no evidence of progression. Continue current treatment. Reviewed importance of continued compliance with treatment and follow up.      Continue current gtts:  Latanoprost one drop in each eye nightly and Timolol one drop in each eye every 12 hours    RTC in 6 months with CCT and IOP check.    Pseudophakia of both eyes  Stable, monitor yearly.     Early dry stage nonexudative age-related macular degeneration of both eyes  Condition stable, no therapeutic intervention necessary at this time. Will continue to monitor.

## 2025-04-09 ENCOUNTER — PATIENT MESSAGE (OUTPATIENT)
Dept: INTERNAL MEDICINE | Facility: CLINIC | Age: OVER 89
End: 2025-04-09
Payer: MEDICARE

## 2025-04-09 DIAGNOSIS — Z12.31 ENCOUNTER FOR SCREENING MAMMOGRAM FOR BREAST CANCER: Primary | ICD-10-CM

## 2025-04-11 ENCOUNTER — TELEPHONE (OUTPATIENT)
Dept: OPHTHALMOLOGY | Facility: CLINIC | Age: OVER 89
End: 2025-04-11
Payer: MEDICARE

## 2025-04-11 NOTE — TELEPHONE ENCOUNTER
Returned call to patient, she woke yesterday morning with a bruise on her eyelid, denies any trauma, pain or changes in [place], she is blood thinners, explained that she most likely rubbed her eye in her sleep and being on the blood thinners it caused bruising, told her to call immediately if anything changes or worsens over the weekend.         ----- Message from Leeanne sent at 4/11/2025 10:05 AM CDT -----  Type:  Patient  CallWho Called:Pt Would the patient rather a call back or a response via [facility]? Call back Best Call Back Number:[phone] Information: need to speak with the office about a bruise eyelid

## 2025-04-14 DIAGNOSIS — J43.2 CENTRILOBULAR EMPHYSEMA: ICD-10-CM

## 2025-04-14 RX ORDER — TIOTROPIUM BROMIDE AND OLODATEROL 3.124; 2.736 UG/1; UG/1
SPRAY, METERED RESPIRATORY (INHALATION)
Qty: 4 G | Refills: 0 | Status: SHIPPED | OUTPATIENT
Start: 2025-04-14

## 2025-04-28 ENCOUNTER — HOSPITAL ENCOUNTER (OUTPATIENT)
Dept: RADIOLOGY | Facility: HOSPITAL | Age: OVER 89
Discharge: HOME | End: 2025-04-28
Attending: FAMILY MEDICINE
Payer: MEDICARE

## 2025-04-28 DIAGNOSIS — Z12.31 ENCOUNTER FOR SCREENING MAMMOGRAM FOR BREAST CANCER: ICD-10-CM

## 2025-04-28 PROCEDURE — 77067 SCR MAMMO BI INCL CAD: CPT | Mod: TC,HCNC

## 2025-04-28 PROCEDURE — 77063 BREAST TOMOSYNTHESIS BI: CPT | Mod: 26,HCNC,, | Performed by: STUDENT IN AN ORGANIZED HEALTH CARE EDUCATION/TRAINING PROGRAM

## 2025-04-28 PROCEDURE — 77067 SCR MAMMO BI INCL CAD: CPT | Mod: 26,HCNC,, | Performed by: STUDENT IN AN ORGANIZED HEALTH CARE EDUCATION/TRAINING PROGRAM

## 2025-06-04 ENCOUNTER — LAB VISIT (OUTPATIENT)
Dept: LAB | Facility: HOSPITAL | Age: OVER 89
End: 2025-06-04
Attending: INTERNAL MEDICINE
Payer: MEDICARE

## 2025-06-04 ENCOUNTER — OFFICE VISIT (OUTPATIENT)
Dept: CARDIOLOGY | Facility: CLINIC | Age: OVER 89
End: 2025-06-04
Payer: MEDICARE

## 2025-06-04 VITALS
OXYGEN SATURATION: 95 % | RESPIRATION RATE: 16 BRPM | HEART RATE: 90 BPM | SYSTOLIC BLOOD PRESSURE: 158 MMHG | DIASTOLIC BLOOD PRESSURE: 82 MMHG | BODY MASS INDEX: 19.72 KG/M2 | WEIGHT: 125.88 LBS

## 2025-06-04 DIAGNOSIS — K21.9 GASTROESOPHAGEAL REFLUX DISEASE, UNSPECIFIED WHETHER ESOPHAGITIS PRESENT: ICD-10-CM

## 2025-06-04 DIAGNOSIS — I27.9 PULMONARY HEART DISEASE: ICD-10-CM

## 2025-06-04 DIAGNOSIS — I27.20 PULMONARY HYPERTENSION: ICD-10-CM

## 2025-06-04 DIAGNOSIS — R79.89 ELEVATED BRAIN NATRIURETIC PEPTIDE (BNP) LEVEL: ICD-10-CM

## 2025-06-04 DIAGNOSIS — I50.810 RVF (RIGHT VENTRICULAR FAILURE): ICD-10-CM

## 2025-06-04 DIAGNOSIS — I34.0 NONRHEUMATIC MITRAL VALVE REGURGITATION: ICD-10-CM

## 2025-06-04 DIAGNOSIS — R06.09 DYSPNEA ON EXERTION: ICD-10-CM

## 2025-06-04 DIAGNOSIS — Z79.01 ANTICOAGULATED: ICD-10-CM

## 2025-06-04 DIAGNOSIS — I48.21 ATRIAL FIBRILLATION, PERMANENT: Chronic | ICD-10-CM

## 2025-06-04 DIAGNOSIS — I36.1 NONRHEUMATIC TRICUSPID VALVE REGURGITATION: ICD-10-CM

## 2025-06-04 DIAGNOSIS — I50.32 CHRONIC DIASTOLIC HEART FAILURE: ICD-10-CM

## 2025-06-04 DIAGNOSIS — R94.31 ABNORMAL ECG: ICD-10-CM

## 2025-06-04 DIAGNOSIS — I48.21 ATRIAL FIBRILLATION, PERMANENT: ICD-10-CM

## 2025-06-04 DIAGNOSIS — I50.32 CHRONIC DIASTOLIC HEART FAILURE: Primary | ICD-10-CM

## 2025-06-04 DIAGNOSIS — I70.0 ATHEROSCLEROSIS OF AORTA: ICD-10-CM

## 2025-06-04 LAB
ABSOLUTE EOSINOPHIL (OHS): 0.1 K/UL
ABSOLUTE MONOCYTE (OHS): 0.33 K/UL (ref 0.3–1)
ABSOLUTE NEUTROPHIL COUNT (OHS): 3.34 K/UL (ref 1.8–7.7)
ALBUMIN SERPL BCP-MCNC: 4.1 G/DL (ref 3.5–5.2)
ALP SERPL-CCNC: 68 UNIT/L (ref 40–150)
ALT SERPL W/O P-5'-P-CCNC: 18 UNIT/L (ref 10–44)
ANION GAP (OHS): 9 MMOL/L (ref 8–16)
AST SERPL-CCNC: 24 UNIT/L (ref 11–45)
BASOPHILS # BLD AUTO: 0.03 K/UL
BASOPHILS NFR BLD AUTO: 0.6 %
BILIRUB SERPL-MCNC: 0.7 MG/DL (ref 0.1–1)
BUN SERPL-MCNC: 20 MG/DL (ref 10–30)
CALCIUM SERPL-MCNC: 9.7 MG/DL (ref 8.7–10.5)
CHLORIDE SERPL-SCNC: 103 MMOL/L (ref 95–110)
CO2 SERPL-SCNC: 27 MMOL/L (ref 23–29)
CREAT SERPL-MCNC: 0.8 MG/DL (ref 0.5–1.4)
ERYTHROCYTE [DISTWIDTH] IN BLOOD BY AUTOMATED COUNT: 13.6 % (ref 11.5–14.5)
GFR SERPLBLD CREATININE-BSD FMLA CKD-EPI: >60 ML/MIN/1.73/M2
GLUCOSE SERPL-MCNC: 102 MG/DL (ref 70–110)
HCT VFR BLD AUTO: 44 % (ref 37–48.5)
HGB BLD-MCNC: 14.2 GM/DL (ref 12–16)
IMM GRANULOCYTES # BLD AUTO: 0.01 K/UL (ref 0–0.04)
IMM GRANULOCYTES NFR BLD AUTO: 0.2 % (ref 0–0.5)
LYMPHOCYTES # BLD AUTO: 1.22 K/UL (ref 1–4.8)
MCH RBC QN AUTO: 29.2 PG (ref 27–31)
MCHC RBC AUTO-ENTMCNC: 32.3 G/DL (ref 32–36)
MCV RBC AUTO: 91 FL (ref 82–98)
NUCLEATED RBC (/100WBC) (OHS): 0 /100 WBC
PLATELET # BLD AUTO: 158 K/UL (ref 150–450)
PMV BLD AUTO: 11.7 FL (ref 9.2–12.9)
POTASSIUM SERPL-SCNC: 4.3 MMOL/L (ref 3.5–5.1)
PROT SERPL-MCNC: 7.4 GM/DL (ref 6–8.4)
RBC # BLD AUTO: 4.86 M/UL (ref 4–5.4)
RELATIVE EOSINOPHIL (OHS): 2 %
RELATIVE LYMPHOCYTE (OHS): 24.3 % (ref 18–48)
RELATIVE MONOCYTE (OHS): 6.6 % (ref 4–15)
RELATIVE NEUTROPHIL (OHS): 66.3 % (ref 38–73)
SODIUM SERPL-SCNC: 139 MMOL/L (ref 136–145)
WBC # BLD AUTO: 5.03 K/UL (ref 3.9–12.7)

## 2025-06-04 PROCEDURE — 99214 OFFICE O/P EST MOD 30 MIN: CPT | Mod: HCNC,S$GLB,, | Performed by: INTERNAL MEDICINE

## 2025-06-04 PROCEDURE — 1160F RVW MEDS BY RX/DR IN RCRD: CPT | Mod: CPTII,HCNC,S$GLB, | Performed by: INTERNAL MEDICINE

## 2025-06-04 PROCEDURE — 99999 PR PBB SHADOW E&M-EST. PATIENT-LVL III: CPT | Mod: PBBFAC,,, | Performed by: INTERNAL MEDICINE

## 2025-06-04 PROCEDURE — 36415 COLL VENOUS BLD VENIPUNCTURE: CPT | Mod: HCNC

## 2025-06-04 PROCEDURE — 80053 COMPREHEN METABOLIC PANEL: CPT | Mod: HCNC

## 2025-06-04 PROCEDURE — 1159F MED LIST DOCD IN RCRD: CPT | Mod: CPTII,HCNC,S$GLB, | Performed by: INTERNAL MEDICINE

## 2025-06-04 PROCEDURE — 85025 COMPLETE CBC W/AUTO DIFF WBC: CPT | Mod: HCNC

## 2025-06-13 ENCOUNTER — OFFICE VISIT (OUTPATIENT)
Dept: INTERNAL MEDICINE | Facility: CLINIC | Age: OVER 89
End: 2025-06-13
Payer: MEDICARE

## 2025-06-13 ENCOUNTER — HOSPITAL ENCOUNTER (OUTPATIENT)
Dept: RADIOLOGY | Facility: HOSPITAL | Age: OVER 89
Discharge: HOME | End: 2025-06-13
Attending: PHYSICIAN ASSISTANT
Payer: MEDICARE

## 2025-06-13 VITALS
BODY MASS INDEX: 19.51 KG/M2 | HEIGHT: 67 IN | TEMPERATURE: 96 F | OXYGEN SATURATION: 95 % | HEART RATE: 77 BPM | DIASTOLIC BLOOD PRESSURE: 82 MMHG | WEIGHT: 124.31 LBS | SYSTOLIC BLOOD PRESSURE: 132 MMHG

## 2025-06-13 DIAGNOSIS — K52.9 CHRONIC DIARRHEA: ICD-10-CM

## 2025-06-13 DIAGNOSIS — M25.561 CHRONIC PAIN OF BOTH KNEES: ICD-10-CM

## 2025-06-13 DIAGNOSIS — H40.1132 PRIMARY OPEN ANGLE GLAUCOMA OF BOTH EYES, MODERATE STAGE: ICD-10-CM

## 2025-06-13 DIAGNOSIS — I27.9 PULMONARY HEART DISEASE: ICD-10-CM

## 2025-06-13 DIAGNOSIS — J43.2 CENTRILOBULAR EMPHYSEMA: ICD-10-CM

## 2025-06-13 DIAGNOSIS — I70.0 ATHEROSCLEROSIS OF AORTA: ICD-10-CM

## 2025-06-13 DIAGNOSIS — M25.552 LEFT HIP PAIN: ICD-10-CM

## 2025-06-13 DIAGNOSIS — D68.69 OTHER THROMBOPHILIA: ICD-10-CM

## 2025-06-13 DIAGNOSIS — G89.29 CHRONIC PAIN OF BOTH KNEES: ICD-10-CM

## 2025-06-13 DIAGNOSIS — J44.1 COPD EXACERBATION: ICD-10-CM

## 2025-06-13 DIAGNOSIS — M25.562 CHRONIC PAIN OF BOTH KNEES: ICD-10-CM

## 2025-06-13 DIAGNOSIS — I48.21 ATRIAL FIBRILLATION, PERMANENT: Chronic | ICD-10-CM

## 2025-06-13 DIAGNOSIS — E04.2 MULTIPLE THYROID NODULES: ICD-10-CM

## 2025-06-13 DIAGNOSIS — Z79.01 ANTICOAGULATED: ICD-10-CM

## 2025-06-13 DIAGNOSIS — M25.552 LEFT HIP PAIN: Primary | ICD-10-CM

## 2025-06-13 DIAGNOSIS — M85.89 OSTEOPENIA OF MULTIPLE SITES: ICD-10-CM

## 2025-06-13 PROCEDURE — 73502 X-RAY EXAM HIP UNI 2-3 VIEWS: CPT | Mod: TC,LT

## 2025-06-13 PROCEDURE — 73502 X-RAY EXAM HIP UNI 2-3 VIEWS: CPT | Mod: 26,LT,, | Performed by: RADIOLOGY

## 2025-06-13 PROCEDURE — 73562 X-RAY EXAM OF KNEE 3: CPT | Mod: TC,50

## 2025-06-13 PROCEDURE — 99999 PR PBB SHADOW E&M-EST. PATIENT-LVL V: CPT | Mod: PBBFAC,HCNC,, | Performed by: PHYSICIAN ASSISTANT

## 2025-06-13 PROCEDURE — 73562 X-RAY EXAM OF KNEE 3: CPT | Mod: 26,50,, | Performed by: RADIOLOGY

## 2025-06-13 NOTE — PROGRESS NOTES
Subjective:      Patient ID: Destini Augustine is a 89 y.o. female.    Chief Complaint: Hip Pain    HPI  History of Present Illness    CHIEF COMPLAINT:  Ms. Augustine presents today for hip pain.    MUSCULOSKELETAL:  She reports hip pain that started one month ago, initially mild and occasional, but has recently worsened to the point of difficulty walking, particularly notable upon waking up on Monday morning. The pain is position-dependent. She denies any falls or injuries related to the hip pain. She has been using arthritis cream and Tylenol for pain management, with Tylenol providing significant relief. She has been limiting her activity level to alleviate the pain. She has a history of right knee meniscus tears - the first occurring in 1990 with surgical intervention recommended but declined, and a subsequent tear after slipping on a wet sidewalk. Neither tear has been surgically repaired.    HYPERTENSION:  She had an elevated blood pressure reading of 158 at Dr. Rhodes' office last week, which improved to 138 at follow-up. She maintains sodium intake below 1500 mg daily and avoids cooking with salt. She continues Metoprolol and daily Lasix with good tolerance.    GASTROINTESTINAL:  Recent GI workup including multiple tests at a [facility] showed no significant findings. She reports variable bowel movements requiring medication adjustments, sometimes needing two doses and other times reducing to one dose depending on symptoms.    Medications were reviewed      ROS:  General: -fever, -chills, -fatigue, -weight gain, -weight loss  Eyes: -vision changes, -redness, -discharge  ENT: -ear pain, -nasal congestion, -sore throat  Cardiovascular: -chest pain, -palpitations, -lower extremity edema  Respiratory: -cough, -shortness of breath  Gastrointestinal: -abdominal pain, -nausea, -vomiting, -diarrhea, -constipation, -blood in stool, +change in bowel habits  Genitourinary: -dysuria, -hematuria,  "-frequency  Musculoskeletal: +joint pain, -muscle pain, +pain with movement, +limb pain, +difficulty standing up, +difficulty walking  Skin: -rash, -lesion  Neurological: -headache, -dizziness, -numbness, -tingling  Psychiatric: -anxiety, -depression, -sleep difficulty       Left hip pain.   See by gastro associates for her persistent diarrhea. No findings on any of the tests that were run.     Problem List[1]    Current Medications[2]    Review of Systems   Constitutional:  Negative for activity change, appetite change, chills, diaphoresis, fatigue, fever and unexpected weight change.   HENT: Negative.  Negative for congestion, hearing loss, postnasal drip, rhinorrhea, sore throat, trouble swallowing and voice change.    Eyes: Negative.  Negative for visual disturbance.   Respiratory: Negative.  Negative for cough, choking, chest tightness and shortness of breath.    Cardiovascular:  Negative for chest pain, palpitations and leg swelling.   Gastrointestinal:  Positive for diarrhea. Negative for abdominal distention, abdominal pain, blood in stool, constipation, nausea and vomiting.   Endocrine: Negative for cold intolerance, heat intolerance, polydipsia and polyuria.   Genitourinary: Negative.  Negative for difficulty urinating and frequency.   Musculoskeletal:  Positive for arthralgias and gait problem. Negative for back pain, joint swelling, myalgias, neck pain and neck stiffness.   Skin:  Negative for color change, pallor, rash and wound.   Neurological:  Negative for dizziness, tremors, weakness, light-headedness, numbness and headaches.   Hematological:  Negative for adenopathy.   Psychiatric/Behavioral:  Negative for behavioral problems, confusion, self-injury, sleep disturbance and suicidal ideas. The patient is not nervous/anxious.      Objective:   /82 (BP Location: Right arm, Patient Position: Sitting)   Pulse 77   Temp 96.4 °F (35.8 °C) (Tympanic)   Ht 5' 7" (1.702 m)   Wt 56.4 kg (124 lb 5.4 " oz)   SpO2 95%   BMI 19.47 kg/m²     Physical Exam  Vitals reviewed.   Constitutional:       General: She is not in acute distress.     Appearance: Normal appearance. She is well-developed. She is not ill-appearing, toxic-appearing or diaphoretic.   HENT:      Head: Normocephalic and atraumatic.      Right Ear: External ear normal.      Left Ear: External ear normal.      Nose: Nose normal.   Eyes:      Conjunctiva/sclera: Conjunctivae normal.      Pupils: Pupils are equal, round, and reactive to light.   Cardiovascular:      Rate and Rhythm: Normal rate and regular rhythm.      Heart sounds: Normal heart sounds. No murmur heard.     No friction rub. No gallop.   Pulmonary:      Effort: Pulmonary effort is normal. No respiratory distress.      Breath sounds: Normal breath sounds. No wheezing or rales.   Chest:      Chest wall: No tenderness.   Abdominal:      General: There is no distension.      Palpations: Abdomen is soft.      Tenderness: There is no abdominal tenderness.   Musculoskeletal:         General: Normal range of motion.      Cervical back: Normal range of motion and neck supple.      Left hip: Tenderness and bony tenderness present. No deformity, lacerations or crepitus. Normal range of motion. Decreased strength.      Left upper leg: Normal. No swelling or edema.      Right knee: Normal. No swelling, effusion or erythema. Normal range of motion. No tenderness.      Left knee: Normal. No swelling, effusion or erythema. Normal range of motion. No tenderness.   Lymphadenopathy:      Cervical: No cervical adenopathy.   Skin:     General: Skin is warm and dry.      Capillary Refill: Capillary refill takes less than 2 seconds.      Findings: No rash.   Neurological:      Mental Status: She is alert and oriented to person, place, and time.      Motor: No weakness.      Coordination: Coordination normal.      Gait: Gait normal.   Psychiatric:         Mood and Affect: Mood normal.         Behavior: Behavior  normal.         Thought Content: Thought content normal.         Judgment: Judgment normal.       Lab Visit on 06/04/2025   Component Date Value Ref Range Status    Sodium 06/04/2025 139  136 - 145 mmol/L Final    Potassium 06/04/2025 4.3  3.5 - 5.1 mmol/L Final    Chloride 06/04/2025 103  95 - 110 mmol/L Final    CO2 06/04/2025 27  23 - 29 mmol/L Final    Glucose 06/04/2025 102  70 - 110 mg/dL Final    BUN 06/04/2025 20  10 - 30 mg/dL Final    Creatinine 06/04/2025 0.8  0.5 - 1.4 mg/dL Final    Calcium 06/04/2025 9.7  8.7 - 10.5 mg/dL Final    Protein Total 06/04/2025 7.4  6.0 - 8.4 gm/dL Final    Albumin 06/04/2025 4.1  3.5 - 5.2 g/dL Final    Bilirubin Total 06/04/2025 0.7  0.1 - 1.0 mg/dL Final    For infants and newborns, interpretation of results should be based   on gestational age, weight and in agreement with clinical   observations.    Premature Infant recommended reference ranges:   0-24 hours:  <8.0 mg/dL   24-48 hours: <12.0 mg/dL   3-5 days:    <15.0 mg/dL   6-29 days:   <15.0 mg/dL    ALP 06/04/2025 68  40 - 150 unit/L Final    AST 06/04/2025 24  11 - 45 unit/L Final    ALT 06/04/2025 18  10 - 44 unit/L Final    Anion Gap 06/04/2025 9  8 - 16 mmol/L Final    eGFR 06/04/2025 >60  >60 mL/min/1.73/m2 Final    Estimated GFR calculated using the CKD-EPI creatinine (2021) equation.    WBC 06/04/2025 5.03  3.90 - 12.70 K/uL Final    RBC 06/04/2025 4.86  4.00 - 5.40 M/uL Final    HGB 06/04/2025 14.2  12.0 - 16.0 gm/dL Final    HCT 06/04/2025 44.0  37.0 - 48.5 % Final    MCV 06/04/2025 91  82 - 98 fL Final    MCH 06/04/2025 29.2  27.0 - 31.0 pg Final    MCHC 06/04/2025 32.3  32.0 - 36.0 g/dL Final    RDW 06/04/2025 13.6  11.5 - 14.5 % Final    Platelet Count 06/04/2025 158  150 - 450 K/uL Final    MPV 06/04/2025 11.7  9.2 - 12.9 fL Final    Nucleated RBC 06/04/2025 0  <=0 /100 WBC Final    Neut % 06/04/2025 66.3  38 - 73 % Final    Lymph % 06/04/2025 24.3  18 - 48 % Final    Mono % 06/04/2025 6.6  4 - 15 %  Final    Eos % 06/04/2025 2.0  <=8 % Final    Basophil % 06/04/2025 0.6  <=1.9 % Final    Imm Grans % 06/04/2025 0.2  0.0 - 0.5 % Final    Neut # 06/04/2025 3.34  1.8 - 7.7 K/uL Final    Lymph # 06/04/2025 1.22  1 - 4.8 K/uL Final    Mono # 06/04/2025 0.33  0.3 - 1 K/uL Final    Eos # 06/04/2025 0.10  <=0.5 K/uL Final    Baso # 06/04/2025 0.03  <=0.2 K/uL Final    Imm Grans # 06/04/2025 0.01  0.00 - 0.04 K/uL Final    Mild elevation in immature granulocytes is non specific and can be seen in a variety of conditions including stress response, acute inflammation, trauma and pregnancy. Correlation with other laboratory and clinical findings is essential.       Assessment:     1. Left hip pain    2. Chronic pain of both knees    3. Chronic diarrhea    4. Anticoagulated    5. Atrial fibrillation, permanent    6. Atherosclerosis of aorta    7. COPD exacerbation    8. Multiple thyroid nodules    9. Primary open angle glaucoma of both eyes, moderate stage    10. Osteopenia of multiple sites    11. Other thrombophilia    12. Pulmonary heart disease    13. Centrilobular emphysema      Plan:   Left hip pain  -     X-Ray Pelvis 3 View inc Hip 2 view Left; Future; Expected date: 06/13/2025  -     Ambulatory Referral/Consult to Physical Therapy  -refer to [facility] near her home as requested.     Chronic pain of both knees  -     X-ray Knee Ortho Bilateral; Future; Expected date: 06/13/2025  -     Ambulatory Referral/Consult to Physical Therapy  -refer to [facility] near her home as requested.     Chronic diarrhea  -cont colestid as prescribed by your gastroenterologist.     Anticoagulated  -stable on eliquis    Atrial fibrillation, permanent  -stable on eliquis and metoprolol    Atherosclerosis of aorta  -     Comprehensive Metabolic Panel; Future; Expected date: 12/13/2025  -     Lipid Panel; Future; Expected date: 12/13/2025      Multiple thyroid nodules  -     TSH; Future; Expected date:  12/13/2025  -stable on recent ultrasound. Cont to monitor    Primary open angle glaucoma of both eyes, moderate stage    Osteopenia of multiple sites    Other thrombophilia  -     CBC Auto Differential; Future; Expected date: 12/13/2025    Pulmonary heart disease  Centrilobular emphysema  -up to date with pulm      Follow up in about 6 months (around 12/13/2025), or if symptoms worsen or fail to improve.           [1]   Patient Active Problem List  Diagnosis    Osteopenia    Nonexudative senile macular degeneration of retina    Atherosclerosis of aorta    Primary open angle glaucoma of both eyes, moderate stage    Pulmonary heart disease    Atrial fibrillation, permanent    Pseudophakia of both eyes    Anticoagulated    Osteoarthritis of right knee    Scapholunate ligament injury with no instability    Arthritis of left wrist    Abnormal ECG    Nonrheumatic tricuspid valve regurgitation    Nonrheumatic mitral valve regurgitation    Pulmonary emphysema    Multiple thyroid nodules    Other thrombophilia    Elevated brain natriuretic peptide (BNP) level    COPD exacerbation    Dyspnea    Pulmonary hypertension    Chronic diastolic heart failure    RVF (right ventricular failure)    GERD (gastroesophageal reflux disease)   [2]   Current Outpatient Medications:     colestipoL (COLESTID) 1 gram Tab, Take 1 g by mouth., Disp: , Rfl:     ELIQUIS 2.5 mg Tab, TAKE 1 TABLET TWICE DAILY, Disp: 180 tablet, Rfl: 3    furosemide (LASIX) 20 MG tablet, Take 1 tablet (20 mg total) by mouth once daily., Disp: , Rfl:     latanoprost 0.005 % ophthalmic solution, Place 1 drop into both eyes every evening., Disp: 9 mL, Rfl: 4    metoprolol succinate (TOPROL-XL) 25 MG 24 hr tablet, TAKE 1 TABLET EVERY DAY, Disp: 90 tablet, Rfl: 3    multivitamin (THERAGRAN) per tablet, Take 1 tablet by mouth once daily., Disp: , Rfl:     STIOLTO RESPIMAT 2.5-2.5 mcg/actuation Mist, INHALE 1 PUFF INTO LUNGS ONCE DAILY, Disp: 4 g, Rfl: 0    timolol maleate  0.5% (TIMOPTIC) 0.5 % Drop, Place 1 drop into the right eye 2 (two) times daily., Disp: 15 mL, Rfl: 4    turmeric root extract 500 mg Cap, Take 500 mg by mouth once daily., Disp: , Rfl:

## 2025-06-16 ENCOUNTER — RESULTS FOLLOW-UP (OUTPATIENT)
Dept: INTERNAL MEDICINE | Facility: CLINIC | Age: OVER 89
End: 2025-06-16

## 2025-06-16 DIAGNOSIS — M17.0 PRIMARY OSTEOARTHRITIS OF BOTH KNEES: ICD-10-CM

## 2025-06-16 DIAGNOSIS — M25.552 LEFT HIP PAIN: Primary | ICD-10-CM

## 2025-06-20 DIAGNOSIS — J43.2 CENTRILOBULAR EMPHYSEMA: ICD-10-CM

## 2025-06-21 RX ORDER — TIOTROPIUM BROMIDE AND OLODATEROL 3.124; 2.736 UG/1; UG/1
SPRAY, METERED RESPIRATORY (INHALATION)
Qty: 4 G | Refills: 0 | Status: SHIPPED | OUTPATIENT
Start: 2025-06-21

## 2025-06-25 ENCOUNTER — OFFICE VISIT (OUTPATIENT)
Dept: ORTHOPEDICS | Facility: CLINIC | Age: OVER 89
End: 2025-06-25
Payer: MEDICARE

## 2025-06-25 VITALS
HEART RATE: 89 BPM | HEIGHT: 67 IN | BODY MASS INDEX: 19.46 KG/M2 | WEIGHT: 124 LBS | SYSTOLIC BLOOD PRESSURE: 134 MMHG | DIASTOLIC BLOOD PRESSURE: 82 MMHG

## 2025-06-25 DIAGNOSIS — M17.0 PRIMARY OSTEOARTHRITIS OF BOTH KNEES: ICD-10-CM

## 2025-06-25 DIAGNOSIS — M25.552 LEFT HIP PAIN: ICD-10-CM

## 2025-06-25 PROCEDURE — 99204 OFFICE O/P NEW MOD 45 MIN: CPT | Mod: S$GLB,,, | Performed by: ORTHOPAEDIC SURGERY

## 2025-06-25 PROCEDURE — 1160F RVW MEDS BY RX/DR IN RCRD: CPT | Mod: CPTII,S$GLB,, | Performed by: ORTHOPAEDIC SURGERY

## 2025-06-25 PROCEDURE — 1159F MED LIST DOCD IN RCRD: CPT | Mod: CPTII,S$GLB,, | Performed by: ORTHOPAEDIC SURGERY

## 2025-06-25 PROCEDURE — 99999 PR PBB SHADOW E&M-EST. PATIENT-LVL III: CPT | Mod: PBBFAC,,, | Performed by: ORTHOPAEDIC SURGERY

## 2025-06-25 PROCEDURE — 1101F PT FALLS ASSESS-DOCD LE1/YR: CPT | Mod: CPTII,S$GLB,, | Performed by: ORTHOPAEDIC SURGERY

## 2025-06-25 PROCEDURE — 1125F AMNT PAIN NOTED PAIN PRSNT: CPT | Mod: CPTII,S$GLB,, | Performed by: ORTHOPAEDIC SURGERY

## 2025-06-25 PROCEDURE — 3288F FALL RISK ASSESSMENT DOCD: CPT | Mod: CPTII,S$GLB,, | Performed by: ORTHOPAEDIC SURGERY

## 2025-06-25 NOTE — PROGRESS NOTES
Patient ID: Destini Augustine  YOB: 1933  MRN: [id number]    Chief Complaint: Pain of the Right Knee, Pain of the Left Knee, and Pain of the Right Hip      Referred By: Krista Leon*     History of Present Illness: Destini Augustine is a  89 y.o. female   Data Unavailable with a chief complaint of Pain of the Right Knee, Pain of the Left Knee, and Pain of the Right Hip    89-year-old female presenting today with complaints of bilateral hip and knee pain.  Her worse pain is in the right lower extremity.  She really feels like she does not have much pain except for on occasion, she just feels like her legs are weak and she is having difficult time getting out of the chair and performing some of her activities of daily living.  She denies any recent fall injury or trauma    HPI    Past Medical History:   Past Medical History:   Diagnosis Date    Arthritis     knees, hands    Atherosclerosis of aorta 02/11/2016    X-ray lumbar spine 9/21/2006    Atrial fibrillation     Atrial fibrillation, permanent 05/01/2017    Basal cell carcinoma     face, right thigh    Chronic diastolic heart failure 05/29/2023    COAG (chronic open-angle glaucoma) - Both Eyes 08/28/2013    COPD exacerbation 05/27/2023    Ex-smoker     [place]     mandible    Macular degeneration     Nonrheumatic mitral valve regurgitation 05/24/2022    Nonrheumatic tricuspid valve regurgitation 05/24/2022    Osteopenia     7/14 shama 7/16    Pulmonary heart disease     Pulmonary hypertension 05/29/2023    RVF (right ventricular failure) 03/05/2024    Scapholunate ligament injury with no instability     Scoliosis 11/30/2015    lumbar x-ray     Past Surgical History:   Procedure Laterality Date    APPENDECTOMY  1970s    CATARACT EXTRACTION Bilateral     DR. Singleton    hysterectomy  1970s    complete    HYSTERECTOMY      about 39yrs old, partial    MOUTH SURGERY Bilateral 02/10/2020    rectocele and cystocele  1998 approx    TONSILLECTOMY,  ADENOIDECTOMY  5 y/o     Family History   Problem Relation Name Age of Onset    Cataracts Mother      Hypertension Mother      Stroke Mother      Cancer Father          colon    Thyroid disease Father      Cancer Brother          lung    Tuberculosis Brother      Blindness Neg Hx      Diabetes Neg Hx      Macular degeneration Neg Hx      Retinal detachment Neg Hx      Strabismus Neg Hx       Social History[1]  Medication List with Changes/Refills   Current Medications    COLESTIPOL (COLESTID) 1 GRAM TAB    Take 1 g by mouth.    ELIQUIS 2.5 MG TAB    TAKE 1 TABLET TWICE DAILY    FUROSEMIDE (LASIX) 20 MG TABLET    Take 1 tablet (20 mg total) by mouth once daily.    LATANOPROST 0.005 % OPHTHALMIC SOLUTION    Place 1 drop into both eyes every evening.    METOPROLOL SUCCINATE (TOPROL-XL) 25 MG 24 HR TABLET    TAKE 1 TABLET EVERY DAY    MULTIVITAMIN (THERAGRAN) PER TABLET    Take 1 tablet by mouth once daily.    STIOLTO RESPIMAT 2.5-2.5 MCG/ACTUATION MIST    INHALE 1 PUFF INTO LUNGS ONCE DAILY    TIMOLOL MALEATE 0.5% (TIMOPTIC) 0.5 % DROP    Place 1 drop into the right eye 2 (two) times daily.    TURMERIC ROOT EXTRACT 500 MG CAP    Take 500 mg by mouth once daily.     Review of patient's allergies indicates:   Allergen Reactions    Shellfish containing products Diarrhea and Nausea And Vomiting    Venom-wasp Itching and Swelling     Review of Systems   Constitutional: Negative for chills and decreased appetite.   HENT:  Negative for ear pain.    Eyes:  Negative for blurred vision.   Cardiovascular:  Negative for chest pain and claudication.   Respiratory:  Negative for hemoptysis.    Endocrine: Negative for cold intolerance.   Hematologic/Lymphatic: Does not bruise/bleed easily.   Skin:  Negative for dry skin.   Musculoskeletal:  Positive for joint pain.   Gastrointestinal:  Negative for abdominal pain.   Genitourinary:  Negative for flank pain.   Neurological:  Negative for brief paralysis.   Psychiatric/Behavioral:   Negative for depression.    Allergic/Immunologic: Negative for hives.       Physical Exam:   Body mass index is 19.42 kg/m².  Vitals:    06/25/25 1133   BP: 134/82   Pulse: 89      GENERAL: Well appearing, appropriate for stated age, no acute distress.  CARDIOVASCULAR: Pulses regular by peripheral palpation.  PULMONARY: Respirations are even and non-labored.  NEURO: Awake, alert, and oriented x 3.  PSYCH: Mood & affect are appropriate.  HEENT: Head is normocephalic and atraumatic.  Ortho/SPM Exam  Skin is intact about her right lower extremity with no signs of erythema or infection  Logroll of her hip does have significant decreased range of motion with internal rotation that is uncomfortable to her it does reproduce some of her knee pain  Straight leg raise is negative  Neurovascular exam of the extremities normal  Right knee has discomfort along the medial joint line and there is audible crepitus when taking the knee through range of motion  Ligament exam is grossly intact  When she walks she has significant valgus to her right knee    Imaging:    X-Ray Hip 2 or 3 views Left with Pelvis when performed  Narrative: EXAM: XR HIP WITH PELVIS WHEN PERFORMED 2 OR 3 VIEWS LEFT    CLINICAL HISTORY: Left hip pain    FINDINGS:  AP view the pelvis shows no fracture or dislocation.  Severe right hip joint osteoarthritis.  No significant arthritic change in the left hip joint.  No fracture dislocation or avascular sclerosis of the left hip.  Impression:  See above    Finalized on: 6/13/2025 11:42 PM By:  Britton Rollins MD  [facility]# [id number]      2025-06-13 23:44:[id number]     [facility]  X-ray Knee Ortho Bilateral  Narrative: EXAM: XR KNEE ORTHO BILAT    CLINICAL HISTORY: Bilateral knee pain    FINDINGS:  Right knee: Severe lateral compartment and patellofemoral compartment joint space narrowing.  Osteophytes in all 3 compartments worst in the lateral and patellofemoral compartment.  No fracture dislocation or joint effusion.   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    Left knee: Osteophytes in all 3 compartments.  No fracture or dislocation or joint effusion.  Joint spaces are fairly well-preserved.  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  Impression:   Tricompartment osteoarthritis bilaterally much worse in the right knee.    Finalized on: 6/13/2025 11:41 PM By:  Britton Rollins MD  [facility]# [id number]      2025-06-13 23:44:03.[address] [place] [address]      Relevant imaging results reviewed and interpreted by me, discussed with the patient and / or family today.  Hip x-rays do show advanced right hip osteoarthritis and advanced bilateral knee osteoarthritis.  There was no other obvious abnormalities    Other Tests:     None    There are no Patient Instructions on file for this visit.  Provider Note/Medical Decision Making:     Assessment:  Right hip and knee osteoarthritis.  We had a long talk today about treatment options for this.  I walked her through the treatment algorithm for arthritis being therapy, NSAIDs, injections, and even replacement surgery.  She has been prescribed therapy recently.  In her age group I would strongly recommend avoiding surgery as much as possible.  Her main complaint today is weakness and not so much pain so we are going to hold off on attempting it injection and see how she does with physical therapy.  I answered all of her questions today at length and she will follow up on an as-needed basis    I discussed worrisome and red flag signs and symptoms with the patient. The patient expressed understanding and agreed to alert me immediately or to go to the emergency room if they experience any of these.   Treatment plan was developed with input from the patient/family, and they expressed understanding and agreement with the plan. All questions were answered today.         Jermain Leung MD  Orthopaedic Surgery       Disclaimer: This note was prepared using a voice recognition system  and is likely to have sound alike errors within the text.            [1]   Social History  Socioeconomic History    Marital status:     Number of children: 2   Tobacco Use    Smoking status: Former     Current packs/day: 0.00     Average packs/day: 1.5 packs/day for 30.0 years (45.0 ttl pk-yrs)     Types: Cigarettes     Start date: 1950     Quit date: 1980     Years since quittin.4    Smokeless tobacco: Former   Substance and Sexual Activity    Alcohol use: Not Currently    Drug use: No    Sexual activity: Never     Partners: Female   Social History Narrative    Lives alone, no pets or smokers in household.     Social Drivers of Health     Financial Resource Strain: Low Risk  (2025)    Overall Financial Resource Strain (CARDIA)     Difficulty of Paying Living Expenses: Not hard at all   Food Insecurity: No Food Insecurity (2025)    Hunger Vital Sign     Worried About Running Out of Food in the Last Year: Never true     Ran Out of Food in the Last Year: Never true   Transportation Needs: No Transportation Needs (2025)    PRAPARE - Transportation     Lack of Transportation (Medical): No     Lack of Transportation (Non-Medical): No   Physical Activity: Sufficiently Active (2025)    Exercise Vital Sign     Days of Exercise per Week: 3 days     Minutes of Exercise per Session: 60 min   Stress: Stress Concern Present (2025)    [employer] - Occupational Stress Questionnaire     Feeling of Stress : To some extent   Housing Stability: Low Risk  (2025)    Housing Stability Vital Sign     Unable to Pay for Housing in the Last Year: No     Number of Times Moved in the Last Year: 0     Homeless in the Last Year: No

## 2025-07-09 ENCOUNTER — LAB VISIT (OUTPATIENT)
Dept: LAB | Facility: HOSPITAL | Age: OVER 89
End: 2025-07-09
Attending: INTERNAL MEDICINE
Payer: MEDICARE

## 2025-07-09 DIAGNOSIS — I48.21 ATRIAL FIBRILLATION, PERMANENT: Chronic | ICD-10-CM

## 2025-07-09 DIAGNOSIS — I50.32 CHRONIC DIASTOLIC HEART FAILURE: ICD-10-CM

## 2025-07-09 LAB — BNP SERPL-MCNC: 370 PG/ML (ref 0–99)

## 2025-07-09 PROCEDURE — 83880 ASSAY OF NATRIURETIC PEPTIDE: CPT | Mod: HCNC

## 2025-07-09 PROCEDURE — 36415 COLL VENOUS BLD VENIPUNCTURE: CPT | Mod: HCNC

## 2025-08-30 DIAGNOSIS — I48.19 ATRIAL FIBRILLATION, PERSISTENT: ICD-10-CM

## 2025-09-02 RX ORDER — METOPROLOL SUCCINATE 25 MG/1
25 TABLET, EXTENDED RELEASE ORAL
Qty: 90 TABLET | Refills: 3 | Status: SHIPPED | OUTPATIENT
Start: 2025-09-02